# Patient Record
Sex: FEMALE | Race: WHITE | Employment: FULL TIME | URBAN - METROPOLITAN AREA
[De-identification: names, ages, dates, MRNs, and addresses within clinical notes are randomized per-mention and may not be internally consistent; named-entity substitution may affect disease eponyms.]

---

## 2018-07-27 ENCOUNTER — HOSPITAL ENCOUNTER (OUTPATIENT)
Dept: MAMMOGRAPHY | Age: 58
Discharge: HOME | End: 2018-07-27
Attending: INTERNAL MEDICINE
Payer: COMMERCIAL

## 2018-07-27 DIAGNOSIS — Z12.39 SCREENING BREAST EXAMINATION: ICD-10-CM

## 2018-07-27 PROCEDURE — 77067 SCR MAMMO BI INCL CAD: CPT

## 2020-02-27 ENCOUNTER — APPOINTMENT (OUTPATIENT)
Dept: GENERAL RADIOLOGY | Age: 60
End: 2020-02-27
Attending: EMERGENCY MEDICINE
Payer: COMMERCIAL

## 2020-02-27 ENCOUNTER — HOSPITAL ENCOUNTER (EMERGENCY)
Age: 60
Discharge: HOME | End: 2020-02-27
Attending: EMERGENCY MEDICINE
Payer: COMMERCIAL

## 2020-02-27 VITALS
HEART RATE: 70 BPM | DIASTOLIC BLOOD PRESSURE: 62 MMHG | BODY MASS INDEX: 35.93 KG/M2 | TEMPERATURE: 97.9 F | HEIGHT: 60 IN | SYSTOLIC BLOOD PRESSURE: 115 MMHG | RESPIRATION RATE: 17 BRPM | WEIGHT: 183 LBS | OXYGEN SATURATION: 98 %

## 2020-02-27 DIAGNOSIS — R07.9 CHEST PAIN, UNSPECIFIED TYPE: Primary | ICD-10-CM

## 2020-02-27 LAB
ALBUMIN SERPL-MCNC: 4.2 G/DL (ref 3.5–5)
ALBUMIN/GLOB SERPL: 1.4 {RATIO} (ref 1.1–2.2)
ALP SERPL-CCNC: 82 U/L (ref 45–117)
ALT SERPL-CCNC: 27 U/L (ref 12–78)
ANION GAP SERPL CALC-SCNC: 6 MMOL/L (ref 5–15)
AST SERPL-CCNC: 15 U/L (ref 15–37)
BASOPHILS # BLD: 0 K/UL (ref 0–0.1)
BASOPHILS # BLD: 0.1 K/UL (ref 0–0.1)
BASOPHILS NFR BLD: 0 % (ref 0–1)
BASOPHILS NFR BLD: 1 % (ref 0–1)
BILIRUB SERPL-MCNC: 0.4 MG/DL (ref 0.2–1)
BUN SERPL-MCNC: 20 MG/DL (ref 6–20)
BUN/CREAT SERPL: 24 (ref 12–20)
CALCIUM SERPL-MCNC: 9.5 MG/DL (ref 8.5–10.1)
CHLORIDE SERPL-SCNC: 104 MMOL/L (ref 97–108)
CO2 SERPL-SCNC: 29 MMOL/L (ref 21–32)
COMMENT, HOLDF: NORMAL
CREAT SERPL-MCNC: 0.84 MG/DL (ref 0.55–1.02)
D DIMER PPP FEU-MCNC: 0.28 MG/L FEU (ref 0–0.65)
DIFFERENTIAL METHOD BLD: ABNORMAL
DIFFERENTIAL METHOD BLD: ABNORMAL
EOSINOPHIL # BLD: 0.3 K/UL (ref 0–0.4)
EOSINOPHIL # BLD: 0.3 K/UL (ref 0–0.4)
EOSINOPHIL NFR BLD: 3 % (ref 0–7)
EOSINOPHIL NFR BLD: 3 % (ref 0–7)
ERYTHROCYTE [DISTWIDTH] IN BLOOD BY AUTOMATED COUNT: 14.1 % (ref 11.5–14.5)
ERYTHROCYTE [DISTWIDTH] IN BLOOD BY AUTOMATED COUNT: 14.1 % (ref 11.5–14.5)
GLOBULIN SER CALC-MCNC: 3 G/DL (ref 2–4)
GLUCOSE SERPL-MCNC: 103 MG/DL (ref 65–100)
HCT VFR BLD AUTO: 42.9 % (ref 35–47)
HCT VFR BLD AUTO: 43 % (ref 35–47)
HGB BLD-MCNC: 14.4 G/DL (ref 11.5–16)
HGB BLD-MCNC: 14.4 G/DL (ref 11.5–16)
IMM GRANULOCYTES # BLD AUTO: 0 K/UL (ref 0–0.04)
IMM GRANULOCYTES # BLD AUTO: 0 K/UL (ref 0–0.04)
IMM GRANULOCYTES NFR BLD AUTO: 0 % (ref 0–0.5)
IMM GRANULOCYTES NFR BLD AUTO: 0 % (ref 0–0.5)
LIPASE SERPL-CCNC: 99 U/L (ref 73–393)
LYMPHOCYTES # BLD: 3.8 K/UL (ref 0.8–3.5)
LYMPHOCYTES # BLD: 3.8 K/UL (ref 0.8–3.5)
LYMPHOCYTES NFR BLD: 37 % (ref 12–49)
LYMPHOCYTES NFR BLD: 38 % (ref 12–49)
MAGNESIUM SERPL-MCNC: 2.1 MG/DL (ref 1.6–2.4)
MCH RBC QN AUTO: 29.7 PG (ref 26–34)
MCH RBC QN AUTO: 29.7 PG (ref 26–34)
MCHC RBC AUTO-ENTMCNC: 33.5 G/DL (ref 30–36.5)
MCHC RBC AUTO-ENTMCNC: 33.6 G/DL (ref 30–36.5)
MCV RBC AUTO: 88.5 FL (ref 80–99)
MCV RBC AUTO: 88.7 FL (ref 80–99)
MONOCYTES # BLD: 0.7 K/UL (ref 0–1)
MONOCYTES # BLD: 0.7 K/UL (ref 0–1)
MONOCYTES NFR BLD: 7 % (ref 5–13)
MONOCYTES NFR BLD: 7 % (ref 5–13)
NEUTS SEG # BLD: 5.3 K/UL (ref 1.8–8)
NEUTS SEG # BLD: 5.3 K/UL (ref 1.8–8)
NEUTS SEG NFR BLD: 51 % (ref 32–75)
NEUTS SEG NFR BLD: 53 % (ref 32–75)
NRBC # BLD: 0 K/UL (ref 0–0.01)
NRBC # BLD: 0 K/UL (ref 0–0.01)
NRBC BLD-RTO: 0 PER 100 WBC
NRBC BLD-RTO: 0 PER 100 WBC
PLATELET # BLD AUTO: 321 K/UL (ref 150–400)
PLATELET # BLD AUTO: 326 K/UL (ref 150–400)
PMV BLD AUTO: 9.7 FL (ref 8.9–12.9)
PMV BLD AUTO: 9.7 FL (ref 8.9–12.9)
POTASSIUM SERPL-SCNC: 3.3 MMOL/L (ref 3.5–5.1)
PROT SERPL-MCNC: 7.2 G/DL (ref 6.4–8.2)
RBC # BLD AUTO: 4.85 M/UL (ref 3.8–5.2)
RBC # BLD AUTO: 4.85 M/UL (ref 3.8–5.2)
SAMPLES BEING HELD,HOLD: NORMAL
SODIUM SERPL-SCNC: 139 MMOL/L (ref 136–145)
TROPONIN I SERPL-MCNC: <0.05 NG/ML
WBC # BLD AUTO: 10.1 K/UL (ref 3.6–11)
WBC # BLD AUTO: 10.2 K/UL (ref 3.6–11)

## 2020-02-27 PROCEDURE — 84484 ASSAY OF TROPONIN QUANT: CPT

## 2020-02-27 PROCEDURE — 71046 X-RAY EXAM CHEST 2 VIEWS: CPT

## 2020-02-27 PROCEDURE — 74011250637 HC RX REV CODE- 250/637: Performed by: EMERGENCY MEDICINE

## 2020-02-27 PROCEDURE — 85025 COMPLETE CBC W/AUTO DIFF WBC: CPT

## 2020-02-27 PROCEDURE — 83735 ASSAY OF MAGNESIUM: CPT

## 2020-02-27 PROCEDURE — 99285 EMERGENCY DEPT VISIT HI MDM: CPT

## 2020-02-27 PROCEDURE — 83690 ASSAY OF LIPASE: CPT

## 2020-02-27 PROCEDURE — 93005 ELECTROCARDIOGRAM TRACING: CPT

## 2020-02-27 PROCEDURE — 80053 COMPREHEN METABOLIC PANEL: CPT

## 2020-02-27 PROCEDURE — 36415 COLL VENOUS BLD VENIPUNCTURE: CPT

## 2020-02-27 PROCEDURE — 85379 FIBRIN DEGRADATION QUANT: CPT

## 2020-02-27 RX ORDER — POTASSIUM CHLORIDE 750 MG/1
10 TABLET, FILM COATED, EXTENDED RELEASE ORAL
Status: COMPLETED | OUTPATIENT
Start: 2020-02-27 | End: 2020-02-27

## 2020-02-27 RX ADMIN — POTASSIUM CHLORIDE 10 MEQ: 750 TABLET, FILM COATED, EXTENDED RELEASE ORAL at 21:54

## 2020-02-27 NOTE — ED TRIAGE NOTES
Left sided chest \"tightness, heaviness and occasionally sharp pain\" starting 1 week ago that has been constant for last  2 days. Pt reports SOB and nausea. Today pain radiates down left arm.

## 2020-02-28 LAB
ATRIAL RATE: 92 BPM
CALCULATED P AXIS, ECG09: 11 DEGREES
CALCULATED R AXIS, ECG10: -19 DEGREES
CALCULATED T AXIS, ECG11: 13 DEGREES
DIAGNOSIS, 93000: NORMAL
P-R INTERVAL, ECG05: 158 MS
Q-T INTERVAL, ECG07: 356 MS
QRS DURATION, ECG06: 78 MS
QTC CALCULATION (BEZET), ECG08: 440 MS
VENTRICULAR RATE, ECG03: 92 BPM

## 2020-02-28 NOTE — ED NOTES
Dr. Lacie Santillan has reviewed discharge instructions with the patient. The patient verbalized understanding. Written discharge instructions given to pt. Pt ambulated out of the ED in stable condition.

## 2020-02-28 NOTE — ED NOTES
Verbal shift change report given to Valery Condon RN (oncoming nurse) by Josias Estrada RN (offgoing nurse). Report included the following information SBAR and ED Summary.

## 2020-02-28 NOTE — DISCHARGE INSTRUCTIONS

## 2020-02-28 NOTE — ED PROVIDER NOTES
HPI     Pt is a 61 y.o. F with PMH of asthma, HTN, HLD here with chest pain x 1 week and shortness of breath x 2 days and left arm pain that started today. She describes it as a tightness. ASA 81 mg taken today which helped pain slightly improved and is now 3/10. She denies any exacerbating factors. She denies diaphoresis but does have nausea without vomiting occasionally. Pain comes at rest mainly. She does lift at her job but has not noticed pain with work and no injury. No other complaints at this time. She has hx of hiatal hernia and says this feels similar starting in epigastric area and radiating up. Past Medical History:   Diagnosis Date    Asthma     Hyperlipemia     Hypertension        Past Surgical History:   Procedure Laterality Date    ABDOMEN SURGERY PROC UNLISTED           No family history on file.     Social History     Socioeconomic History    Marital status:      Spouse name: Not on file    Number of children: Not on file    Years of education: Not on file    Highest education level: Not on file   Occupational History    Not on file   Social Needs    Financial resource strain: Not on file    Food insecurity:     Worry: Not on file     Inability: Not on file    Transportation needs:     Medical: Not on file     Non-medical: Not on file   Tobacco Use    Smoking status: Never Smoker   Substance and Sexual Activity    Alcohol use: No    Drug use: No    Sexual activity: Not on file   Lifestyle    Physical activity:     Days per week: Not on file     Minutes per session: Not on file    Stress: Not on file   Relationships    Social connections:     Talks on phone: Not on file     Gets together: Not on file     Attends Orthodox service: Not on file     Active member of club or organization: Not on file     Attends meetings of clubs or organizations: Not on file     Relationship status: Not on file    Intimate partner violence:     Fear of current or ex partner: Not on file     Emotionally abused: Not on file     Physically abused: Not on file     Forced sexual activity: Not on file   Other Topics Concern    Not on file   Social History Narrative    Not on file         ALLERGIES: Patient has no known allergies. Review of Systems   Constitutional: Negative for chills, diaphoresis and fever. HENT: Negative for congestion and trouble swallowing. Eyes: Negative for photophobia and visual disturbance. Respiratory: Positive for shortness of breath. Negative for cough and chest tightness. Cardiovascular: Positive for chest pain. Negative for palpitations and leg swelling. Gastrointestinal: Positive for nausea. Negative for abdominal pain, diarrhea and vomiting. Genitourinary: Negative for difficulty urinating, dysuria, flank pain and frequency. Musculoskeletal: Positive for myalgias. Negative for back pain. Skin: Negative for rash and wound. Neurological: Negative for dizziness, weakness, light-headedness and headaches. Hematological: Negative for adenopathy. Does not bruise/bleed easily. Psychiatric/Behavioral: Negative for agitation and confusion. All other systems reviewed and are negative. Vitals:    02/27/20 1859   BP: 144/64   Pulse: 78   Resp: 14   Temp: 97.6 °F (36.4 °C)   SpO2: 98%   Weight: 83 kg (183 lb)   Height: 5' (1.524 m)            Physical Exam  Vitals signs and nursing note reviewed. Constitutional:       General: She is not in acute distress. Appearance: She is well-developed. She is not diaphoretic. HENT:      Head: Normocephalic. Eyes:      Conjunctiva/sclera: Conjunctivae normal.      Pupils: Pupils are equal, round, and reactive to light. Neck:      Musculoskeletal: Normal range of motion and neck supple. Vascular: No JVD. Cardiovascular:      Rate and Rhythm: Normal rate and regular rhythm. Heart sounds: Normal heart sounds.    Pulmonary:      Effort: Pulmonary effort is normal.      Breath sounds: Normal breath sounds. Abdominal:      General: Bowel sounds are normal. There is no distension. Palpations: Abdomen is soft. Tenderness: There is no abdominal tenderness. Musculoskeletal: Normal range of motion. General: No tenderness or deformity. Lymphadenopathy:      Cervical: No cervical adenopathy. Skin:     General: Skin is warm and dry. Capillary Refill: Capillary refill takes less than 2 seconds. Findings: No erythema or rash. Neurological:      Mental Status: She is alert and oriented to person, place, and time. Cranial Nerves: No cranial nerve deficit. Sensory: No sensory deficit. MDM       Procedures    ED EKG interpretation:  Rhythm: normal sinus rhythm; and regular . Rate (approx.): 92; Axis: left axis deviation; P wave: normal; QRS interval: normal ; ST/T wave: Q waves. No ST elevation or depression or T wave abnormality;  EKG documented by Quentin Moise MD, as interpreted by Darryl Valencia MD, ED MD.    Pt with pain for few days with no elevation of troponin. Pt with 3 risk factors, HTN, HLD, and family hx. Still with heart score less than 4 thus advised to follow up with cardiology outpatient for stress and further eval.  Also advised to follow up with GI 2/2 to hx of hiatal hernia and similar pain from that. Potassium replaced. Patient's results have been reviewed with them. Patient and/or family have verbally conveyed their understanding and agreement of the patient's signs, symptoms, diagnosis, treatment and prognosis and additionally agree to follow up as recommended or return to the Emergency Room should their condition change prior to follow-up. Discharge instructions have also been provided to the patient with some educational information regarding their diagnosis as well a list of reasons why they would want to return to the ER prior to their follow-up appointment should their condition change.     Quentin Moise MD

## 2020-02-28 NOTE — ED NOTES
Verbal shift change report given to Joann Cortez (oncoming nurse) by Geni Beal (offgoing nurse). Report included the following information SBAR, ED Summary and MAR.

## 2021-03-19 ENCOUNTER — TRANSCRIBE ORDER (OUTPATIENT)
Dept: SCHEDULING | Age: 61
End: 2021-03-19

## 2021-03-19 DIAGNOSIS — Z78.0 POSTMENOPAUSAL: Primary | ICD-10-CM

## 2021-03-19 DIAGNOSIS — Z12.31 VISIT FOR SCREENING MAMMOGRAM: Primary | ICD-10-CM

## 2021-04-23 ENCOUNTER — HOSPITAL ENCOUNTER (OUTPATIENT)
Dept: MAMMOGRAPHY | Age: 61
Discharge: HOME | End: 2021-04-23
Attending: INTERNAL MEDICINE
Payer: COMMERCIAL

## 2021-04-23 DIAGNOSIS — Z78.0 POSTMENOPAUSAL: ICD-10-CM

## 2021-04-23 DIAGNOSIS — Z12.31 VISIT FOR SCREENING MAMMOGRAM: ICD-10-CM

## 2021-04-23 PROCEDURE — 77067 SCR MAMMO BI INCL CAD: CPT

## 2021-04-23 PROCEDURE — 77080 DXA BONE DENSITY AXIAL: CPT

## 2021-04-30 ENCOUNTER — TRANSCRIBE ORDER (OUTPATIENT)
Dept: SCHEDULING | Age: 61
End: 2021-04-30

## 2021-04-30 DIAGNOSIS — R92.8 ABNORMAL MAMMOGRAM: Primary | ICD-10-CM

## 2021-05-20 ENCOUNTER — TRANSCRIBE ORDER (OUTPATIENT)
Dept: MAMMOGRAPHY | Age: 61
End: 2021-05-20

## 2021-05-20 ENCOUNTER — HOSPITAL ENCOUNTER (OUTPATIENT)
Dept: MAMMOGRAPHY | Age: 61
Discharge: HOME | End: 2021-05-20
Attending: INTERNAL MEDICINE
Payer: COMMERCIAL

## 2021-05-20 DIAGNOSIS — R92.8 ABNORMALITY OF RIGHT BREAST ON SCREENING MAMMOGRAM: Primary | ICD-10-CM

## 2021-05-20 DIAGNOSIS — R92.8 ABNORMALITY OF RIGHT BREAST ON SCREENING MAMMOGRAM: ICD-10-CM

## 2021-05-20 DIAGNOSIS — R92.8 ABNORMAL MAMMOGRAM: ICD-10-CM

## 2021-05-20 PROCEDURE — 77065 DX MAMMO INCL CAD UNI: CPT

## 2021-05-20 PROCEDURE — 76642 ULTRASOUND BREAST LIMITED: CPT

## 2021-05-24 ENCOUNTER — DOCUMENTATION ONLY (OUTPATIENT)
Dept: SURGERY | Age: 61
End: 2021-05-24

## 2021-05-24 ENCOUNTER — HOSPITAL ENCOUNTER (OUTPATIENT)
Dept: LAB | Age: 61
Discharge: HOME | End: 2021-05-24

## 2021-05-24 ENCOUNTER — OFFICE VISIT (OUTPATIENT)
Dept: SURGERY | Age: 61
End: 2021-05-24
Payer: COMMERCIAL

## 2021-05-24 VITALS
WEIGHT: 183 LBS | DIASTOLIC BLOOD PRESSURE: 60 MMHG | BODY MASS INDEX: 35.93 KG/M2 | HEART RATE: 77 BPM | SYSTOLIC BLOOD PRESSURE: 103 MMHG | HEIGHT: 60 IN

## 2021-05-24 DIAGNOSIS — R92.8 ABNORMAL ULTRASOUND OF BREAST: ICD-10-CM

## 2021-05-24 DIAGNOSIS — N63.10 BREAST MASS, RIGHT: Primary | ICD-10-CM

## 2021-05-24 PROCEDURE — 19083 BX BREAST 1ST LESION US IMAG: CPT | Performed by: SURGERY

## 2021-05-24 PROCEDURE — 99243 OFF/OP CNSLTJ NEW/EST LOW 30: CPT | Performed by: SURGERY

## 2021-05-24 RX ORDER — TRAMADOL HYDROCHLORIDE 50 MG/1
50 TABLET ORAL
COMMUNITY

## 2021-05-24 RX ORDER — CELECOXIB 200 MG/1
CAPSULE ORAL
COMMUNITY
Start: 2021-04-26

## 2021-05-24 RX ORDER — ROSUVASTATIN CALCIUM 20 MG/1
20 TABLET, COATED ORAL
COMMUNITY
End: 2021-06-25

## 2021-05-24 RX ORDER — LISINOPRIL AND HYDROCHLOROTHIAZIDE 20; 25 MG/1; MG/1
1 TABLET ORAL DAILY
COMMUNITY

## 2021-05-24 NOTE — PATIENT INSTRUCTIONS
Ultrasound-Guided Breast Biopsy: About This Test  What is it? A breast biopsy removes a sample of breast tissue that is looked at under a microscope to check for breast cancer. Ultrasound is used to show an image of the breast tissue during the biopsy. This is called an ultrasound-guided breast biopsy. Why is this test done? A breast biopsy is most often done to check a lump found during a breast exam or a suspicious area found on a mammogram or other imaging. If there is a good chance that your doctor can get a sample without doing an open (surgical) biopsy, you can have a needle biopsy instead. For a needle breast biopsy, your doctor uses a needle to take a small sample of fluid or cells from the breast for testing. When the biopsy area isn't easy to find, the breast biopsy needle is usually guided with ultrasound. An ultrasound uses sound waves to make a picture of the inside of the breast. The sound waves create a picture on a video monitor. How do you prepare for the test?  If you take aspirin or some other blood thinner, ask your doctor if you should stop taking it before your test. Make sure that you understand exactly what your doctor wants you to do. These medicines increase the risk of bleeding. How is the test done? Ultrasound is used to guide the placement of the needle during the biopsy. · A warm gel will be spread on your breast.  · The ultrasound wand is pressed against your skin and gently moved around to find the place where the mammogram showed a problem. A picture of the breast can be seen on a video monitor. · A needle or tiny probe is put through your skin into your breast tissue. · If the lump is a cyst, the needle will take out fluid. If the lump is solid, the needle will take a sample of tissue. · The needle is removed and pressure is put on the needle site to stop any bleeding. The area is covered with a bandage. How does the test feel?   · Ultrasound is painless and does not use radiation. · You will feel only a quick sting from the needle if you have a local anesthetic to numb the biopsy area. You may feel some pressure when the biopsy needle is put in. How long does the test take? The test usually takes about 15 minutes. This depends on how many biopsy samples are needed. What happens after the test?  · You'll be told how long it may take to get your results back. · You will probably be able to go home right away. · After a specialist looks at the biopsy sample for signs of cancer, your doctor's office will let you know the results. · If the test results aren't clear, you may have another biopsy or test.  How can you care for yourself at home? · You can go back to your usual activities right away. But avoid heavy lifting for 24 hours. · The site may be tender for 2 or 3 days. You may also have some bruising, swelling, or slight bleeding. ? You can use an ice pack. Put ice or a cold pack on the area for 10 to 20 minutes at a time. Put a thin cloth between the ice and your skin. ? Ask your doctor if you can take an over-the-counter pain medicine, such as acetaminophen (Tylenol), ibuprofen (Advil, Motrin), or naproxen (Aleve). Be safe with medicines. Read and follow all instructions on the label. Follow-up care is a key part of your treatment and safety. Be sure to make and go to all appointments, and call your doctor if you are having problems. It's also a good idea to keep a list of the medicines you take. Ask your doctor when you can expect to have your test results. Where can you learn more? Go to http://www.gray.com/  Enter N959 in the search box to learn more about \"Ultrasound-Guided Breast Biopsy: About This Test.\"  Current as of: December 17, 2020               Content Version: 12.8  © 0021-7279 [employer], [employer].    Care instructions adapted under license by [employer] (which disclaims liability or warranty for this information). If you have questions about a medical condition or this instruction, always ask your healthcare professional. [place] [address] any warranty or liability for your use of this information.

## 2021-05-24 NOTE — LETTER
5/24/2021 9:21 AM 
 
Patient:  Sandee Yo YOB: 1960 Date of Visit: 5/24/2021 Dear Dr. Neeru Scott: Thank you for referring Ms. Sandee Yo to me for evaluation/treatment. Below are the relevant portions of my assessment and plan of care. If you have questions, please do not hesitate to call me. I look forward to following Ms. Norman Corrales along with you.  
 
 
 
Sincerely, 
 
 
Ange Chavarria MD

## 2021-05-24 NOTE — PROGRESS NOTES
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 HPI NEW Patient presents for consultation at the request of Dr. Kierra Orozco for RIGHT breast biopsy. She has no abnormal breast symptoms to report. This area was detected on mammogram and breast US. No history or prior breast biopsies. Family history- Father had lung cancer Breast imaging- 
[facility] Results (most recent): 
Results from Hospital Encounter encounter on 05/20/21 [facility] MAMMO RT DX INCL CAD Narrative JORDAN MAMMO RT DX INCL CAD, US BREAST RT LIMITED=<3 QUAD INDICATION: . Other abnormal and inconclusive findings on diagnostic imaging of 
breast. 
COMPARISON: [id number] Leeroy Miranda TECHNIQUE: 
Whole breast ML and spot compression views of the right breast were performed on 
a digital system with computer-aided detection. Targeted ultrasound of the right 
breast was also performed. . 
COMPOSITION: Mammographically, the breast tissue is with scattered 
fibroglandular densities. Leeroy Miranda FINDINGS: 
There is a focal asymmetry in the lateral aspect of the right breast, middle 
3rd. . 
Targeted right breast ultrasound: There is an irregular, hypoechoic lesion in 
the 9-10 o'clock breast, 4 cm from the nipple which measures 0.7 x 0.4 x 0.6 cm. There is no axillary adenopathy visible. . 
 
Impression 1. Irregular, masslike lesion in the right breast. There is no visible axillary 
adenopathy. BI-RADS Category 4C - Suspicious abnormality. . 
RECOMMENDATION: 
Ultrasound-guided core needle biopsy. . 
The findings of this exam and the recommendation were discussed with the patient 
at the time of exam by myself. Review of Systems Constitutional: Negative. HENT: Positive for tinnitus. Eyes: Negative. Respiratory: Negative. Cardiovascular: Positive for leg swelling. Gastrointestinal: Negative. Genitourinary: Negative. Musculoskeletal: Negative. Skin: Negative. Neurological: Negative. Endo/Heme/Allergies: Negative. Psychiatric/Behavioral: Negative. Physical Exam 
 
ASSESSMENT and PLAN 
{ASSESSMENT/PLAN:[id number]}

## 2021-05-24 NOTE — PROGRESS NOTES
[facility]  OFFICE PROCEDURE PROGRESS NOTE        Chart reviewed for the following:   Yohan Malave MD, have reviewed the History, Physical and updated the Allergic reactions for Guadalupe Pascal performed immediately prior to start of procedure:   Yohan Malave MD, have performed the following reviews on Mike Gaviria prior to the start of the procedure:            * Patient was identified by name and date of birth   * Agreement on procedure being performed was verified  * Risks and Benefits explained to the patient  * Procedure site verified and marked as necessary  * Patient was positioned for comfort  * Consent was signed and verified     Time: 8:50 AM      Date of procedure: 5/24/2021    Procedure performed by:  Robyn Acevedo MD    Provider assisted by: Nikia Goodrich RN    Patient assisted by: self    How tolerated by patient: tolerated the procedure well with no complications    Post Procedural Pain Scale: 0 - No Hurt    Comments: Patient tolerated the procedure well without complications. Denies pain post biopsy.

## 2021-05-24 NOTE — PROGRESS NOTES
HISTORY OF PRESENT ILLNESS  Steve Camargo is a 61 y.o. female. HPI  NEW Patient presents for consultation at the request of Dr. Rachel Hill for RIGHT breast biopsy. She has no abnormal breast symptoms to report. This area was detected on mammogram and breast US. No history or prior breast biopsies.     Family history-  Father had lung cancer     Breast imaging-  [facility] Results (most recent):  Results from Hospital Encounter encounter on 05/20/21     [facility] MAMMO RT DX INCL CAD     Narrative  [facility] MAMMO RT DX INCL CAD, US BREAST RT LIMITED=<3 QUAD  INDICATION: . Other abnormal and inconclusive findings on diagnostic imaging of  breast.  COMPARISON: [id number]  . TECHNIQUE:  Whole breast ML and spot compression views of the right breast were performed on  a digital system with computer-aided detection. Targeted ultrasound of the right  breast was also performed. .  COMPOSITION: Mammographically, the breast tissue is with scattered  fibroglandular densities. Gianna Blackwell FINDINGS:  There is a focal asymmetry in the lateral aspect of the right breast, middle  3rd. .  Targeted right breast ultrasound: There is an irregular, hypoechoic lesion in  the 9-10 o'clock breast, 4 cm from the nipple which measures 0.7 x 0.4 x 0.6 cm. There is no axillary adenopathy visible. .     Impression  1. Irregular, masslike lesion in the right breast. There is no visible axillary  adenopathy. BI-RADS Category 4C - Suspicious abnormality. .  RECOMMENDATION:  Ultrasound-guided core needle biopsy.   .  The findings of this exam and the recommendation were discussed with the patient  at the time of exam by myself.       Past Medical History:   Diagnosis Date    Asthma     Hyperlipemia     Hypertension        Past Surgical History:   Procedure Laterality Date    PA ABDOMEN SURGERY PROC UNLISTED         Social History     Socioeconomic History    Marital status:      Spouse name: Not on file    Number of children: Not on file    Years of education: Not on file    Highest education level: Not on file   Occupational History    Not on file   Tobacco Use    Smoking status: Never Smoker    Smokeless tobacco: Never Used   Substance and Sexual Activity    Alcohol use: No    Drug use: No    Sexual activity: Not on file   Other Topics Concern    Not on file   Social History Narrative    Not on file     Social Determinants of Health     Financial Resource Strain:     Difficulty of Paying Living Expenses:    Food Insecurity:     Worried About Running Out of Food in the Last Year:     [address] in the Last Year:    Transportation Needs:     Lack of Transportation (Medical):  Lack of Transportation (Non-Medical):    Physical Activity:     Days of Exercise per Week:     Minutes of Exercise per Session:    Stress:     Feeling of Stress :    Social Connections:     Frequency of Communication with Friends and Family:     Frequency of Social Gatherings with Friends and Family:     Attends [employer] Services:     Active Member of Clubs or Organizations:     Attends Club or Organization Meetings:     Marital Status:    Intimate Partner Violence:     Fear of Current or Ex-Partner:     Emotionally Abused:     Physically Abused:     Sexually Abused:        Current Outpatient Medications on File Prior to Visit   Medication Sig Dispense Refill    LISINOPRIL PO Take  by mouth.  SIMVASTATIN PO Take  by mouth.  celecoxib (CELEBREX) 200 mg capsule TAKE 1 CAPSULE BY MOUTH ONCE DAILY WITH FOOD FOR 30 DAYS      cyanocobalamin (VITAMIN B12) 1,000 mcg/mL injection 1,000 mcg by IntraMUSCular route once.  Indications: every 2 weeks (Patient not taking: Reported on 5/24/2021)      predniSONE (STERAPRED DS) 10 mg dose pack Take 2 tab 3 times a day for 3 days, then take 1 tab 3 times a day for 3 days, then take 1 tab 2 times a day for 3 days, then take 1 tab once a day for 3 days  Disp: 36 (Patient not taking: Reported on 5/24/2021) 36 Tab 0    HYDROcodone-acetaminophen (NORCO) 5-325 mg per tablet Take 1 Tab by mouth every four (4) hours as needed for Pain. Max Daily Amount: 6 Tabs. (Patient not taking: Reported on 5/24/2021) 12 Tab 0     No current facility-administered medications on file prior to visit. No Known Allergies    OB History    No obstetric history on file. Obstetric Comments   Menarche:  5. LMP: 2014. # of Children:  4. Age at Delivery of First Child:  13.   Hysterectomy/oophorectomy:  NO/?.  Breast Bx:  no.  Hx of Breast Feeding:  ? Jonathon Quinonesk BCP:  ? . Hormone therapy:  no.                  ROS  Constitutional: Negative. HENT: Positive for tinnitus. Eyes: Negative. Respiratory: Negative. Cardiovascular: Positive for leg swelling. Gastrointestinal: Negative. Genitourinary: Negative. Musculoskeletal: Negative. Skin: Negative. Neurological: Negative. Endo/Heme/Allergies: Negative. Psychiatric/Behavioral: Negative. Physical Exam  Exam conducted with a chaperone present. Cardiovascular:      Rate and Rhythm: Normal rate and regular rhythm. Heart sounds: Normal heart sounds. Pulmonary:      Breath sounds: Normal breath sounds. Chest:      Breasts: Breasts are symmetrical.         Right: Normal. No swelling, bleeding, inverted nipple, mass, nipple discharge, skin change or tenderness. Left: Normal. No swelling, bleeding, inverted nipple, mass, nipple discharge, skin change or tenderness. Lymphadenopathy:      Cervical:      Right cervical: No superficial, deep or posterior cervical adenopathy. Left cervical: No superficial, deep or posterior cervical adenopathy. Upper Body:      Right upper body: No supraclavicular or axillary adenopathy. Left upper body: No supraclavicular or axillary adenopathy. BREAST ULTRASOUND  Indication: RIGHT breast abnormal mammogram  Technique:  The area was scanned using a high-frequency linear-array near-field transducer  Findings: 7mm irregular mass at 9:00  Impression: Suspicious mass  Disposition: Ultrasound-guided biopsy performed      US-GUIDED CORE BIOPSY  Following detailed explanation and description of the biopsy procedure, its risk, benefits and possible alternatives, the patient signed the informed consent. Indication: Mass, Ultrasound Visible, RIGHT breast 9:00   Prep: We cleansed the skin with alcohol. Anesthesia: We anesthetized the skin and underlying tissues with 1% lidocaine with epinephrine. Device: We advanced the Mingly Marquee device through the lesion and captured tissue with real-time ultrasound confirmation. Core Sampling: We repeated this sampling for the following number of cores, 3. Marker: We placed a marking clip to kandice the biopsy site. Marker Type: HydroMARK. Dressing: We then closed the incision with steristrips and placed a sterile dressing. Instructions: The patient was instructed regarding post-procedure care and activities. Pathology: Pending at this time. Patient tolerated procedure well and discharged in stable condition. Informed patient that they will be notified of pathology results in 3 to 5 days. ASSESSMENT and PLAN    ICD-10-CM ICD-9-CM    1. Breast mass, right  N63.10 611.72    2. Abnormal ultrasound of breast  R92.8 793.89       New patient presents for bx of RIGHT breast mass, and is doing well overall. No palpable mass on exam. RIGHT breast US visualizes 7mm irregular mass at 9:00. Discussed bx of this mass and pt agreed to proceed. She tolerated the procedure well, and 3 cores were taken, [facility] marker placed. Pt may return to work today, but should avoid heavy lifting. Brief discussion of treatment options in case of malignancy. Will send bx specimens for PATH and f/u with results. This plan was reviewed with the patient and patient agrees. All questions were answered. Total time spent was 40 minutes.      Written by Pj Posey Pineda Abel, as dictated by Dr. Francesco Frankel, MD.

## 2021-05-28 ENCOUNTER — TELEPHONE (OUTPATIENT)
Dept: SURGERY | Age: 61
End: 2021-05-28

## 2021-06-08 ENCOUNTER — TELEPHONE (OUTPATIENT)
Dept: SURGERY | Age: 61
End: 2021-06-08

## 2021-06-08 ENCOUNTER — HOSPITAL ENCOUNTER (OUTPATIENT)
Dept: MRI IMAGING | Age: 61
Discharge: HOME | End: 2021-06-08
Attending: SURGERY
Payer: COMMERCIAL

## 2021-06-08 VITALS — WEIGHT: 183 LBS | BODY MASS INDEX: 35.74 KG/M2

## 2021-06-08 DIAGNOSIS — C50.911 BREAST CANCER, STAGE 1, RIGHT (HCC): ICD-10-CM

## 2021-06-08 DIAGNOSIS — R92.8 ABNORMAL FINDING ON BREAST IMAGING: Primary | ICD-10-CM

## 2021-06-08 PROCEDURE — 77049 MRI BREAST C-+ W/CAD BI: CPT

## 2021-06-08 PROCEDURE — 74011250636 HC RX REV CODE- 250/636: Performed by: RADIOLOGY

## 2021-06-08 PROCEDURE — A9585 GADOBUTROL INJECTION: HCPCS | Performed by: RADIOLOGY

## 2021-06-08 PROCEDURE — 77030021566

## 2021-06-08 RX ADMIN — GADOBUTROL 8 ML: 604.72 INJECTION INTRAVENOUS at 12:56

## 2021-06-08 NOTE — TELEPHONE ENCOUNTER
Called and spoke with patient. Reviewed breast MRI. Known cancer on the RIGHT. Two areas for biopsy on the LEFT. Patient amenable to biopsy. LEFT MRI biopsy x 2. Will order. Patient scheduled for breast talk on 6/16/21 - will move if biopsy results unlikely to be available by then.

## 2021-06-09 DIAGNOSIS — C50.911 BREAST CANCER, STAGE 1, RIGHT (HCC): Primary | ICD-10-CM

## 2021-06-09 RX ORDER — ALPRAZOLAM 0.25 MG/1
0.25 TABLET ORAL AS NEEDED
Qty: 5 TABLET | Refills: 0 | Status: SHIPPED | OUTPATIENT
Start: 2021-06-09 | End: 2021-06-25

## 2021-06-11 ENCOUNTER — NURSE NAVIGATOR (OUTPATIENT)
Dept: CASE MANAGEMENT | Age: 61
End: 2021-06-11

## 2021-06-11 NOTE — PROGRESS NOTES
[address]  Breast Navigator Intake Assessment    Name:  Briana Sandy      MRN:  [id number]  Age:  61 y.o. Sex:  female  YOB: 1960  Referring Provider:   Dr. 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-    Understanding of Diagnosis:   Did not discuss    Personal/Family History of Cancer:  []No [x]Yes; type: Father/lung     Relationship Status:  []Single [x] []Significant Other/Life Partner [] [] []    Support System:    [x]Identified Support Person(s):    Living Circumstances:  []Alone  []w/ Significant Other []Children []Parents []Caregivers  []Assisted Living Facility/Group Home  []Nursing Facility []Homeless []Environmental/Care Concerns:     Employment Status:  [x]Full-time []Part-time  []Retired []Short-Term Disability []Long-Term Disability []Unemployed   Has worked at [employer] for 28 years. Does have plenty of sick leave/short term and long term disability, but does not like being out of work. Learning:    Barriers: []Mental Status  []Hearing Impairment  []Unable to Read/Write  []Challenges Understanding Medical Jargon [x]No Barriers Identified    Preferences: []Reading []Listening []Videos/Pictures  []Other:      Coping with Illness:   [x]Coping Well  []Challenges Coping with Serious Illness []Situational Depression []Situational Anxiety []Anticipatory Grief  []Recent Loss       Interdisciplinary Team:  Med-Onc:    Surg-Onc:             Dr. Mojica Finely:    Plastics:    :     Nurse Navigator:  Josh Koo, RN, BSN, CBCN      Narrative:     Called patient to introduce myself. She was seen by Dr. Radha Soliz for a breast biopsy in late May which turned out to be IDC. She was scheduled and went for her MRI which had two areas of enhancement on the LEFT, for which MRI biopsies were recommended.   She is scheduled for these on 6/16. She had an appt for a breast talk with Dr. Ken Morris, but I suggested that this be moved to the following week so that the biopsy results would be back. She is in agreement with this, so her appt was moved to 6/23/2021 at 2:45 pm at the [facility] location. I answered several questions having to do with time off for surgery (lumpectomy). She does heavy lifting at her job at [employer]. She is willing to go to either hospital, so is hoping surgery can be performed within a reasonable period of time after her breast talk. I suggested that she wait until she talks to Dr. Ken Morris to know what surgery he is suggesting. She felt like I answered all the questions that she had at this time, however she now has my contact information, and I told her to feel free to reach out to me with any other questions, particularly prior to her appt with Dr. Ken Morris in two weeks. Provided the patient with my contact information and discussed my role in her care. Will continue to follow patient throughout  the care continuum.           Referrals/Handouts:      Heath Anthony RN  June 11, 2021          Jesse Thomas RN, BSN, [facility]  Oncology Breast Navigator     [address]  W: [phone]  F: [phone]  [email or web address]  Good Help to Those in [facility]

## 2021-06-16 ENCOUNTER — HOSPITAL ENCOUNTER (OUTPATIENT)
Dept: MRI IMAGING | Age: 61
Discharge: HOME | End: 2021-06-16
Attending: NURSE PRACTITIONER
Payer: COMMERCIAL

## 2021-06-16 ENCOUNTER — HOSPITAL ENCOUNTER (OUTPATIENT)
Dept: MAMMOGRAPHY | Age: 61
Discharge: HOME | End: 2021-06-16
Attending: RADIOLOGY
Payer: COMMERCIAL

## 2021-06-16 VITALS — BODY MASS INDEX: 35.74 KG/M2 | WEIGHT: 183 LBS

## 2021-06-16 DIAGNOSIS — R92.8 ABNORMAL FINDING ON BREAST IMAGING: ICD-10-CM

## 2021-06-16 PROCEDURE — 74011250636 HC RX REV CODE- 250/636: Performed by: NURSE PRACTITIONER

## 2021-06-16 PROCEDURE — 19086 BX BREAST ADD LESION MR IMAG: CPT

## 2021-06-16 PROCEDURE — 74011000258 HC RX REV CODE- 258: Performed by: NURSE PRACTITIONER

## 2021-06-16 PROCEDURE — 77030013726 MRI BX BREAST VAC LT 1ST LESION W/CLIP AND SPECIMEN

## 2021-06-16 PROCEDURE — A9585 GADOBUTROL INJECTION: HCPCS | Performed by: NURSE PRACTITIONER

## 2021-06-16 PROCEDURE — 77065 DX MAMMO INCL CAD UNI: CPT

## 2021-06-16 PROCEDURE — 74011000250 HC RX REV CODE- 250: Performed by: NURSE PRACTITIONER

## 2021-06-16 RX ORDER — SODIUM CHLORIDE 0.9 % (FLUSH) 0.9 %
10 SYRINGE (ML) INJECTION
Status: COMPLETED | OUTPATIENT
Start: 2021-06-16 | End: 2021-06-16

## 2021-06-16 RX ORDER — SODIUM BICARBONATE 84 MG/ML
INJECTION, SOLUTION INTRAVENOUS
Status: DISPENSED
Start: 2021-06-16 | End: 2021-06-16

## 2021-06-16 RX ORDER — LIDOCAINE HYDROCHLORIDE 10 MG/ML
INJECTION, SOLUTION EPIDURAL; INFILTRATION; INTRACAUDAL; PERINEURAL
Status: DISPENSED
Start: 2021-06-16 | End: 2021-06-16

## 2021-06-16 RX ADMIN — GADOBUTROL 7.5 ML: 604.72 INJECTION INTRAVENOUS at 08:42

## 2021-06-16 RX ADMIN — SODIUM CHLORIDE 100 ML: 900 INJECTION, SOLUTION INTRAVENOUS at 08:43

## 2021-06-16 RX ADMIN — SODIUM BICARBONATE 12 MEQ: 84 INJECTION, SOLUTION INTRAVENOUS at 08:10

## 2021-06-16 RX ADMIN — Medication 10 ML: at 08:42

## 2021-06-16 RX ADMIN — LIDOCAINE HYDROCHLORIDE AND EPINEPHRINE 320 MG: 10; 10 INJECTION, SOLUTION INFILTRATION; PERINEURAL at 08:10

## 2021-06-16 RX ADMIN — LIDOCAINE HYDROCHLORIDE 24 ML: 10 INJECTION, SOLUTION EPIDURAL; INFILTRATION; INTRACAUDAL; PERINEURAL at 08:10

## 2021-06-17 RX ORDER — LIDOCAINE HYDROCHLORIDE AND EPINEPHRINE 10; 10 MG/ML; UG/ML
32 INJECTION, SOLUTION INFILTRATION; PERINEURAL ONCE
Status: COMPLETED | OUTPATIENT
Start: 2021-06-17 | End: 2021-06-16

## 2021-06-17 RX ORDER — LIDOCAINE HYDROCHLORIDE 10 MG/ML
24 INJECTION, SOLUTION EPIDURAL; INFILTRATION; INTRACAUDAL; PERINEURAL ONCE
Status: COMPLETED | OUTPATIENT
Start: 2021-06-17 | End: 2021-06-16

## 2021-06-17 RX ORDER — SODIUM BICARBONATE 84 MG/ML
12 INJECTION, SOLUTION INTRAVENOUS
Status: COMPLETED | OUTPATIENT
Start: 2021-06-17 | End: 2021-06-16

## 2021-06-21 ENCOUNTER — TELEPHONE (OUTPATIENT)
Dept: SURGERY | Age: 61
End: 2021-06-21

## 2021-06-23 ENCOUNTER — OFFICE VISIT (OUTPATIENT)
Dept: SURGERY | Age: 61
End: 2021-06-23
Payer: COMMERCIAL

## 2021-06-23 DIAGNOSIS — C50.912 BILATERAL MALIGNANT NEOPLASM OF BREAST IN FEMALE, ESTROGEN RECEPTOR POSITIVE, UNSPECIFIED SITE OF BREAST (HCC): ICD-10-CM

## 2021-06-23 DIAGNOSIS — Z17.0 BILATERAL MALIGNANT NEOPLASM OF BREAST IN FEMALE, ESTROGEN RECEPTOR POSITIVE, UNSPECIFIED SITE OF BREAST (HCC): ICD-10-CM

## 2021-06-23 DIAGNOSIS — C50.911 BILATERAL MALIGNANT NEOPLASM OF BREAST IN FEMALE, ESTROGEN RECEPTOR POSITIVE, UNSPECIFIED SITE OF BREAST (HCC): ICD-10-CM

## 2021-06-23 DIAGNOSIS — C50.912 MALIGNANT NEOPLASM OF LEFT FEMALE BREAST, UNSPECIFIED ESTROGEN RECEPTOR STATUS, UNSPECIFIED SITE OF BREAST (HCC): ICD-10-CM

## 2021-06-23 DIAGNOSIS — C50.911 MALIGNANT NEOPLASM OF RIGHT FEMALE BREAST, UNSPECIFIED ESTROGEN RECEPTOR STATUS, UNSPECIFIED SITE OF BREAST (HCC): Primary | ICD-10-CM

## 2021-06-23 PROCEDURE — 76642 ULTRASOUND BREAST LIMITED: CPT | Performed by: SURGERY

## 2021-06-23 PROCEDURE — 99215 OFFICE O/P EST HI 40 MIN: CPT | Performed by: SURGERY

## 2021-06-23 NOTE — PROGRESS NOTES
HISTORY OF PRESENT ILLNESS  Vicenta Akhtar is a 61 y.o. female. HPI  ESTABLISHED patient here to discuss next steps in breast cancer treatment. She now has bilateral breast cancer. Her LEFT breast is tender s/p MRI biopsies x 2.      05/24/21: RIGHT breast bx, 9:00. PATH: IDC, 0.7cm, histologic grade 1-2, ER+(100%)/[place]+(95%)/HER2-, Ki-67 <5%. Clinical stage 1.    06/16/21: LEFT breast MRI-guided biopsies. PATH:  - Site A, posterior: Invasive and in situ mammary carcinoma, favor lobular, 6mm, overall grade 1, ER+(99%)/[place]+(60%)/HER2-, Ki-67 19%. - Site B, anterior: Invasive and in situ mammary carcinoma, favor lobular, 11mm, overall grade 1, ER+(99%)/[place]-, HER2 equivocal, FISH pending, Ki-67 18%. MammaPrint pending.  - Clinical stage 1. MammaPrint and FISH pending for Site B.     Breast imaging-  MRI Results (most recent):  Results from Hospital Encounter encounter on 06/16/21     MRI BX BREAST VAC LT ADDL W/CLIP AND SPECIMEN     Narrative  INDICATION: Newly diagnosed right breast cancer. 2 suspicious areas of  enhancement in the left breast on breast MRI.     COMPARISON: June 8, 2021 breast MRI     TECHNIQUE:  The risks and benefits of the procedure were discussed with the patient. Written  consent was obtained. Preliminary T1-weighted sagittal MR imaging of the left  breast was performed before and after the intravenous administration of 7.5 mL  Gadavist.     Site A/posterior: The mass in the left breast at the 2:00 posterior position was  targeted. The breast was prepped with ChloraPrep. 1% lidocaine was injected  locally in the skin. Lidocaine with epinephrine was injected into the deep  tissues of the breast. A 9-gauge Suros introducer needle was then advanced into  the breast using an introducer grid. MR imaging of the breast was then repeated. The images demonstrated accurate positioning of the introducer. A 9-gauge Suros  vacuum assisted biopsy needle was then inserted through the introducer.  6 core  samples were obtained. Post procedure MR imaging demonstrated accurate sampling  of the targeted lesion. A biopsy clip was deployed at the biopsy site. The  introducer was then removed.     Site B/anterior: The focal nonmass-like enhancement in the left breast at the  1-2 o'clock position, middle coronal third was targeted. The breast was prepped  with ChloraPrep. 1% lidocaine was injected locally in the skin. Lidocaine with  epinephrine was injected into the deep tissues of the breast. A 9-gauge Suros  introducer needle was then advanced into the breast using an introducer grid. MR  imaging of the breast was then repeated. The images demonstrated accurate  positioning of the introducer. A 9-gauge Suros vacuum assisted biopsy needle was  then inserted through the introducer. 6 core samples were obtained. Post  procedure MR imaging demonstrated accurate sampling of the targeted lesion. A  biopsy clip was deployed at the biopsy site. The introducer was then removed.     The patient tolerated the procedure well. There were no immediate complications. Pressure was applied locally on the breast until hemostasis was achieved. The  breast was dressed appropriately.     Postprocedure left unilateral digital mammography demonstrates accurate  positioning of both clips and no postbiopsy complication.     Impression  Successful MR guided biopsy of 2 left breast lesions. Biopsy clips  are appropriately positioned. Pathology results are pending.       Past Medical History:   Diagnosis Date    Asthma     Hyperlipemia     Hypertension        Past Surgical History:   Procedure Laterality Date    MT ABDOMEN SURGERY PROC UNLISTED         Social History     Socioeconomic History    Marital status:      Spouse name: Not on file    Number of children: Not on file    Years of education: Not on file    Highest education level: Not on file   Occupational History    Not on file   Tobacco Use    Smoking status: Never Smoker    Smokeless tobacco: Never Used   Substance and Sexual Activity    Alcohol use: No    Drug use: No    Sexual activity: Not on file   Other Topics Concern    Not on file   Social History Narrative    Not on file     Social Determinants of Health     Financial Resource Strain:     Difficulty of Paying Living Expenses:    Food Insecurity:     Worried About Running Out of Food in the Last Year:     [address] in the Last Year:    Transportation Needs:     Lack of Transportation (Medical):  Lack of Transportation (Non-Medical):    Physical Activity:     Days of Exercise per Week:     Minutes of Exercise per Session:    Stress:     Feeling of Stress :    Social Connections:     Frequency of Communication with Friends and Family:     Frequency of Social Gatherings with Friends and Family:     Attends Sabianism Services:     Active Member of Clubs or Organizations:     Attends Club or Organization Meetings:     Marital Status:    Intimate Partner Violence:     Fear of Current or Ex-Partner:     Emotionally Abused:     Physically Abused:     Sexually Abused:        Current Outpatient Medications on File Prior to Visit   Medication Sig Dispense Refill    ALPRAZolam (XANAX) 0.25 mg tablet Take 1 Tablet by mouth as needed for Anxiety (1-2 tabs PO 1 hour prior to the procedure). Max Daily Amount: 2 mg. 5 Tablet 0    celecoxib (CELEBREX) 200 mg capsule TAKE 1 CAPSULE BY MOUTH ONCE DAILY WITH FOOD FOR 30 DAYS      LISINOPRIL-HYDROCHLOROTHIAZIDE PO Take  by mouth.  rosuvastatin (CRESTOR) 20 mg tablet Take 20 mg by mouth nightly.  traMADoL (ULTRAM) 50 mg tablet Take 50 mg by mouth every six (6) hours as needed for Pain. No current facility-administered medications on file prior to visit. No Known Allergies    OB History    No obstetric history on file. Obstetric Comments   Menarche:  5. LMP: 2014. # of Children:  4. Age at Delivery of First Child:  13. Hysterectomy/oophorectomy:  NO/?.  Breast Bx:  no.  Hx of Breast Feeding:  ? Abhay Finley BCP:  ? . Hormone therapy:  no.                  ROS    Physical Exam  Exam conducted with a chaperone present. Cardiovascular:      Rate and Rhythm: Normal rate and regular rhythm. Heart sounds: Normal heart sounds. Pulmonary:      Breath sounds: Normal breath sounds. Chest:      Breasts: Breasts are symmetrical.         Right: Normal. No swelling, bleeding, inverted nipple, mass, nipple discharge, skin change or tenderness. Left: Normal. No swelling, bleeding, inverted nipple, mass, nipple discharge, skin change or tenderness. Lymphadenopathy:      Cervical:      Right cervical: No superficial, deep or posterior cervical adenopathy. Left cervical: No superficial, deep or posterior cervical adenopathy. Upper Body:      Right upper body: No supraclavicular or axillary adenopathy. Left upper body: No supraclavicular or axillary adenopathy. BREAST ULTRASOUND, Pre-op Planning  Indication: Pre-op planning, BILATERAL breast cancer  Technique: The area was scanned using a high-frequency linear-array near-field transducer  Findings: One bx clip at 1:00, but cannot see the second clip  Impression: Breast cancer  Disposition: Surgery with bracketed wire loc      ASSESSMENT and PLAN    ICD-10-CM ICD-9-CM    1. Bilateral malignant neoplasm of breast in female, estrogen receptor positive, unspecified site of breast ([facility]  C50.911 174.9     Z17.0 V86.0     C50.912        Pt presents for consultation for treatment of BILATERAL breast cancer, clinical stage 1 bilaterally. LEFT breast US definitively visualizes one bx clip at 1:00, but cannot see the second clip; will plan for bracketed wire loc if pt opts for lumpectomy. Previous RIGHT breast US visualized 7mm irregular mass at 9:00. We had a long discussion of options for treatment.  The patient was accompanied by her  during this encounter, and over half the time was spent on counseling and coordination of care. We discussed in depth the pathology results, and the need for treatment. The goals of treatment are to treat the breast, and to reduce risk of local or distant recurrence. Discussed treatment options with risks, complications, benefits, and limitations, including lumpectomy with XRT, and mastectomy with optional reconstruction, both having the same cure rate. LEFT breast masses are close enough together to be removed with a single lumpectomy with bracketed wire loc. Explained the importance of negative margins, and the need for re-excision in the case of positive margins. Also discussed benefit and technique of SLNBx of 3-4 LNs. We also covered risk reduction strategies including XRT, chemotherapy, and hormonal therapy with estrogen blocker. Awaiting HER2/FISH results for one site on the LEFT. If HER2+, chemo will be recommended. If HER2-, will order MammaPrint to help determine whether pt will benefit from chemotherapy. If low risk with negative LNs, chemo may not be recommended. If high risk, will further evaluate need for chemo based on final tumor size and LN involvement. XRT will begin approximately 4 weeks after surgery or chemo. XRT treatments will be 5 days/week, and will last for 4-6 weeks, depending on LN involvement. Discussed estrogen blocker for further risk reduction for ER+ disease. Side effects may include hot flashes and joint pain. Discussed risk of recurrence, and risk reduction with these treatments. We also discussed the pts questions and concerns. This will be an outpatient procedure, with sutures under the skin, and waterproof glue over the incision. Pt may swim in her pool with the waterproof glue in place. She is advised to apply sunscreen to incisions before any tanning. Also reviewed med list; pt may continue her current medications, except as advised by PAT.  Pt should avoid heavy lifting for 7-10 days following surgery, and should stay home from work for 2 weeks. Pt has elected to proceed with BL lumpectomy and SLNBx. She notes preference to have all treatments at [address] [place] [address]. Pt understands and consents to surgery. Will schedule BL lumpectomy and SLNBx for the near future at [address] [place] [address], and scheduling will f/u with the date. This plan was reviewed with the patient and patient agrees. All questions were answered. Total time spent was 40 minutes.     Written by Usha Stockton, as dictated by Dr. Gibson Morataya MD.

## 2021-06-23 NOTE — PROGRESS NOTES
HISTORY OF PRESENT ILLNESS  Stephany Ibarra is a 61 y.o. female. HPI ESTABLISHED patient here to discuss next steps in breast cancer treatment. She now has bilateral breast cancer. Her LEFT breast is tender s/p MRI biopsies x 2.     05/24/21: RIGHT breast bx, 9:00. PATH: IDC, 0.7cm, histologic grade 1-2, ER+(100%)/[place]+(95%)/HER2-, Ki-67 <5%. 06/16/21: LEFT breast MRI-guided biopsies. PATH:  - Site A, posterior: Invasive and in situ mammary carcinoma, favor lobular, 6mm, overall grade 1, ER+(99%)/[place]+(60%)/HER2-, Ki-67 19%. - Site B, anterior: Invasive and in situ mammary carcinoma, favor lobular, 11mm, overall grade 1, ER+(99%)/[place]-, HER2 equivocal, FISH pending, Ki-67 18%. MammaPrint pending. MammaPrint and FISH pending for Site B. Breast imaging-  MRI Results (most recent):  Results from Hospital Encounter encounter on 06/16/21    MRI BX BREAST VAC LT ADDL W/CLIP AND SPECIMEN    Narrative  INDICATION: Newly diagnosed right breast cancer. 2 suspicious areas of  enhancement in the left breast on breast MRI. COMPARISON: June 8, 2021 breast MRI    TECHNIQUE:  The risks and benefits of the procedure were discussed with the patient. Written  consent was obtained. Preliminary T1-weighted sagittal MR imaging of the left  breast was performed before and after the intravenous administration of 7.5 mL  Gadavist.    Site A/posterior: The mass in the left breast at the 2:00 posterior position was  targeted. The breast was prepped with ChloraPrep. 1% lidocaine was injected  locally in the skin. Lidocaine with epinephrine was injected into the deep  tissues of the breast. A 9-gauge Suros introducer needle was then advanced into  the breast using an introducer grid. MR imaging of the breast was then repeated. The images demonstrated accurate positioning of the introducer. A 9-gauge Suros  vacuum assisted biopsy needle was then inserted through the introducer. 6 core  samples were obtained.  Post procedure MR imaging demonstrated accurate sampling  of the targeted lesion. A biopsy clip was deployed at the biopsy site. The  introducer was then removed. Site B/anterior: The focal nonmass-like enhancement in the left breast at the  1-2 o'clock position, middle coronal third was targeted. The breast was prepped  with ChloraPrep. 1% lidocaine was injected locally in the skin. Lidocaine with  epinephrine was injected into the deep tissues of the breast. A 9-gauge Suros  introducer needle was then advanced into the breast using an introducer grid. MR  imaging of the breast was then repeated. The images demonstrated accurate  positioning of the introducer. A 9-gauge Suros vacuum assisted biopsy needle was  then inserted through the introducer. 6 core samples were obtained. Post  procedure MR imaging demonstrated accurate sampling of the targeted lesion. A  biopsy clip was deployed at the biopsy site. The introducer was then removed. The patient tolerated the procedure well. There were no immediate complications. Pressure was applied locally on the breast until hemostasis was achieved. The  breast was dressed appropriately. Postprocedure left unilateral digital mammography demonstrates accurate  positioning of both clips and no postbiopsy complication. Impression  Successful MR guided biopsy of 2 left breast lesions. Biopsy clips  are appropriately positioned. Pathology results are pending.       ROS    Physical Exam    ASSESSMENT and PLAN  {ASSESSMENT/PLAN:[id number]}

## 2021-06-24 DIAGNOSIS — C50.911 MALIGNANT NEOPLASM OF RIGHT FEMALE BREAST, UNSPECIFIED ESTROGEN RECEPTOR STATUS, UNSPECIFIED SITE OF BREAST (HCC): ICD-10-CM

## 2021-06-24 DIAGNOSIS — C50.912 MALIGNANT NEOPLASM OF LEFT FEMALE BREAST, UNSPECIFIED ESTROGEN RECEPTOR STATUS, UNSPECIFIED SITE OF BREAST (HCC): Primary | ICD-10-CM

## 2021-06-25 ENCOUNTER — HOSPITAL ENCOUNTER (OUTPATIENT)
Dept: PREADMISSION TESTING | Age: 61
Discharge: HOME | End: 2021-06-25
Payer: COMMERCIAL

## 2021-06-25 VITALS
OXYGEN SATURATION: 100 % | HEIGHT: 60 IN | TEMPERATURE: 98.2 F | BODY MASS INDEX: 35.73 KG/M2 | SYSTOLIC BLOOD PRESSURE: 139 MMHG | WEIGHT: 182 LBS | HEART RATE: 71 BPM | RESPIRATION RATE: 18 BRPM | DIASTOLIC BLOOD PRESSURE: 70 MMHG

## 2021-06-25 LAB
ANION GAP SERPL CALC-SCNC: 7 MMOL/L (ref 5–15)
BUN SERPL-MCNC: 16 MG/DL (ref 6–20)
BUN/CREAT SERPL: 30 (ref 12–20)
CALCIUM SERPL-MCNC: 9.4 MG/DL (ref 8.5–10.1)
CHLORIDE SERPL-SCNC: 108 MMOL/L (ref 97–108)
CO2 SERPL-SCNC: 25 MMOL/L (ref 21–32)
CREAT SERPL-MCNC: 0.53 MG/DL (ref 0.55–1.02)
GLUCOSE SERPL-MCNC: 86 MG/DL (ref 65–100)
POTASSIUM SERPL-SCNC: 4.6 MMOL/L (ref 3.5–5.1)
SARS-COV-2, XPLCVT: NOT DETECTED
SODIUM SERPL-SCNC: 140 MMOL/L (ref 136–145)
SOURCE, COVRS: NORMAL

## 2021-06-25 PROCEDURE — 36415 COLL VENOUS BLD VENIPUNCTURE: CPT

## 2021-06-25 PROCEDURE — U0003 INFECTIOUS AGENT DETECTION BY NUCLEIC ACID (DNA OR RNA); SEVERE ACUTE RESPIRATORY SYNDROME CORONAVIRUS 2 (SARS-COV-2) (CORONAVIRUS DISEASE [COVID-19]), AMPLIFIED PROBE TECHNIQUE, MAKING USE OF HIGH THROUGHPUT TECHNOLOGIES AS DESCRIBED BY CMS-2020-01-R: HCPCS

## 2021-06-25 PROCEDURE — 80048 BASIC METABOLIC PNL TOTAL CA: CPT

## 2021-06-25 PROCEDURE — 93005 ELECTROCARDIOGRAM TRACING: CPT

## 2021-06-25 RX ORDER — ROSUVASTATIN CALCIUM 20 MG/1
20 TABLET, COATED ORAL DAILY
COMMUNITY

## 2021-06-25 RX ORDER — CHOLECALCIFEROL (VITAMIN D3) 125 MCG
1 CAPSULE ORAL DAILY
COMMUNITY

## 2021-06-25 RX ORDER — OMEPRAZOLE 20 MG/1
20 CAPSULE, DELAYED RELEASE ORAL DAILY
COMMUNITY

## 2021-06-25 NOTE — PERIOP NOTES
[address] [address] [facility]   MAIN OR                                  [phone]   MAIN PRE OP                          [phone]                                                                                AMBULATORY PRE OP          [phone]  PRE-ADMISSION TESTING    [phone]   Surgery Date: Tuesday, June 29, 2021*       Is surgery arrival time given by surgeon? NO  If NO, [facility] staff will call you between 3 and 7pm the day before your surgery with your arrival time. (If your surgery is on a Monday, we will call you the Friday before.)    Call [phone] after 7pm Monday-Friday if you did not receive this call. INSTRUCTIONS BEFORE YOUR SURGERY   When You  Arrive Arrive at the [address] [facility] [place] on the day of your surgery  Have your insurance card, photo ID, and any copayment (if needed)   Food   and   Drink NO food or drink after midnight the night before surgery    This means NO water, gum, mints, coffee, juice, etc.  No alcohol (beer, wine, liquor) 24 hours before and after surgery   Medications to   TAKE   Morning of Surgery MEDICATIONS TO TAKE THE MORNING OF SURGERY WITH A SIP OF WATER:    Omeprazole   Tramadol if needed   Medications  To  STOP      7 days before surgery  Non-Steroidal anti-inflammatory Drugs (NSAID's): for example, Ibuprofen (Advil, Motrin), Naproxen (Aleve)   Aspirin, if taking for pain    Herbal supplements, vitamins, and fish oil   Other: Celebrex  (Pain medications not listed above, including Tylenol may be taken)   Blood  Thinners  If you take  Aspirin, Plavix, Coumadin, or any blood-thinning or anti-blood clot medicine, talk to the doctor who prescribed the medications for pre-operative instructions.    Bathing Clothing  Jewelry  Valuables      If you shower the morning of surgery, please do not apply anything to your skin (lotions, powders, deodorant, or makeup, especially catrachito)   Follow Chlorhexidine [employer] body wash instructions provided to you during PAT appointment. Begin 3 days prior to surgery.  Do not shave or trim anywhere 24 hours before surgery   Wear your hair loose or down; no pony-tails, buns, or metal hair clips   Wear loose, comfortable, clean clothes   Wear glasses instead of contacts  [employer] money, valuables, and jewelry, including body piercings, at home   If you were given an [employer], bring it on day of surgery. Going Home - or Spending the Night  SAME-DAY SURGERY: You must have a responsible adult drive you home and stay with you 24 hours after surgery   ADMITS: If your doctor is keeping you in the hospital after surgery, leave personal belongings/luggage in your car until you have a hospital room number. Hospital discharge time is 12 noon  Drivers must be here before 12 noon unless you are told differently   Special Instructions Free  parking available from 7am until 5pm.     Follow all instructions so your surgery wont be cancelled. Please, be on time. If a situation occurs and you are delayed the day of surgery, call  [phone]. If your physical condition changes (like a fever, cold, flu, etc.) call your surgeon. Home medication(s) reviewed and verified via LIST  And VERBAL   during PAT appointment. The patient was contacted IN-PERSON  The patient verbalizes understanding of all instructions and  DOES NOT   need reinforcement.

## 2021-06-27 LAB
ATRIAL RATE: 65 BPM
CALCULATED P AXIS, ECG09: 11 DEGREES
CALCULATED R AXIS, ECG10: -15 DEGREES
CALCULATED T AXIS, ECG11: -5 DEGREES
DIAGNOSIS, 93000: NORMAL
P-R INTERVAL, ECG05: 162 MS
Q-T INTERVAL, ECG07: 414 MS
QRS DURATION, ECG06: 76 MS
QTC CALCULATION (BEZET), ECG08: 430 MS
VENTRICULAR RATE, ECG03: 65 BPM

## 2021-06-28 ENCOUNTER — ANESTHESIA EVENT (OUTPATIENT)
Dept: SURGERY | Age: 61
End: 2021-06-28
Payer: COMMERCIAL

## 2021-06-29 ENCOUNTER — APPOINTMENT (OUTPATIENT)
Dept: MAMMOGRAPHY | Age: 61
End: 2021-06-29
Attending: SURGERY
Payer: COMMERCIAL

## 2021-06-29 ENCOUNTER — HOSPITAL ENCOUNTER (OUTPATIENT)
Age: 61
Setting detail: OUTPATIENT SURGERY
Discharge: HOME | End: 2021-06-29
Attending: SURGERY | Admitting: SURGERY
Payer: COMMERCIAL

## 2021-06-29 ENCOUNTER — ANESTHESIA (OUTPATIENT)
Dept: SURGERY | Age: 61
End: 2021-06-29
Payer: COMMERCIAL

## 2021-06-29 ENCOUNTER — APPOINTMENT (OUTPATIENT)
Dept: NUCLEAR MEDICINE | Age: 61
End: 2021-06-29
Attending: SURGERY
Payer: COMMERCIAL

## 2021-06-29 ENCOUNTER — APPOINTMENT (OUTPATIENT)
Dept: GENERAL RADIOLOGY | Age: 61
End: 2021-06-29
Attending: SURGERY
Payer: COMMERCIAL

## 2021-06-29 VITALS
RESPIRATION RATE: 12 BRPM | SYSTOLIC BLOOD PRESSURE: 119 MMHG | HEART RATE: 102 BPM | DIASTOLIC BLOOD PRESSURE: 85 MMHG | OXYGEN SATURATION: 96 % | TEMPERATURE: 98.1 F

## 2021-06-29 DIAGNOSIS — C50.911 MALIGNANT NEOPLASM OF RIGHT FEMALE BREAST, UNSPECIFIED ESTROGEN RECEPTOR STATUS, UNSPECIFIED SITE OF BREAST (HCC): ICD-10-CM

## 2021-06-29 DIAGNOSIS — C50.911 BILATERAL MALIGNANT NEOPLASM OF BREAST IN FEMALE, ESTROGEN RECEPTOR POSITIVE, UNSPECIFIED SITE OF BREAST (HCC): Primary | ICD-10-CM

## 2021-06-29 DIAGNOSIS — C50.912 MALIGNANT NEOPLASM OF LEFT FEMALE BREAST, UNSPECIFIED ESTROGEN RECEPTOR STATUS, UNSPECIFIED SITE OF BREAST (HCC): ICD-10-CM

## 2021-06-29 DIAGNOSIS — C50.912 BILATERAL MALIGNANT NEOPLASM OF BREAST IN FEMALE, ESTROGEN RECEPTOR POSITIVE, UNSPECIFIED SITE OF BREAST (HCC): Primary | ICD-10-CM

## 2021-06-29 DIAGNOSIS — Z17.0 BILATERAL MALIGNANT NEOPLASM OF BREAST IN FEMALE, ESTROGEN RECEPTOR POSITIVE, UNSPECIFIED SITE OF BREAST (HCC): Primary | ICD-10-CM

## 2021-06-29 PROCEDURE — 74011250636 HC RX REV CODE- 250/636: Performed by: NURSE ANESTHETIST, CERTIFIED REGISTERED

## 2021-06-29 PROCEDURE — A9520 TC99 TILMANOCEPT DIAG 0.5MCI: HCPCS

## 2021-06-29 PROCEDURE — 74011000250 HC RX REV CODE- 250

## 2021-06-29 PROCEDURE — 77030040361 HC SLV COMPR DVT MDII -B

## 2021-06-29 PROCEDURE — 74011250636 HC RX REV CODE- 250/636: Performed by: SURGERY

## 2021-06-29 PROCEDURE — 74011000250 HC RX REV CODE- 250: Performed by: NURSE ANESTHETIST, CERTIFIED REGISTERED

## 2021-06-29 PROCEDURE — 77030003460 HC NDL BIOP BRST COOK -B

## 2021-06-29 PROCEDURE — 76210000046 HC AMBSU PH II REC FIRST 0.5 HR: Performed by: SURGERY

## 2021-06-29 PROCEDURE — 77030010507 HC ADH SKN DERMBND J&J -B: Performed by: SURGERY

## 2021-06-29 PROCEDURE — 74011000250 HC RX REV CODE- 250: Performed by: RADIOLOGY

## 2021-06-29 PROCEDURE — 77030040922 HC BLNKT HYPOTHRM STRY -A

## 2021-06-29 PROCEDURE — 77030034626 HC LIGASURE SM JAW SEAL OPN SURG COVD -E: Performed by: SURGERY

## 2021-06-29 PROCEDURE — 19301 PARTIAL MASTECTOMY: CPT | Performed by: SURGERY

## 2021-06-29 PROCEDURE — 74011250636 HC RX REV CODE- 250/636: Performed by: ANESTHESIOLOGY

## 2021-06-29 PROCEDURE — 76060000064 HC AMB SURG ANES 2 TO 2.5 HR: Performed by: SURGERY

## 2021-06-29 PROCEDURE — 77030020143 HC AIRWY LARYN INTUB CGAS -A: Performed by: ANESTHESIOLOGY

## 2021-06-29 PROCEDURE — 74011000250 HC RX REV CODE- 250: Performed by: SURGERY

## 2021-06-29 PROCEDURE — 88307 TISSUE EXAM BY PATHOLOGIST: CPT

## 2021-06-29 PROCEDURE — 76030000004 HC AMB SURG OR TIME 2 TO 2.5: Performed by: SURGERY

## 2021-06-29 PROCEDURE — 14001 TIS TRNFR TRUNK 10.1-30SQCM: CPT | Performed by: SURGERY

## 2021-06-29 PROCEDURE — 77030041680 HC PNCL ELECSURG SMK EVAC CNMD -B: Performed by: SURGERY

## 2021-06-29 PROCEDURE — 38525 BIOPSY/REMOVAL LYMPH NODES: CPT | Performed by: SURGERY

## 2021-06-29 PROCEDURE — 2709999900 HC NON-CHARGEABLE SUPPLY: Performed by: SURGERY

## 2021-06-29 PROCEDURE — 77030018836 HC SOL IRR NACL ICUM -A: Performed by: SURGERY

## 2021-06-29 PROCEDURE — 77030011267 HC ELECTRD BLD COVD -A: Performed by: SURGERY

## 2021-06-29 PROCEDURE — 77030031139 HC SUT VCRL2 J&J -A: Performed by: SURGERY

## 2021-06-29 PROCEDURE — 19282 PERQ DEVICE BREAST EA IMAG: CPT

## 2021-06-29 PROCEDURE — 77030002933 HC SUT MCRYL J&J -A: Performed by: SURGERY

## 2021-06-29 PROCEDURE — 77030013079 HC BLNKT BAIR HGGR 3M -A: Performed by: ANESTHESIOLOGY

## 2021-06-29 PROCEDURE — 76210000036 HC AMBSU PH I REC 1.5 TO 2 HR: Performed by: SURGERY

## 2021-06-29 PROCEDURE — 19281 PERQ DEVICE BREAST 1ST IMAG: CPT

## 2021-06-29 RX ORDER — BUPIVACAINE HYDROCHLORIDE AND EPINEPHRINE 5; 5 MG/ML; UG/ML
30 INJECTION, SOLUTION EPIDURAL; INTRACAUDAL; PERINEURAL ONCE
Status: COMPLETED | OUTPATIENT
Start: 2021-06-29 | End: 2021-06-29

## 2021-06-29 RX ORDER — ALBUTEROL SULFATE 0.83 MG/ML
2.5 SOLUTION RESPIRATORY (INHALATION) AS NEEDED
Status: DISCONTINUED | OUTPATIENT
Start: 2021-06-29 | End: 2021-06-29 | Stop reason: HOSPADM

## 2021-06-29 RX ORDER — LIDOCAINE HYDROCHLORIDE 10 MG/ML
0.1 INJECTION, SOLUTION EPIDURAL; INFILTRATION; INTRACAUDAL; PERINEURAL AS NEEDED
Status: DISCONTINUED | OUTPATIENT
Start: 2021-06-29 | End: 2021-06-29 | Stop reason: HOSPADM

## 2021-06-29 RX ORDER — SODIUM CHLORIDE, SODIUM LACTATE, POTASSIUM CHLORIDE, CALCIUM CHLORIDE 600; 310; 30; 20 MG/100ML; MG/100ML; MG/100ML; MG/100ML
INJECTION, SOLUTION INTRAVENOUS
Status: DISCONTINUED | OUTPATIENT
Start: 2021-06-29 | End: 2021-06-29 | Stop reason: HOSPADM

## 2021-06-29 RX ORDER — ONDANSETRON 2 MG/ML
4 INJECTION INTRAMUSCULAR; INTRAVENOUS AS NEEDED
Status: DISCONTINUED | OUTPATIENT
Start: 2021-06-29 | End: 2021-06-29 | Stop reason: HOSPADM

## 2021-06-29 RX ORDER — MIDAZOLAM HYDROCHLORIDE 1 MG/ML
INJECTION, SOLUTION INTRAMUSCULAR; INTRAVENOUS AS NEEDED
Status: DISCONTINUED | OUTPATIENT
Start: 2021-06-29 | End: 2021-06-29 | Stop reason: HOSPADM

## 2021-06-29 RX ORDER — SODIUM CHLORIDE, SODIUM LACTATE, POTASSIUM CHLORIDE, CALCIUM CHLORIDE 600; 310; 30; 20 MG/100ML; MG/100ML; MG/100ML; MG/100ML
125 INJECTION, SOLUTION INTRAVENOUS CONTINUOUS
Status: DISCONTINUED | OUTPATIENT
Start: 2021-06-29 | End: 2021-06-29 | Stop reason: HOSPADM

## 2021-06-29 RX ORDER — EPHEDRINE SULFATE/0.9% NACL/PF 50 MG/5 ML
SYRINGE (ML) INTRAVENOUS AS NEEDED
Status: DISCONTINUED | OUTPATIENT
Start: 2021-06-29 | End: 2021-06-29 | Stop reason: HOSPADM

## 2021-06-29 RX ORDER — FENTANYL CITRATE 50 UG/ML
INJECTION, SOLUTION INTRAMUSCULAR; INTRAVENOUS AS NEEDED
Status: DISCONTINUED | OUTPATIENT
Start: 2021-06-29 | End: 2021-06-29 | Stop reason: HOSPADM

## 2021-06-29 RX ORDER — DIPHENHYDRAMINE HYDROCHLORIDE 50 MG/ML
12.5 INJECTION, SOLUTION INTRAMUSCULAR; INTRAVENOUS AS NEEDED
Status: DISCONTINUED | OUTPATIENT
Start: 2021-06-29 | End: 2021-06-29 | Stop reason: HOSPADM

## 2021-06-29 RX ORDER — PROPOFOL 10 MG/ML
INJECTION, EMULSION INTRAVENOUS AS NEEDED
Status: DISCONTINUED | OUTPATIENT
Start: 2021-06-29 | End: 2021-06-29 | Stop reason: HOSPADM

## 2021-06-29 RX ORDER — LIDOCAINE HYDROCHLORIDE 10 MG/ML
4 INJECTION, SOLUTION EPIDURAL; INFILTRATION; INTRACAUDAL; PERINEURAL
Status: DISCONTINUED | OUTPATIENT
Start: 2021-06-29 | End: 2021-06-29 | Stop reason: HOSPADM

## 2021-06-29 RX ORDER — LIDOCAINE HYDROCHLORIDE 10 MG/ML
4 INJECTION, SOLUTION EPIDURAL; INFILTRATION; INTRACAUDAL; PERINEURAL
Status: COMPLETED | OUTPATIENT
Start: 2021-06-29 | End: 2021-06-29

## 2021-06-29 RX ORDER — HYDROMORPHONE HYDROCHLORIDE 1 MG/ML
0.5 INJECTION, SOLUTION INTRAMUSCULAR; INTRAVENOUS; SUBCUTANEOUS
Status: DISCONTINUED | OUTPATIENT
Start: 2021-06-29 | End: 2021-06-29 | Stop reason: HOSPADM

## 2021-06-29 RX ORDER — HYDROCODONE BITARTRATE AND ACETAMINOPHEN 5; 325 MG/1; MG/1
1 TABLET ORAL
Qty: 25 TABLET | Refills: 0 | Status: SHIPPED | OUTPATIENT
Start: 2021-06-29 | End: 2021-07-06

## 2021-06-29 RX ORDER — KETOROLAC TROMETHAMINE 30 MG/ML
INJECTION, SOLUTION INTRAMUSCULAR; INTRAVENOUS AS NEEDED
Status: DISCONTINUED | OUTPATIENT
Start: 2021-06-29 | End: 2021-06-29 | Stop reason: HOSPADM

## 2021-06-29 RX ORDER — SODIUM CHLORIDE, SODIUM LACTATE, POTASSIUM CHLORIDE, CALCIUM CHLORIDE 600; 310; 30; 20 MG/100ML; MG/100ML; MG/100ML; MG/100ML
150 INJECTION, SOLUTION INTRAVENOUS CONTINUOUS
Status: DISCONTINUED | OUTPATIENT
Start: 2021-06-29 | End: 2021-06-29 | Stop reason: HOSPADM

## 2021-06-29 RX ORDER — HYDROMORPHONE HYDROCHLORIDE 2 MG/ML
INJECTION, SOLUTION INTRAMUSCULAR; INTRAVENOUS; SUBCUTANEOUS AS NEEDED
Status: DISCONTINUED | OUTPATIENT
Start: 2021-06-29 | End: 2021-06-29 | Stop reason: HOSPADM

## 2021-06-29 RX ORDER — DEXAMETHASONE SODIUM PHOSPHATE 4 MG/ML
INJECTION, SOLUTION INTRA-ARTICULAR; INTRALESIONAL; INTRAMUSCULAR; INTRAVENOUS; SOFT TISSUE AS NEEDED
Status: DISCONTINUED | OUTPATIENT
Start: 2021-06-29 | End: 2021-06-29 | Stop reason: HOSPADM

## 2021-06-29 RX ORDER — LIDOCAINE HYDROCHLORIDE 10 MG/ML
INJECTION, SOLUTION EPIDURAL; INFILTRATION; INTRACAUDAL; PERINEURAL
Status: COMPLETED
Start: 2021-06-29 | End: 2021-06-29

## 2021-06-29 RX ORDER — ONDANSETRON 2 MG/ML
INJECTION INTRAMUSCULAR; INTRAVENOUS AS NEEDED
Status: DISCONTINUED | OUTPATIENT
Start: 2021-06-29 | End: 2021-06-29 | Stop reason: HOSPADM

## 2021-06-29 RX ORDER — LIDOCAINE HYDROCHLORIDE AND EPINEPHRINE 10; 10 MG/ML; UG/ML
30 INJECTION, SOLUTION INFILTRATION; PERINEURAL ONCE
Status: DISCONTINUED | OUTPATIENT
Start: 2021-06-29 | End: 2021-06-29 | Stop reason: HOSPADM

## 2021-06-29 RX ORDER — LIDOCAINE HYDROCHLORIDE 20 MG/ML
INJECTION, SOLUTION EPIDURAL; INFILTRATION; INTRACAUDAL; PERINEURAL AS NEEDED
Status: DISCONTINUED | OUTPATIENT
Start: 2021-06-29 | End: 2021-06-29 | Stop reason: HOSPADM

## 2021-06-29 RX ADMIN — SODIUM CHLORIDE 200 MCG: 9 INJECTION INTRAMUSCULAR; INTRAVENOUS; SUBCUTANEOUS at 13:00

## 2021-06-29 RX ADMIN — HYDROMORPHONE HYDROCHLORIDE 0.5 MG: 1 INJECTION, SOLUTION INTRAMUSCULAR; INTRAVENOUS; SUBCUTANEOUS at 15:30

## 2021-06-29 RX ADMIN — FENTANYL CITRATE 25 MCG: 50 INJECTION, SOLUTION INTRAMUSCULAR; INTRAVENOUS at 14:12

## 2021-06-29 RX ADMIN — SODIUM CHLORIDE 100 MCG: 9 INJECTION INTRAMUSCULAR; INTRAVENOUS; SUBCUTANEOUS at 14:33

## 2021-06-29 RX ADMIN — ONDANSETRON HYDROCHLORIDE 4 MG: 2 SOLUTION INTRAMUSCULAR; INTRAVENOUS at 14:33

## 2021-06-29 RX ADMIN — SODIUM CHLORIDE 200 MCG: 9 INJECTION INTRAMUSCULAR; INTRAVENOUS; SUBCUTANEOUS at 13:26

## 2021-06-29 RX ADMIN — SODIUM CHLORIDE, POTASSIUM CHLORIDE, SODIUM LACTATE AND CALCIUM CHLORIDE 150 ML/HR: 600; 310; 30; 20 INJECTION, SOLUTION INTRAVENOUS at 10:29

## 2021-06-29 RX ADMIN — KETOROLAC TROMETHAMINE 30 MG: 30 INJECTION, SOLUTION INTRAMUSCULAR; INTRAVENOUS at 14:33

## 2021-06-29 RX ADMIN — FENTANYL CITRATE 25 MCG: 50 INJECTION, SOLUTION INTRAMUSCULAR; INTRAVENOUS at 13:59

## 2021-06-29 RX ADMIN — PROPOFOL 200 MG: 10 INJECTION, EMULSION INTRAVENOUS at 12:46

## 2021-06-29 RX ADMIN — DEXAMETHASONE SODIUM PHOSPHATE 4 MG: 4 INJECTION, SOLUTION INTRAMUSCULAR; INTRAVENOUS at 13:00

## 2021-06-29 RX ADMIN — SODIUM CHLORIDE 200 MCG: 9 INJECTION INTRAMUSCULAR; INTRAVENOUS; SUBCUTANEOUS at 13:08

## 2021-06-29 RX ADMIN — SODIUM CHLORIDE, POTASSIUM CHLORIDE, SODIUM LACTATE AND CALCIUM CHLORIDE: 600; 310; 30; 20 INJECTION, SOLUTION INTRAVENOUS at 13:52

## 2021-06-29 RX ADMIN — CEFAZOLIN SODIUM 2 G: 1 POWDER, FOR SOLUTION INTRAMUSCULAR; INTRAVENOUS at 13:00

## 2021-06-29 RX ADMIN — SODIUM CHLORIDE, POTASSIUM CHLORIDE, SODIUM LACTATE AND CALCIUM CHLORIDE: 600; 310; 30; 20 INJECTION, SOLUTION INTRAVENOUS at 12:06

## 2021-06-29 RX ADMIN — FENTANYL CITRATE 25 MCG: 50 INJECTION, SOLUTION INTRAMUSCULAR; INTRAVENOUS at 13:00

## 2021-06-29 RX ADMIN — SODIUM CHLORIDE 100 MCG: 9 INJECTION INTRAMUSCULAR; INTRAVENOUS; SUBCUTANEOUS at 14:30

## 2021-06-29 RX ADMIN — FENTANYL CITRATE 25 MCG: 50 INJECTION, SOLUTION INTRAMUSCULAR; INTRAVENOUS at 12:46

## 2021-06-29 RX ADMIN — HYDROMORPHONE HYDROCHLORIDE 0.4 MG: 2 INJECTION INTRAMUSCULAR; INTRAVENOUS; SUBCUTANEOUS at 14:21

## 2021-06-29 RX ADMIN — FENTANYL CITRATE 50 MCG: 50 INJECTION, SOLUTION INTRAMUSCULAR; INTRAVENOUS at 13:48

## 2021-06-29 RX ADMIN — FENTANYL CITRATE 25 MCG: 50 INJECTION, SOLUTION INTRAMUSCULAR; INTRAVENOUS at 13:16

## 2021-06-29 RX ADMIN — SODIUM CHLORIDE 200 MCG: 9 INJECTION INTRAMUSCULAR; INTRAVENOUS; SUBCUTANEOUS at 13:20

## 2021-06-29 RX ADMIN — LIDOCAINE HYDROCHLORIDE 4 ML: 10 INJECTION, SOLUTION EPIDURAL; INFILTRATION; INTRACAUDAL; PERINEURAL at 08:00

## 2021-06-29 RX ADMIN — LIDOCAINE HYDROCHLORIDE 1 ML: 10 INJECTION INFILTRATION at 08:00

## 2021-06-29 RX ADMIN — SODIUM CHLORIDE 100 MCG: 9 INJECTION INTRAMUSCULAR; INTRAVENOUS; SUBCUTANEOUS at 14:46

## 2021-06-29 RX ADMIN — Medication 10 MG: at 13:26

## 2021-06-29 RX ADMIN — FENTANYL CITRATE 50 MCG: 50 INJECTION, SOLUTION INTRAMUSCULAR; INTRAVENOUS at 13:14

## 2021-06-29 RX ADMIN — MIDAZOLAM 2 MG: 1 INJECTION, SOLUTION INTRAMUSCULAR; INTRAVENOUS at 12:39

## 2021-06-29 RX ADMIN — LIDOCAINE HYDROCHLORIDE 100 MG: 20 INJECTION, SOLUTION INTRAVENOUS at 12:46

## 2021-06-29 RX ADMIN — FENTANYL CITRATE 25 MCG: 50 INJECTION, SOLUTION INTRAMUSCULAR; INTRAVENOUS at 14:09

## 2021-06-29 NOTE — PERIOP NOTES
PACU IN REPORT FROM ANESTHESIA    Verbal report received from   Kimberly Wu   [] MD/DO-Anesthesiologist    [x] CRNA   [] with student    CHOICE ANESTHESIA:  [] GENERAL  [] TIVA  [x] MAC  [x] LOCAL  [] REGIONAL  [] SPINAL   [] EPIDURAL   **Note the anesthesia record for medications given intraoperatively. **           [] E.R.A.S. PROTOCOL    SURGICAL PROCEDURE: Procedure(s) (LRB):  LEFT BREAST LUMPECTOMY WITH BRACKETED NEEDLE LOCALIZATION, LEFT BREAST SENTINEL NODE BIOPSY, RIGHT BREAST LUMPECTOMY WITH ULTRASOUND, RIGHT BREAST SENTINEL NODE BIOPSY (Bilateral)  . (Bilateral)     SURGEON: Zee Ibarra MD.    Brief Initial Visual Assessment:    Patient Age: [] Infant(1-12mo)      []Pediatric(1-13yrs)    [] Adolescent(13-18yrs)    [x] Adult(18-65yrs)      []Geriatric Adult(>65yrs). Patient    [] Alert           [x]Calm & Cooperative      [] Anxious  Appearance: [x] Drowsy      [] Sedated      [] Unresponsive     Oriented x  3            Airway:     [x] Patent                          [] Obstructs easily/Obstructed on arrival   [] \"Difficult Airway\" report by Anesthesia                        [] Airway improved with head/airway repositioning                       Airway Adjuncts Present: [] Oral Airway    [] Nasal Trumpet    [] ETT    [] LMA            Respiratory  [x] Even   [] Labored   [] Shallow   [] Tachypnea   [] Bradypnea  Pattern:    [x] Non-Labored  [] VENT and/or respiratory assistance     being provided. Skin:     [x] Pink [] Dusky    [] Pale        [x] Warm    [] Hot [] Cool       [] Cold   [x]Dry [] Moist [] Diaphoretic     Membranes:  [x] Pink [] Pale       [x] Moist [] Dry     [] Crusty     Pain:   [x] No Acute Discomfort. 3  /10 Scale [x] Verbal Numeric   [] Moderate Discomfort.      [] V.A.S. [] Acute Discomfort.                [] A.N.V.    [] Chronic-Issue Related Discomfort.   [] F.L.A.C.C. Note E-MAR for medications administered.   []Faces, Camacho/Rabago    Note assessments documented in flowsheets; any assessment variants to be found in comments or narrative perioperative nurse notes.        Post-anesthesia care now assumed by [facility] BSN, RN-BC

## 2021-06-29 NOTE — DISCHARGE INSTRUCTIONS
Discharge Instructions from Dr. Juan R Pena    · I will call you with the pathology results, typically within 1 week from today. · You may shower, but no hot tubs, swimming pools, or baths until your incision is healed. · No heavy lifting with the affected extremity (nothing greater than 5 pounds), and limit its use for the next 4-5 days. · You may use an ice pack for comfort for the next couple of days, but do not place ice directly on the skin. Rather, use a towel or clothing to serve as a barrier between skin and ice to prevent injury. · If I placed a drain, follow the drain instructions provided, especially as you keep a record of the drain output. · Follow medication instructions carefully. · Watch for signs of infection as listed below. · Redness  · Swelling  · Drainage from the incision or from your nipple that appears infected  · Fever over 101 degrees for consecutive readings, or over 99.5 if you are currently undergoing chemotherapy. · Call our office (number is below) for a follow-up appointment. · If you have any problems, our phone number is [phone]. DISCHARGE SUMMARY from your Nurse      PATIENT INSTRUCTIONS    After general anesthesia or intravenous sedation, for 24 hours or while taking prescription Narcotics:  · Limit your activities  · Do not drive and operate hazardous machinery  · Do not make important personal or business decisions  · Do  not drink alcoholic beverages  · If you have not urinated within 8 hours after discharge, please contact your surgeon on call.     Report the following to your surgeon:  · Excessive pain, swelling, redness or odor of or around the surgical area  · Temperature over 100.5  · Nausea and vomiting lasting longer than 4 hours or if unable to take medications  · Any signs of decreased circulation or nerve impairment to extremity: change in color, persistent  numbness, tingling, coldness or increase pain  · Any questions      GOOD HELP TO FIGHT AN INFECTION  Here are a few tip to help reduce the chance of getting an infection after surgery:   Wash Your Hands   Good handwashing is the most important thing you and your caregiver can do.  Wash before and after caring for any wounds. Dry your hand with a clean towel.  Wash with soap and water for at least 20 seconds. A TIP: sing the \"Happy Birthday\" song through one time while washing to help with the timing.  Use a hand  in between washings.  Shower   When your surgeon says it is OK to take a shower, use a new bar of antibacterial soap (if that is what you use, and keep that bar of soap ONLY for your use), or antibacterial body wash.  Use a clean wash cloth or sponge when you bathe.  Dry off with a clean towel  after every bath - be careful around any wounds, skin staples, sutures or surgical glue over/on wounds.  Do not enter swimming pools, hot tubs, lakes, rivers and/or ocean until wounds are healed and your doctor/surgeon says it is OK.  Use Clean Sheets   Sleep on freshly laundered sheets after your surgery.  Keep the surgery site covered with a clean, dry bandage (if instructed to do so). If the bandage becomes soiled, reapply a new, dry, clean bandage.  Do not allow pets to sleep with you while your wound is healing.  Lifestyle Modification and Controlling Your Blood Sugar   Smoking slows wound healing. Stop smoking and limit exposure to second-hand smoke.  High blood sugar slows wound healing. Eat a well-balanced diet to provide proper nutrition while healing   Monitor your blood sugar (if you are a diabetic) and take your medications as you are suppose to so you can control you blood sugar after surgery. COUGH AND DEEP BREATHE    Breathing deeply and coughing are very important exercises to do after surgery. Deep breathing and coughing open the little air tubes and air sacks in your lungs.        You take deep breaths every day. You may not even notice - it is just something you do when you sigh or yawn. It is a natural exercise you do to keep these air passages open. After surgery, take deep breaths and cough, on purpose. DIRECTIONS:  · Take 10 to 15 slow deep breaths every hour while awake. · Breathe in deeply, and hold it for 2 seconds. · Exhale slowly through puckered lips, like blowing up a balloon. · After every 4th or 5th deep breath, hug your pillow to your chest or belly and give a hard, deep cough. Yes, it will probably hurt. But doing this exercise is a very important part of healing after surgery. Take your pain medicine to help you do this exercise without too much pain. Coughing and deep breathing help prevent bronchitis and pneumonia after surgery. If you had chest or belly surgery, use a pillow as a \"hug isabelle\" and hold it tightly to your chest or belly when you cough. ANKLE PUMPS    Ankle pumps increase the circulation of oxygenated blood to your lower extremities and decrease your risk for circulation problems such as blood clots. They also stretch the muscles, tendons and ligaments in your foot and ankle, and prevent joint contracture in the ankle and foot, especially after surgeries on the legs. It is important to do ankle pump exercises regularly after surgery because immobility increases your risk for developing a blood clot. Your doctor may also have you take an Aspirin for the next few days as well. If your doctor did not ask you to take an Aspirin, consult with him before starting Aspirin therapy on your own. The exercise is quite simple. · Slowly point your foot forward, feeling the muscles on the top of your lower leg stretch, and hold this position for 5 seconds. · Next, pull your foot back toward you as far as possible, stretching the calf muscles, and hold that position for 5 seconds.                  · Repeat with the other foot.  · Perform 10 repetitions every hour while awake for both ankles if possible (down and then up with the foot once is one repetition). You should feel gentle stretching of the muscles in your lower leg when doing this exercise. If you feel pain, or your range of motion is limited, don't push too hard. Only go the limit your joint and muscles will let you go. If you have increasing pain, progressively worsening leg warmth or swelling, STOP the exercise and call your doctor. MEDICATION AND   SIDE EFFECT GUIDE    The [facility] MEDICATION AND SIDE EFFECT GUIDE was provided to the PATIENT AND CARE PROVIDER. Information provided includes instruction about drug purpose and common side effects for the following medications:   · Norco        These are general instructions for a healthy lifestyle:    *   Please give a list of your current medications to your Primary Care Provider. *   Please update this list whenever your medications are discontinued, doses are changed, or new medications (including over-the-counter products) are added. *   Please carry medication information at all times in case of emergency situations. About Smoking  No smoking / No tobacco products  Avoid exposure to second hand smoke     Surgeon General's Warning:  Quitting smoking now greatly reduces serious risk to your health. Obesity, smoking, and sedentary lifestyle greatly increases your risk for illness and disease. A healthy diet, regular physical exercise & weight monitoring are important for maintaining a healthy lifestyle. Congestive Heart Failure  You may be retaining fluid if you have a history of heart failure or if you experience any of the following symptoms:  Weight gain of 3 pounds or more overnight or 5 pounds in a week, increased swelling in your hands or feet or shortness of breath while lying flat in bed.   Please call your doctor as soon as you notice any of these symptoms; do not wait until your next office visit. Recognize signs and symptoms of STROKE:  F -  Face looks uneven  A -  Arms unable to move or move evenly  S -  Speech slurred or non-existent  T -  Time-call 911 as soon as signs and symptoms begin-DO NOT go          back to bed or wait to see if you get better-TIME IS BRAIN. Warning Signs of HEART ATTACK   Call 911 if you have these symptoms:     Chest discomfort. Most heart attacks involve discomfort in the center of the chest that lasts more than a few minutes, or that goes away and comes back. It can feel like uncomfortable pressure, squeezing, fullness, or pain.  Discomfort in other areas of the upper body. Symptoms can include pain or discomfort in one or both arms, the back, neck, jaw, or stomach.  Shortness of breath with or without chest discomfort.  Other signs may include breaking out in a cold sweat, nausea, or lightheadedness. Don't wait more than five minutes to call 911 - MINUTES MATTER! Fast action can save your life. Calling 911 is almost always the fastest way to get lifesaving treatment. Emergency Medical Services staff can begin treatment when they arrive -- up to an hour sooner than if someone gets to the hospital by car. Learning About Coronavirus [phone])  Coronavirus [phone]): Overview  What is coronavirus (COVID-19)? The coronavirus disease (COVID-19) is caused by a virus. It is an illness that was first found in [place], [place], in December 2019. It has since spread worldwide. The virus can cause fever, cough, and trouble breathing. In severe cases, it can cause pneumonia and make it hard to breathe without help. It can cause death. Coronaviruses are a large group of viruses. They cause the common cold. They also cause more serious illnesses like Middle East respiratory syndrome (MERS) and severe acute respiratory syndrome (SARS). COVID-19 is caused by a novel coronavirus.  That means it's a new type that has not been seen in people before. This virus spreads person-to-person through droplets from coughing and sneezing. It can also spread when you are close to someone who is infected. And it can spread when you touch something that has the virus on it, such as a doorknob or a tabletop. What can you do to protect yourself from coronavirus (COVID-19)? The best way to protect yourself from getting sick is to:  · Avoid areas where there is an outbreak. · Avoid contact with people who may be infected. · Wash your hands often with soap or alcohol-based hand sanitizers. · Avoid crowds and try to stay at least 6 feet away from other people. · Wash your hands often, especially after you cough or sneeze. Use soap and water, and scrub for at least 20 seconds. If soap and water aren't available, use an alcohol-based hand . · Avoid touching your mouth, nose, and eyes. What can you do to avoid spreading the virus to others? To help avoid spreading the virus to others:  · Cover your mouth with a tissue when you cough or sneeze. Then throw the tissue in the trash. · Use a disinfectant to clean things that you touch often. · Stay home if you are sick or have been exposed to the virus. Don't go to school, work, or public areas. And don't use public transportation. · If you are sick:  ? Leave your home only if you need to get medical care. But call the doctor's office first so they know you're coming. And wear a face mask, if you have one.  ? If you have a face mask, wear it whenever you're around other people. It can help stop the spread of the virus when you cough or sneeze. ? Clean and disinfect your home every day. Use household  and disinfectant wipes or sprays. Take special care to clean things that you grab with your hands. These include doorknobs, remote controls, phones, and handles on your refrigerator and microwave. And don't forget countertops, tabletops, bathrooms, and computer keyboards.   When to call for help  Call 911 anytime you think you may need emergency care. For example, call if:  · You have severe trouble breathing. (You can't talk at all.)  · You have constant chest pain or pressure. · You are severely dizzy or lightheaded. · You are confused or can't think clearly. · Your face and lips have a blue color. · You pass out (lose consciousness) or are very hard to wake up. Call your doctor now if you develop symptoms such as:  · Shortness of breath. · Fever. · Cough. If you need to get care, call ahead to the doctor's office for instructions before you go. Make sure you wear a face mask, if you have one, to prevent exposing other people to the virus. Where can you get the latest information? The following health organizations are tracking and studying this virus. Their websites contain the most up-to-date information. Jyotsna Emmanuel also learn what to do if you think you may have been exposed to the virus. · U.S. Centers for Disease Control and Prevention (CDC): The CDC provides updated news about the disease and travel advice. The website also tells you how to prevent the spread of infection. www.cdc.gov  · World Health Organization [facility]): WHO offers information about the virus outbreaks. WHO also has travel advice. www.who.int  Current as of: April 1, 2020               Content Version: 12.4  © 1905-0246 [employer]. Care instructions adapted under license by your healthcare professional. If you have questions about a medical condition or this instruction, always ask your healthcare professional. [place] [address] any warranty or liability for your use of this information. The discharge information has been reviewed with the spouse. Any questions and concerns from the spouse have been addressed. The spouse verbalized understanding.         CONTENTS FOUND IN YOUR DISCHARGE ENVELOPE:  [x]     Surgeon and Hospital Discharge Instructions  [x]     [facility] Care Provider Card  []     Medication & Side Effect Guide            (your newly prescribed medications have been marked/highlighted showing the most common side effects from   the classes of drugs on your prescriptions)  []     Medication Prescription(s) x Norco ( [x] These have been sent electronically to your pharmacy by your surgeon,   - OR -       your surgeon has already provided these to you during a previous/pre-op office visit)  []     [address]  []     Physical Therapy Prescription  []     Follow-up Appointment Cards  []     Surgery-related Pictures/Media  []     Pain block and/or block with On-Q Catheter from Anesthesia Service (information included in your instructions above)  []     Medical device information sheets/pamphlets from their    []     School/work excuse note. []     /parent work excuse note. The following personal items collected during your admission are returned to you:   Dental Appliance: Dental Appliances: Lowers, Partials  Vision:    Hearing Aid:    Jewelry:    Clothing: Clothing: Footwear, Pants, Shirt, Undergarments  Other Valuables:  Other Valuables: Eyeglasses  Valuables sent to safe:

## 2021-06-29 NOTE — ANESTHESIA PREPROCEDURE EVALUATION
Relevant Problems   PERSONAL HX & FAMILY HX OF CANCER   (+) Bilateral malignant neoplasm of breast in female, estrogen receptor positive (HCC)       Anesthetic History   No history of anesthetic complications            Review of Systems / Medical History  Patient summary reviewed, nursing notes reviewed and pertinent labs reviewed    Pulmonary            Asthma        Neuro/Psych   Within defined limits           Cardiovascular    Hypertension              Exercise tolerance: >4 METS     GI/Hepatic/Renal     GERD           Endo/Other        Obesity and cancer     Other Findings   Comments: Breast ca  covid-19 infection 2/21           Physical Exam    Airway  Mallampati: II    Neck ROM: normal range of motion   Mouth opening: Normal     Cardiovascular  Regular rate and rhythm,  S1 and S2 normal,  no murmur, click, rub, or gallop  Rhythm: regular  Rate: normal         Dental  No notable dental hx       Pulmonary  Breath sounds clear to auscultation               Abdominal  GI exam deferred       Other Findings            Anesthetic Plan    ASA: 3  Anesthesia type: general          Induction: Intravenous  Anesthetic plan and risks discussed with: Patient

## 2021-06-29 NOTE — OP NOTES
[facility]  OPERATIVE REPORT    Name:  José Luis Marcano  MR#:  [id number]  :  1960  ACCOUNT #:  [de-identified]  DATE OF SERVICE:  2021    PREOPERATIVE DIAGNOSIS:  Bilateral carcinoma of the breast.    POSTOPERATIVE DIAGNOSIS:  Bilateral carcinoma of the breast.    PROCEDURE PERFORMED:  1. Right lumpectomy with intraoperative ultrasound. 2.  Right sentinel lymph node biopsy. 3.  Left lumpectomy with bracketed needle localization. 4.  Left sentinel lymph node biopsy. SURGEON:  Roxie Hendrix MD    ASSISTANT:  uRss Gutierrez    ANESTHESIA:  General.    COMPLICATIONS:  None. SPECIMENS REMOVED:  Bilateral breast masses with margins and bilateral axillary sentinel lymph nodes. IMPLANTS:  None. ESTIMATED BLOOD LOSS:  Minimal.    INDICATIONS:  The patient is a 51-year-old female with bilateral breast carcinomas. PROCEDURE:  After lymphoscintigraphy in Radiology and needle localization in Radiology, the patient was brought to the operating room. After satisfactory induction of general endotracheal anesthesia, she was prepped and draped in sterile fashion. Attention was turned to the right side where an axillary incision was made and deepened through subcutaneous tissue with the Bovie cautery. Utilizing Neoprobe, two sentinel nodes were found in the deep axilla. They were removed and sent for permanent section. All dissection planes were hemostatic. The wound was anesthetized with 0.5% Marcaine, closed with interrupted 3-0 Vicryl and running subcuticular 4-0 Monocryl on the skin. Attention was then turned to the breast where an intraoperative ultrasound was performed and the breast was marked. A curvilinear incision was made in the upper outer quadrant and deepened through subcutaneous tissue with the Bovie cautery. A standard lumpectomy was then performed. Specimens were then removed and oriented.   A specimen ultrasound was performed which revealed the presence of a clip and the mass within the specimen. Additional distal deep margin was obtained and also oriented and sent for permanent section. All dissection planes were hemostatic. The wound was anesthetized with 0.5% Marcaine. Tissue advancement flaps were created over a distance of 4 x 3 cm superiorly and 4 x 2 cm inferiorly for a total tissue advancement of approximately 20 cm2. Tissue flaps were then closed with interrupted 3-0 Vicryl suture. Separate incisions were made inferiorly in the flap which were advanced into the wound. Tissue was reapproximated with interrupted 3-0 Vicryl suture and the skin was closed with running subcuticular 4-0 Monocryl. Attention was then turned to the left side where an axillary incision was made and deepened through subcutaneous tissue with the Bovie cautery. The axilla was entered, and utilizing the Neoprobe, two sentinel nodes were identified in the deep axilla. They were removed and sent for permanent section. All dissection planes were hemostatic. The wound was anesthetized with 0.5% Marcaine, closed with interrupted 3-0 Vicryl and a running subcuticular 4-0 Monocryl on the skin. Attention was then turned to the breast where a curvilinear incision was made in the upper outer quadrant between both localization wires. The wires were brought into the wound. A standard lumpectomy was then performed down to chest wall. Additional inferior margin was obtained. All specimens were oriented and sent to Pathology. Specimen radiograph was obtained prior to this of the lumpectomy revealing both wires and both clips within the specimen. All dissection planes were hemostatic. Tissue advancement flaps were then created over a distance of 7 x 5 cm inferior medially and 8 x 4 cm superior laterally for a total tissue advancement of approximately 67 cm2. Parenchymal flaps were closed with interrupted 3-0 Vicryl suture.   Additional incisions were made in the flaps to advance tissue further. The deep flaps were reapproximated with interrupted 3-0 Vicryl, subcutaneous tissue reapproximated with interrupted 3-0 Vicryl, and skin closed with running subcuticular 4-0 Monocryl. The patient tolerated the procedure well. There were no immediate complications. She was taken to the recovery room in stable condition.       Charmaine Cardona MD      [id number]  D:  06/29/2021 14:46  T:  06/29/2021 15:57  JOB #:  [id number]  CC:  Fani Lopez MD

## 2021-06-29 NOTE — PERIOP NOTES
POST ANESTHESIA CARE    DISCHARGE / TRANSFER NOTE    Charli Garibay was   discharged    via    wheel chair     to     private vehicle    . Patient was escorted by   nurse     . Patient verbalized   appreciation and was very pleased with care received  throughout their stay. Patient was discharged in     pleasant mood . Pain at discharge/transfer was      2 /10. Discharge, medication and follow-up instructions were verbalized as understood prior to discharge  (if applicable for same-day procedures being discharged.)    All personal belongings have been returned to patient, and patient/family verbally confirm receiving belongings as all present.     Signed: Barbara BENAVIDESN RN-BC

## 2021-06-29 NOTE — H&P
HISTORY OF PRESENT ILLNESS  Hannah Soler is a 61 y.o. female. HPI  ESTABLISHED patient here to discuss next steps in breast cancer treatment. She now has bilateral breast cancer.     Her LEFT breast is tender s/p MRI biopsies x 2.      05/24/21: RIGHT breast bx, 9:00. PATH: IDC, 0.7cm, histologic grade 1-2, ER+(100%)/ID+(95%)/HER2-, Ki-67 <5%. Clinical stage 1.     06/16/21: LEFT breast MRI-guided biopsies. PATH:  · Site A, posterior: Invasive and in situ mammary carcinoma, favor lobular, 6mm, overall grade 1, ER+(99%)/ID+(60%)/HER2-, Ki-67 19%. · Site B, anterior: Invasive and in situ mammary carcinoma, favor lobular, 11mm, overall grade 1, ER+(99%)/ID-, HER2 equivocal, FISH pending, Ki-67 18%. MammaPrint pending. · Clinical stage 1.     MammaPrint and FISH pending for Site B.     Breast imaging-  MRI Results (most recent):  Results from Hospital Encounter encounter on 06/16/21     MRI BX BREAST VAC LT ADDL W/CLIP AND SPECIMEN     Narrative  INDICATION: Newly diagnosed right breast cancer. 2 suspicious areas of  enhancement in the left breast on breast MRI.     COMPARISON: June 8, 2021 breast MRI     TECHNIQUE:  The risks and benefits of the procedure were discussed with the patient. Written  consent was obtained. Preliminary T1-weighted sagittal MR imaging of the left  breast was performed before and after the intravenous administration of 7.5 mL  Gadavist.     Site A/posterior: The mass in the left breast at the 2:00 posterior position was  targeted. The breast was prepped with ChloraPrep. 1% lidocaine was injected  locally in the skin. Lidocaine with epinephrine was injected into the deep  tissues of the breast. A 9-gauge Suros introducer needle was then advanced into  the breast using an introducer grid. MR imaging of the breast was then repeated. The images demonstrated accurate positioning of the introducer. A 9-gauge Suros  vacuum assisted biopsy needle was then inserted through the introducer.  6 core  samples were obtained. Post procedure MR imaging demonstrated accurate sampling  of the targeted lesion. A biopsy clip was deployed at the biopsy site. The  introducer was then removed.     Site B/anterior: The focal nonmass-like enhancement in the left breast at the  1-2 o'clock position, middle coronal third was targeted. The breast was prepped  with ChloraPrep. 1% lidocaine was injected locally in the skin. Lidocaine with  epinephrine was injected into the deep tissues of the breast. A 9-gauge Suros  introducer needle was then advanced into the breast using an introducer grid. MR  imaging of the breast was then repeated. The images demonstrated accurate  positioning of the introducer. A 9-gauge Suros vacuum assisted biopsy needle was  then inserted through the introducer. 6 core samples were obtained. Post  procedure MR imaging demonstrated accurate sampling of the targeted lesion. A  biopsy clip was deployed at the biopsy site. The introducer was then removed.     The patient tolerated the procedure well. There were no immediate complications. Pressure was applied locally on the breast until hemostasis was achieved. The  breast was dressed appropriately.     Postprocedure left unilateral digital mammography demonstrates accurate  positioning of both clips and no postbiopsy complication.     Impression  Successful MR guided biopsy of 2 left breast lesions. Biopsy clips  are appropriately positioned.  Pathology results are pending.             Past Medical History:   Diagnosis Date    Asthma      Hyperlipemia      Hypertension                 Past Surgical History:   Procedure Laterality Date    DE ABDOMEN SURGERY PROC UNLISTED             Social History            Socioeconomic History    Marital status:        Spouse name: Not on file    Number of children: Not on file    Years of education: Not on file    Highest education level: Not on file   Occupational History    Not on file   Tobacco Use    Smoking status: Never Smoker    Smokeless tobacco: Never Used   Substance and Sexual Activity    Alcohol use: No    Drug use: No    Sexual activity: Not on file   Other Topics Concern    Not on file   Social History Narrative    Not on file      Social Determinants of Health          Financial Resource Strain:     Difficulty of Paying Living Expenses:    Food Insecurity:     Worried About Running Out of Food in the Last Year:     [address] in the Last Year:    Transportation Needs:     Lack of Transportation (Medical):  Lack of Transportation (Non-Medical):    Physical Activity:     Days of Exercise per Week:     Minutes of Exercise per Session:    Stress:     Feeling of Stress :    Social Connections:     Frequency of Communication with Friends and Family:     Frequency of Social Gatherings with Friends and Family:     Attends Buddhist Services:     Active Member of Clubs or Organizations:     Attends Club or Organization Meetings:     Marital Status:    Intimate Partner Violence:     Fear of Current or Ex-Partner:     Emotionally Abused:     Physically Abused:     Sexually Abused:                 Current Outpatient Medications on File Prior to Visit   Medication Sig Dispense Refill    ALPRAZolam (XANAX) 0.25 mg tablet Take 1 Tablet by mouth as needed for Anxiety (1-2 tabs PO 1 hour prior to the procedure). Max Daily Amount: 2 mg. 5 Tablet 0    celecoxib (CELEBREX) 200 mg capsule TAKE 1 CAPSULE BY MOUTH ONCE DAILY WITH FOOD FOR 30 DAYS        LISINOPRIL-HYDROCHLOROTHIAZIDE PO Take  by mouth.        rosuvastatin (CRESTOR) 20 mg tablet Take 20 mg by mouth nightly.        traMADoL (ULTRAM) 50 mg tablet Take 50 mg by mouth every six (6) hours as needed for Pain.          No current facility-administered medications on file prior to visit.         No Known Allergies     OB History    No obstetric history on file.       Obstetric Comments   Menarche:  9. LMP: 2014.   # of Children:  4. Age at Delivery of First Child:  13.   Hysterectomy/oophorectomy:  NO/?.  Breast Bx:  no.  Hx of Breast Feeding:  ? Josseline Heman BCP:  ? . Hormone therapy:  no.                       ROS     Physical Exam  Exam conducted with a chaperone present. Cardiovascular:      Rate and Rhythm: Normal rate and regular rhythm. Heart sounds: Normal heart sounds. Pulmonary:      Breath sounds: Normal breath sounds. Chest:      Breasts: Breasts are symmetrical.         Right: Normal. No swelling, bleeding, inverted nipple, mass, nipple discharge, skin change or tenderness. Left: Normal. No swelling, bleeding, inverted nipple, mass, nipple discharge, skin change or tenderness. Lymphadenopathy:      Cervical:      Right cervical: No superficial, deep or posterior cervical adenopathy. Left cervical: No superficial, deep or posterior cervical adenopathy. Upper Body:      Right upper body: No supraclavicular or axillary adenopathy. Left upper body: No supraclavicular or axillary adenopathy.       BREAST ULTRASOUND, Pre-op Planning  Indication: Pre-op planning, BILATERAL breast cancer  Technique: The area was scanned using a high-frequency linear-array near-field transducer  Findings: One bx clip at 1:00, but cannot see the second clip  Impression: Breast cancer  Disposition: Surgery with bracketed wire loc        ASSESSMENT and PLAN      ICD-10-CM ICD-9-CM     1. Bilateral malignant neoplasm of breast in female, estrogen receptor positive, unspecified site of breast ([facility] [[email or web address] 75[facility]  C50.911 174. 9       Z17.0 V86.0       C50.912          Pt presents for consultation for treatment of BILATERAL breast cancer, clinical stage 1 bilaterally. LEFT breast US definitively visualizes one bx clip at 1:00, but cannot see the second clip; will plan for bracketed wire loc if pt opts for lumpectomy. Previous RIGHT breast US visualized 7mm irregular mass at 9:00.     We had a long discussion of options for treatment. The patient was accompanied by her  during this encounter, and over half the time was spent on counseling and coordination of care. We discussed in depth the pathology results, and the need for treatment. The goals of treatment are to treat the breast, and to reduce risk of local or distant recurrence.     Discussed treatment options with risks, complications, benefits, and limitations, including lumpectomy with XRT, and mastectomy with optional reconstruction, both having the same cure rate. LEFT breast masses are close enough together to be removed with a single lumpectomy with bracketed wire loc. Explained the importance of negative margins, and the need for re-excision in the case of positive margins. Also discussed benefit and technique of SLNBx of 3-4 LNs.    We also covered risk reduction strategies including XRT, chemotherapy, and hormonal therapy with estrogen blocker. Awaiting HER2/FISH results for one site on the LEFT. If HER2+, chemo will be recommended. If HER2-, will order MammaPrint to help determine whether pt will benefit from chemotherapy. If low risk with negative LNs, chemo may not be recommended. If high risk, will further evaluate need for chemo based on final tumor size and LN involvement. XRT will begin approximately 4 weeks after surgery or chemo. XRT treatments will be 5 days/week, and will last for 4-6 weeks, depending on LN involvement. Discussed estrogen blocker for further risk reduction for ER+ disease. Side effects may include hot flashes and joint pain. Discussed risk of recurrence, and risk reduction with these treatments.     We also discussed the pts questions and concerns. This will be an outpatient procedure, with sutures under the skin, and waterproof glue over the incision. Pt may swim in her pool with the waterproof glue in place. She is advised to apply sunscreen to incisions before any tanning.  Also reviewed med list; pt may continue her current medications, except as advised by PAT. Pt should avoid heavy lifting for 7-10 days following surgery, and should stay home from work for 2 weeks.     Pt has elected to proceed with BL lumpectomy and SLNBx. She notes preference to have all treatments at [facility]. Pt understands and consents to surgery. Will schedule BL lumpectomy and SLNBx for the near future at [facility], and scheduling will f/u with the date. This plan was reviewed with the patient and patient agrees. All questions were answered.

## 2021-06-29 NOTE — ANESTHESIA POSTPROCEDURE EVALUATION
Procedure(s):  LEFT BREAST LUMPECTOMY WITH BRACKETED NEEDLE LOCALIZATION, LEFT BREAST SENTINEL NODE BIOPSY, RIGHT BREAST LUMPECTOMY WITH ULTRASOUND, RIGHT BREAST SENTINEL NODE BIOPSY. general    Anesthesia Post Evaluation      Multimodal analgesia: multimodal analgesia not used between 6 hours prior to anesthesia start to PACU discharge  Patient location during evaluation: PACU  Patient participation: complete - patient participated  Level of consciousness: awake and alert  Pain score: 0 (Patient has headache)  Pain management: adequate  Airway patency: patent  Anesthetic complications: no  Cardiovascular status: hemodynamically stable and acceptable (HR now 100)  Respiratory status: acceptable  Hydration status: acceptable  Comments: Patient seen and evaluated; no concerns. Post anesthesia nausea and vomiting:  none      INITIAL Post-op Vital signs:   Vitals Value Taken Time   /85 06/29/21 1715   Temp 36.7 °C (98.1 °F) 06/29/21 1506   Pulse 101 06/29/21 1716   Resp 13 06/29/21 1716   SpO2 96 % 06/29/21 1714   Vitals shown include unvalidated device data.

## 2021-06-29 NOTE — PERIOP NOTES
Note intraop IVF ~1200, -125 persists despite 500 bolus LR. MDA informed and will see. [facility]  Dr Siddharth Mendez here to see. Pt states she probably could void. Will dress and give BRP and reevaluate. If remains tachy then give remainder of IVF. 1630  Post void remains tachy rate unchanged 115's. Sitting in w/c to infuse remainder. 1700  Rate now in low 100's - Dr Sen Saeed here to reevaluate.

## 2021-06-29 NOTE — BRIEF OP NOTE
Brief Postoperative Note    Patient: Shady Kevin  YOB: 1960  MRN: [id number]    Date of Procedure: 6/29/2021     Pre-Op Diagnosis: BILATERAL BREAST CANCER    Post-Op Diagnosis: Same as preoperative diagnosis. Procedure(s):  LEFT BREAST LUMPECTOMY WITH BRACKETED NEEDLE LOCALIZATION, LEFT BREAST SENTINEL NODE BIOPSY, RIGHT BREAST LUMPECTOMY WITH ULTRASOUND, RIGHT BREAST SENTINEL NODE BIOPSY  .     Surgeon(s):  Rehana Huertas MD    Surgical Assistant: Surg Asst-1: Troy Schrader RN    Anesthesia: General     Estimated Blood Loss (mL): Minimal    Complications: None    Specimens:   ID Type Source Tests Collected by Time Destination   1 : right axillary sentinel node #1 Preservative Axilla  Rehana Huertas MD 6/29/2021 1320 Pathology   2 : right axliiary sentinel node #2 Preservative Axilla  Lita Huang MD 6/29/2021 1356 Pathology   3 : right axillary sentinel node #3 Preservative Axilla  Lita Huang MD 6/29/2021 1357 Pathology   4 : right breast lumpectomy Preservative Breast  Rehana Huertas MD 6/29/2021 1358 Pathology   5 : right breast deep margin  Preservative Breast  Rehana Huertas MD 6/29/2021 1359 Pathology   6 : left axillary sentinel node #1 Preservative Axilla  Rehana Huertas MD 6/29/2021 1400 Pathology   7 : left axillary sentinel node #2 Preservative Axilla  Rehana Huertas MD 6/29/2021 1401 Pathology   8 : left breast lumpectomy Preservative Breast  Rehana Huertas MD 6/29/2021 1421 Pathology   9 : left breast inferior margin stitch inner anterior Preservative Breast  Rehana Huertas MD 6/29/2021 1428 Pathology        Implants: * No implants in log *    Drains: * No LDAs found *    Findings: sent nodes bilateral x 2, spec sono on right pos mass, tissue advancement 20 sq cm, spec radiograph on left pos clips x 2, tissue advancement 67 sq cm    Electronically Signed by Jake Fernández MD on 6/29/2021 at 2:41 PM

## 2021-07-06 ENCOUNTER — DOCUMENTATION ONLY (OUTPATIENT)
Dept: SURGERY | Age: 61
End: 2021-07-06

## 2021-07-06 NOTE — PROGRESS NOTES
Received Disability forms form [employer]. Patient paid 25.00 fee. Receipt scan in cc. Receipt and forms will be scanned to [facility].

## 2021-07-07 ENCOUNTER — TELEPHONE (OUTPATIENT)
Dept: SURGERY | Age: 61
End: 2021-07-07

## 2021-07-08 ENCOUNTER — TELEPHONE (OUTPATIENT)
Dept: SURGERY | Age: 61
End: 2021-07-08

## 2021-07-08 DIAGNOSIS — C50.911 MALIGNANT NEOPLASM OF RIGHT FEMALE BREAST, UNSPECIFIED ESTROGEN RECEPTOR STATUS, UNSPECIFIED SITE OF BREAST (HCC): Primary | ICD-10-CM

## 2021-07-08 DIAGNOSIS — C50.912 MALIGNANT NEOPLASM OF LEFT FEMALE BREAST, UNSPECIFIED ESTROGEN RECEPTOR STATUS, UNSPECIFIED SITE OF BREAST (HCC): Primary | ICD-10-CM

## 2021-07-08 NOTE — TELEPHONE ENCOUNTER
Completed fmla ppw and faxed to [facility]. Notified patient I had done this. Copy made to be scanned into chart. Patient has post op appt on 7.12.21 and I will give her copies of it as well. Patient asking about appts for rad onc and med onc. i'll route to Maddy Aviles.

## 2021-07-12 ENCOUNTER — OFFICE VISIT (OUTPATIENT)
Dept: SURGERY | Age: 61
End: 2021-07-12
Payer: COMMERCIAL

## 2021-07-12 VITALS
SYSTOLIC BLOOD PRESSURE: 130 MMHG | HEART RATE: 83 BPM | HEIGHT: 60 IN | BODY MASS INDEX: 35.73 KG/M2 | DIASTOLIC BLOOD PRESSURE: 86 MMHG | WEIGHT: 182 LBS

## 2021-07-12 DIAGNOSIS — C50.911 BILATERAL MALIGNANT NEOPLASM OF BREAST IN FEMALE, ESTROGEN RECEPTOR POSITIVE, UNSPECIFIED SITE OF BREAST (HCC): ICD-10-CM

## 2021-07-12 DIAGNOSIS — Z17.0 BILATERAL MALIGNANT NEOPLASM OF BREAST IN FEMALE, ESTROGEN RECEPTOR POSITIVE, UNSPECIFIED SITE OF BREAST (HCC): ICD-10-CM

## 2021-07-12 DIAGNOSIS — C50.912 BILATERAL MALIGNANT NEOPLASM OF BREAST IN FEMALE, ESTROGEN RECEPTOR POSITIVE, UNSPECIFIED SITE OF BREAST (HCC): ICD-10-CM

## 2021-07-12 PROCEDURE — 99024 POSTOP FOLLOW-UP VISIT: CPT | Performed by: SURGERY

## 2021-07-12 NOTE — PROGRESS NOTES
HISTORY OF PRESENT ILLNESS  Manish Child is a 61 y.o. female. HPI  ESTABLISHED patient here for post op visit s/p bilateral breast lumpectomies and bilateral SLNB. She is having some pain in the LEFT breast and feels \"pulling\" in RIGHT breast.     05/24/21: RIGHT breast bx, 9:00. PATH: IDC, 0.7cm, histologic grade 1-2, ER+(100%)/NM+(95%)/HER2-, Ki-67 <5%. Clinical stage 1.     06/16/21: LEFT breast MRI-guided biopsies. PATH:  · Site A, posterior: Invasive and in situ mammary carcinoma, favor lobular, 6mm, overall grade 1, ER+(99%)/NM+(60%)/HER2-, Ki-67 19%. · Site B, anterior: Invasive and in situ mammary carcinoma, favor lobular, 11mm, overall grade 1, ER+(99%)/NM-, HER2- by FISH, Ki-67 18%. MammaPrint: Low risk, luminal type A, +0.309. · Clinical stage 1.     06/29/21: BL lumpectomies and BL SLNBx. PATH:  · RIGHT: IDC, 25 x 10 x 10, overall grade 1, negative margins, 0/7 LNs invovled. DCIS, 2 x 1mm, negative margins. Fibroadenoma. Pathologic stage: pT2, pN0. MammaPrint pending. · LEFT: Multifocal ILC (x2), 18 x 15 x 10mm and 15 x 14 x 10, overall grade 1, negative margins, 0/8 LNs involved. LCIS.  Pathologic stage: pT1c, pN0.     MammaPrint pending for RIGHT breast.      Past Medical History:   Diagnosis Date    Asthma     Cancer ([facility] [address] 06/2021    bilateral breast cancer    COVID-19 02/2021    GERD (gastroesophageal reflux disease)     Hyperlipemia     Hypertension        Past Surgical History:   Procedure Laterality Date    HX BREAST LUMPECTOMY Bilateral 6/29/2021    LEFT BREAST LUMPECTOMY WITH BRACKETED NEEDLE LOCALIZATION, LEFT BREAST SENTINEL NODE BIOPSY, RIGHT BREAST LUMPECTOMY WITH ULTRASOUND, RIGHT BREAST SENTINEL NODE BIOPSY performed by Rosita Walton MD at [address] [facility] Left 2016    parital left knee arthroplasty    NM ABDOMEN SURGERY PROC UNLISTED  2006    lauren nissen       Social History     Socioeconomic History    Marital status:      Spouse name: Not on file    Number of children: Not on file    Years of education: Not on file    Highest education level: Not on file   Occupational History    Not on file   Tobacco Use    Smoking status: Never Smoker    Smokeless tobacco: Never Used   Vaping Use    Vaping Use: Never used   Substance and Sexual Activity    Alcohol use: Not Currently     Comment:  2 drinks per year    Drug use: No    Sexual activity: Not on file   Other Topics Concern    Not on file   Social History Narrative    Not on file     Social Determinants of Health     Financial Resource Strain:     Difficulty of Paying Living Expenses:    Food Insecurity:     Worried About Running Out of Food in the Last Year:     [address] in the Last Year:    Transportation Needs:     Lack of Transportation (Medical):  Lack of Transportation (Non-Medical):    Physical Activity:     Days of Exercise per Week:     Minutes of Exercise per Session:    Stress:     Feeling of Stress :    Social Connections:     Frequency of Communication with Friends and Family:     Frequency of Social Gatherings with Friends and Family:     Attends Islam Services:     Active Member of Clubs or Organizations:     Attends Club or Organization Meetings:     Marital Status:    Intimate Partner Violence:     Fear of Current or Ex-Partner:     Emotionally Abused:     Physically Abused:     Sexually Abused:        Current Outpatient Medications on File Prior to Visit   Medication Sig Dispense Refill    rosuvastatin (CRESTOR) 20 mg tablet Take 20 mg by mouth daily.  cholecalciferol, vitamin D3, (Vitamin D3) 50 mcg (2,000 unit) tab Take 1 Tablet by mouth daily.  omeprazole (PRILOSEC) 20 mg capsule Take 20 mg by mouth daily.  celecoxib (CELEBREX) 200 mg capsule TAKE 1 CAPSULE BY MOUTH ONCE DAILY WITH FOOD FOR 30 DAYS      lisinopril-hydroCHLOROthiazide (PRINZIDE, ZESTORETIC) 20-25 mg per tablet Take 1 Tablet by mouth daily.       traMADoL (ULTRAM) 50 mg tablet Take 50 mg by mouth every six (6) hours as needed for Pain. No current facility-administered medications on file prior to visit. No Known Allergies    OB History    No obstetric history on file. Obstetric Comments   Menarche:  5. LMP: 2014. # of Children:  4. Age at Delivery of First Child:  13.   Hysterectomy/oophorectomy:  NO/?.  Breast Bx:  no.  Hx of Breast Feeding:  ? Mario Decent BCP:  ? . Hormone therapy:  no.                   ROS    Physical Exam  Exam conducted with a chaperone present. Cardiovascular:      Rate and Rhythm: Normal rate and regular rhythm. Heart sounds: Normal heart sounds. Pulmonary:      Breath sounds: Normal breath sounds. Chest:      Breasts: Breasts are symmetrical.         Right: Normal. No swelling, bleeding, inverted nipple, mass, nipple discharge, skin change or tenderness. Left: Normal. No swelling, bleeding, inverted nipple, mass, nipple discharge, skin change or tenderness. Lymphadenopathy:      Cervical:      Right cervical: No superficial, deep or posterior cervical adenopathy. Left cervical: No superficial, deep or posterior cervical adenopathy. Upper Body:      Right upper body: No supraclavicular or axillary adenopathy. Left upper body: No supraclavicular or axillary adenopathy. ASSESSMENT and PLAN    ICD-10-CM ICD-9-CM    1. Bilateral malignant neoplasm of breast in female, estrogen receptor positive, unspecified site of breast [facility]  C50.911 174.9     Z17.0 V86.0     C50.912        Patient presents for f/u s/p BL lumpectomies and BL SLNBx on 06/29/21, and is doing well overall. Well healed incisions bilaterally, no evidence of local recurrence. Reviewed low risk MammaPrint results on the LEFT. MammaPrint still pending on the RIGHT, will call with results when available    Pt may begin to resume her normal activities and swimming.  She may return to work in 3 weeks and may do heavy lifting there as usual. Also addressed questions about post-op immunity; pt may have changes in immunity with chemo, but is at normal immunity now after surgery. Pt to see medical oncology and radiation oncology for further consultation for adjuvant treatments. F/U in 6 months with Michelle De Oliveira NP. This plan was reviewed with the patient and patient agrees. All questions were answered.     Written by Neeta Swift, as dictated by Dr. Colten Scott MD.

## 2021-07-12 NOTE — PROGRESS NOTES
HISTORY OF PRESENT ILLNESS  Betty Jarquin is a 61 y.o. female. HPI ESTABLISHED patient here for post op visit s/p bilateral breast lumpectomies and bilateral SLNB. She is having some pain in the LEFT breast and feels \"pulling\" in RIGHT breast.    05/24/21: RIGHT breast bx, 9:00. PATH: IDC, 0.7cm, histologic grade 1-2, ER+(100%)/SD+(95%)/HER2-, Ki-67 <5%. Clinical stage 1.    06/16/21: LEFT breast MRI-guided biopsies. PATH:  - Site A, posterior: Invasive and in situ mammary carcinoma, favor lobular, 6mm, overall grade 1, ER+(99%)/SD+(60%)/HER2-, Ki-67 19%. - Site B, anterior: Invasive and in situ mammary carcinoma, favor lobular, 11mm, overall grade 1, ER+(99%)/SD-, HER2- by FISH, Ki-67 18%. MammaPrint: Low risk, luminal type A, +0.309.  - Clinical stage 1.    06/29/21: BL lumpectomies and BL SLNBx. PATH:  - RIGHT: IDC, 25 x 10 x 10, overall grade 1, negative margins, 0/7 LNs invovled. DCIS, 2 x 1mm, negative margins. Fibroadenoma. Pathologic stage: pT2, pN0. MammaPrint pending.  - LEFT: Multifocal ILC (x2), 18 x 15 x 10mm and 15 x 14 x 10, overall grade 1, negative margins, 0/8 LNs involved. LCIS. Pathologic stage: pT1c, pN0.     MammaPrint pending for RIGHT breast.    [facility]    Physical Exam    ASSESSMENT and PLAN  {ASSESSMENT/PLAN:[id number]}

## 2021-07-12 NOTE — PATIENT INSTRUCTIONS
Lumpectomy: What to Expect at Home  Your Recovery     Breast-conserving surgery (lumpectomy) removes the cancer and just enough tissue to get all the cancer. For 1 or 2 days after the surgery, you will probably feel tired and have some pain. The skin around the cut (incision) may feel firm, swollen, and tender, and be bruised. Tenderness should go away in about 2 or 3 days, and the bruising within 2 weeks. Firmness and swelling may last for 3 to 6 months. You may feel a soft lump in your breast that gradually turns hard. This is the incision healing. It is not cancer. Women should wear a well-fitted and supportive bra, even during the night, for 1 week. You will probably be able to go back to work or your normal routine in 1 to 3 weeks after the surgery. This may depend on whether you have more treatment. Your doctor may have removed some lymph nodes in your armpit to see if the cancer has spread. If so, you may feel either numbness or tingling (\"pins and needles\") in your armpit or on the inside of your upper arm. This should improve over the next several weeks. Some people have numbness for a longer time. When you find out that you have cancer, you may feel many emotions and may need some help coping. Seek out family, friends, and counselors for support. You also can do things at home to make yourself feel better while you go through treatment. Call the [employer] (7-725.410.5154) or visit its website at [address] [employer] for more information. This care sheet gives you a general idea about how long it will take for you to recover. But each person recovers at a different pace. Follow the steps below to get better as quickly as possible. How can you care for yourself at home? Activity    · Rest when you feel tired. Getting enough sleep will help you recover. You may want to sleep on the side that has not been operated on.  A woman may want to use a pillow to support the affected breast while lying on her side.     · Avoid strenuous activities, such as biking, jogging, weightlifting, or aerobic exercise, for 1 month or until your doctor says it is okay. This may include housework, such as washing windows, especially if you have to use the arm next to the affected breast.     · Most people can return to their normal activities within 2 weeks.     · Try to walk each day. Start out by walking a little more than you did the day before. Bit by bit, increase the amount you walk. Walking boosts blood flow and helps prevent pneumonia and constipation.     · For 1 to 2 weeks, avoid lifting anything over 10 to 15 pounds or that would make you strain. This may include heavy grocery bags and milk containers, a heavy briefcase or backpack, cat litter or dog food bags, a vacuum , or a child.     · You may drive when you are no longer taking pain medicine and can use your arm without pain. Talk to your doctor about when to start driving, especially if you are having radiation treatments.     · You will probably be able to go back to work or your normal routine in 1 to 3 weeks. It may be longer, depending on the type of work you do and whether you are having radiation or chemotherapy.     · You may shower 24 to 48 hours after surgery, if your doctor okays it. Pat the incision dry. Do not take a bath for the first 2 weeks, or until your doctor tells you it is okay. Diet    · You can eat your normal diet. If your stomach is upset, try bland, low-fat foods like plain rice, broiled chicken, toast, and yogurt.     · You may notice that your bowel movements are not regular right after your surgery. This is common. Try to avoid constipation and straining with bowel movements. You may want to take a fiber supplement every day. If you have not had a bowel movement after a couple of days, ask your doctor about taking a mild laxative. Medicines    · Your doctor will tell you if and when you can restart your medicines. He or she will also give you instructions about taking any new medicines.     · If you take aspirin or some other blood thinner, ask your doctor if and when to start taking it again. Make sure that you understand exactly what your doctor wants you to do.     · Take pain medicines exactly as directed. ? Your doctor may have given you a medicine to numb the area inside and around your cut (incision). The numbness will last from 6 to 12 hours. If you went home right after the surgery, you may want to take pain medicine before this wears off.  ? If the doctor gave you a prescription medicine for pain, take it as prescribed. ? If you are not taking a prescription pain medicine, ask your doctor if you can take an over-the-counter medicine.     · If your doctor prescribed antibiotics, take them as directed. Do not stop taking them just because you feel better. You need to take the full course of antibiotics.     · If you think your pain medicine is making you sick to your stomach:  ? Take your medicine after meals (unless your doctor has told you not to). ? Ask your doctor for a different pain medicine. Incision care    · If you have strips of tape on the cut the doctor made (incision), leave the tape on for a week or until it falls off.     · When you can shower, wash the area daily with warm, soapy water and pat it dry. Follow-up care is a key part of your treatment and safety. Be sure to make and go to all appointments, and call your doctor if you are having problems. It's also a good idea to know your test results and keep a list of the medicines you take. When should you call for help? Call 911 anytime you think you may need emergency care. For example, call if:    · You passed out (lost consciousness).     · You have chest pain, are short of breath, or cough up blood.    Call your doctor now or seek immediate medical care if:    · You are sick to your stomach or cannot drink fluids.     · You cannot pass stools or gas.     · You have pain that does not get better after you take your pain medicine.     · You have loose stitches, or your incision comes open.     · Bright red blood has soaked through the bandage over your incision.     · You have signs of a blood clot in your leg (called a deep vein thrombosis), such as:  ? Pain in your calf, back of the knee, thigh, or groin. ? Redness or swelling in your leg.     · You have signs of infection, such as:  ? Increased pain, swelling, warmth, or redness. ? Red streaks leading from the incision. ? Pus draining from the incision. ? A fever. Watch closely for changes in your health, and be sure to contact your doctor if:    · You have any problems.     · You have new or worse swelling or pain in your arm. Where can you learn more? Go to http://www.gray.com/  Enter D222 in the search box to learn more about \"Lumpectomy: What to Expect at Home. \"  Current as of: December 17, 2020               Content Version: 12.8  © 2006-2021 [employer]. Care instructions adapted under license by [employer] (which disclaims liability or warranty for this information). If you have questions about a medical condition or this instruction, always ask your healthcare professional. [address] any warranty or liability for your use of this information.

## 2021-07-19 ENCOUNTER — TELEPHONE (OUTPATIENT)
Dept: SURGERY | Age: 61
End: 2021-07-19

## 2021-07-20 ENCOUNTER — NURSE NAVIGATOR (OUTPATIENT)
Dept: CASE MANAGEMENT | Age: 61
End: 2021-07-20

## 2021-07-20 ENCOUNTER — DOCUMENTATION ONLY (OUTPATIENT)
Dept: ONCOLOGY | Age: 61
End: 2021-07-20

## 2021-07-20 ENCOUNTER — RESEARCH ENCOUNTER (OUTPATIENT)
Dept: ONCOLOGY | Age: 61
End: 2021-07-20

## 2021-07-20 ENCOUNTER — OFFICE VISIT (OUTPATIENT)
Dept: ONCOLOGY | Age: 61
End: 2021-07-20
Payer: COMMERCIAL

## 2021-07-20 VITALS
WEIGHT: 189.6 LBS | DIASTOLIC BLOOD PRESSURE: 72 MMHG | SYSTOLIC BLOOD PRESSURE: 121 MMHG | TEMPERATURE: 97.7 F | BODY MASS INDEX: 37.22 KG/M2 | OXYGEN SATURATION: 97 % | HEIGHT: 60 IN | RESPIRATION RATE: 22 BRPM | HEART RATE: 74 BPM

## 2021-07-20 DIAGNOSIS — C50.811 MALIGNANT NEOPLASM OF OVERLAPPING SITES OF BOTH BREASTS IN FEMALE, ESTROGEN RECEPTOR POSITIVE (HCC): Primary | ICD-10-CM

## 2021-07-20 DIAGNOSIS — C50.812 MALIGNANT NEOPLASM OF OVERLAPPING SITES OF BOTH BREASTS IN FEMALE, ESTROGEN RECEPTOR POSITIVE (HCC): Primary | ICD-10-CM

## 2021-07-20 DIAGNOSIS — Z17.0 MALIGNANT NEOPLASM OF OVERLAPPING SITES OF BOTH BREASTS IN FEMALE, ESTROGEN RECEPTOR POSITIVE (HCC): Primary | ICD-10-CM

## 2021-07-20 PROCEDURE — 99245 OFF/OP CONSLTJ NEW/EST HI 55: CPT | Performed by: INTERNAL MEDICINE

## 2021-07-20 RX ORDER — DEXAMETHASONE 4 MG/1
TABLET ORAL
Qty: 32 TABLET | Refills: 3 | Status: SHIPPED | OUTPATIENT
Start: 2021-07-20 | End: 2022-04-18

## 2021-07-20 RX ORDER — LIDOCAINE AND PRILOCAINE 25; 25 MG/G; MG/G
CREAM TOPICAL AS NEEDED
Qty: 30 G | Refills: 0 | Status: SHIPPED | OUTPATIENT
Start: 2021-07-20 | End: 2022-04-18

## 2021-07-20 RX ORDER — ONDANSETRON HYDROCHLORIDE 8 MG/1
8 TABLET, FILM COATED ORAL
Qty: 60 TABLET | Refills: 3 | Status: SHIPPED | OUTPATIENT
Start: 2021-07-20 | End: 2022-08-24

## 2021-07-20 RX ORDER — PROCHLORPERAZINE MALEATE 10 MG
10 TABLET ORAL
Qty: 50 TABLET | Refills: 5 | Status: SHIPPED | OUTPATIENT
Start: 2021-07-20

## 2021-07-20 NOTE — PROGRESS NOTES
Identified pt with two pt identifiers(name and ). Reviewed record in preparation for visit and have obtained necessary documentation. Chief Complaint   Patient presents with    Breast Cancer     NP- ref by Dr. Hui Room:    21 1629   BP: 121/72   Pulse: 74   Resp: 22   Temp: 97.7 °F (36.5 °C)   TempSrc: Oral   SpO2: 97%   Weight: 189 lb 9.6 oz (86 kg)   Height: 5' (1.524 m)   PainSc:   2   PainLoc: Breast       Pain Scale: 2/10    Coordination of Care Questionnaire:  :     1. Have you been to the ER, urgent care clinic since your last visit? Hospitalized since your last visit? No    2. Have you seen or consulted any other health care providers outside of the [address] [place] [address] since your last visit? Include any pap smears or colon screening.  No

## 2021-07-20 NOTE — PROGRESS NOTES
[facility] [place] at [address], [address] [facility] [address] [facility]  W: [phone]  F: [phone]      Reason for Visit:   Jerri Hernández is a 61 y.o. female who is seen in consultation at the request of Dr. Diana Blanco for evaluation of therapy for breast cancer    Rad onc:  Dr. Melly Vergara    Treatment History:   · 5/24/21 right breast mass core bx:  IDC, 7 mm, gr 1-2, no LVI, ER + at 100%, IN + at 95%, HER 2 negative at [facility] 1+, ki67 < 5%  · MRI bx, 6/16/21 breast left site A, posterior bx: ILC, gr 1, 6 mm, ER + at 99%, IN + at 60%, HER 2 negative at [facility] 1+, ki67 19%; left breast site B, anterior bx:  ILC, gr 1, 1.1 cm, ER + at 99%, IN negative, HER 2 negative ([facility] 2+; FISH ratio 1.1 ; sig/cell 2); ki67 18%  · 6/29/21 right breast lumpectomy:  IDC, 2.5 cm, gr 1, DCIS, gr 2, focal 2 mm, 0/7 LN, pT2 pN0 cM0; left breast lumpectomy:  Multifocal ILC, gr 1, 1.8 cm and 1.5 cm, 0/8 LN, pT1c pN0 cM0  · Mammaprint, right breast, high-risk, luminal type B  · Mammaprint, left breast, site B, low-risk, luminal type A    History of Present Illness:    An abnormal right mammogram led to the pathology above    FH:  Father with lung cancer; no breast; paternal aunt with ovarian cancer, no prostate or pancreas cancer    Past Medical History:   Diagnosis Date    Asthma     Cancer ([address] 06/2021    bilateral breast cancer    COVID-19 02/2021    GERD (gastroesophageal reflux disease)     Hyperlipemia     Hypertension       Past Surgical History:   Procedure Laterality Date    HX BREAST LUMPECTOMY Bilateral 6/29/2021    LEFT BREAST LUMPECTOMY WITH BRACKETED NEEDLE LOCALIZATION, LEFT BREAST SENTINEL NODE BIOPSY, RIGHT BREAST LUMPECTOMY WITH ULTRASOUND, RIGHT BREAST SENTINEL NODE BIOPSY performed by Joe Jarquin MD at [address] HX ORTHOPAEDIC Left 2016    parital left knee arthroplasty    IN ABDOMEN SURGERY PROC UNLISTED  2006    lap nissen      Social History     Tobacco Use    Smoking status: Never Smoker    Smokeless tobacco: Never Used   Substance Use Topics    Alcohol use: Not Currently     Comment:  2 drinks per year      Family History   Problem Relation Age of Onset    Lung Cancer Father      Current Outpatient Medications   Medication Sig    rosuvastatin (CRESTOR) 20 mg tablet Take 20 mg by mouth daily.  cholecalciferol, vitamin D3, (Vitamin D3) 50 mcg (2,000 unit) tab Take 1 Tablet by mouth daily.  omeprazole (PRILOSEC) 20 mg capsule Take 20 mg by mouth daily.  celecoxib (CELEBREX) 200 mg capsule TAKE 1 CAPSULE BY MOUTH ONCE DAILY WITH FOOD FOR 30 DAYS    lisinopril-hydroCHLOROthiazide (PRINZIDE, ZESTORETIC) 20-25 mg per tablet Take 1 Tablet by mouth daily.  traMADoL (ULTRAM) 50 mg tablet Take 50 mg by mouth every six (6) hours as needed for Pain. No current facility-administered medications for this visit. No Known Allergies     Review of Systems: A complete review of systems was obtained, negative except as described above. Physical Exam:   There were no vitals taken for this visit.   ECOG PS: 0    Constitutional: Appears well-developed and well-nourished in no apparent distress      Mental status: Alert and awake, Oriented to person/place/time, Able to follow commands    Eyes: EOM normal, Sclera normal, No visible ocular discharge    HENT: Normocephalic, atraumatic    Mouth/Throat: Moist mucous membranes    External Ears normal    Neck: No visualized mass    Pulmonary/Chest: Respiratory effort normal, No visualized signs of difficulty breathing or respiratory distress    Musculoskeletal: Normal gait with no signs of ataxia, Normal range of motion of neck    Neurological: No facial asymmetry (Cranial nerve 7 motor function), No gaze palsy    Skin: No significant exanthematous lesions or discoloration noted on facial skin    Psychiatric: Normal affect, No hallucinations               Results:     Lab Results   Component Value Date/Time    WBC 10.2 02/27/2020 07:08 PM    WBC 10.1 02/27/2020 07:08 PM    HGB 14.4 02/27/2020 07:08 PM    HGB 14.4 02/27/2020 07:08 PM    HCT 42.9 02/27/2020 07:08 PM    HCT 43.0 02/27/2020 07:08 PM    PLATELET 202 44/15/7720 07:08 PM    PLATELET 664 60/36/5157 07:08 PM    MCV 88.5 02/27/2020 07:08 PM    MCV 88.7 02/27/2020 07:08 PM    ABS. NEUTROPHILS 5.3 02/27/2020 07:08 PM    ABS. NEUTROPHILS 5.3 02/27/2020 07:08 PM     Lab Results   Component Value Date/Time    Sodium 140 06/25/2021 12:08 PM    Potassium 4.6 06/25/2021 12:08 PM    Chloride 108 06/25/2021 12:08 PM    CO2 25 06/25/2021 12:08 PM    Glucose 86 06/25/2021 12:08 PM    BUN 16 06/25/2021 12:08 PM    Creatinine 0.53 (L) 06/25/2021 12:08 PM    GFR est AA >60 06/25/2021 12:08 PM    GFR est non-AA >60 06/25/2021 12:08 PM    Calcium 9.4 06/25/2021 12:08 PM     Lab Results   Component Value Date/Time    Bilirubin, total 0.4 02/27/2020 07:08 PM    ALT (SGPT) 27 02/27/2020 07:08 PM    Alk. phosphatase 82 02/27/2020 07:08 PM    Protein, total 7.2 02/27/2020 07:08 PM    Albumin 4.2 02/27/2020 07:08 PM    Globulin 3.0 02/27/2020 07:08 PM     6/8/21 MRI breast  FINDINGS:      Background enhancement: Minimal.     Right Breast: At about the 8:00 position and middle depth of the right breast  there is a 5 x 6 x 8 mm enhancing nodule adjacent to the posterior aspect of  Hydro[employer] clip signal void. There are no other enhancing lesions to suggest any  additional sites of invasive breast carcinoma.     Left Breast: There is a 7 x 9 x 11 mm mass with washout kinetics in the 12:00  position of the central left breast near the junction of the posterior middle  thirds. Slightly superior to this at 12:00 at approximately the junction of the  anterior middle thirds there is a 6 x 9 x 9 mm enhancing focus with some minimal  washout kinetics.  There are no other spacious enhancing lesions identified.     Lymph: Unremarkable.     Chest wall and visualized abdomen: Unremarkable.     IMPRESSION  Known right breast carcinoma with no additional right breast lesions  identified. There are two suspicious enhancing masses left breast.     Right Breast:  BI-RADS Assessment Category 6: Known biopsy proven malignancy-  Appropriate action should be taken.     Left Breast:   BI-RADS Assessment Category 4: Suspicious abnormality - Biopsy  should be performed in the absence of clinical contraindication.     RECOMMENDATION: MRI guided left breast biopsy of two lesions. Records reviewed and summarized above. Pathology report(s) reviewed above. Radiology report(s) reviewed above. Assessment/plan:   1. Right breast LOQ IDC, 2.5 cm, 0/7 LN, gr 2, ER +, [place] +, HER 2 negative, stage IIA, prognostic stage IA  High risk mammaprint    We explained to the patient that the goal of systemic adjuvant therapy is to improve the chances for cure and decrease the risk of relapse. We explained why a patient can have microscopic cancer spread now even though physical examination, laboratory studies and imaging studies are negative for cancer. We explained that the same treatments used now as adjuvant or preventive treatments rarely if ever are curative in women who develop metastases. We discussed the potential value of Mammaprint testing. The [place] 70-gene prognostic profile (Mammaprint®), one of the first gene expression array-based prognosticators, classifies tumors as low-risk or high-risk for breast cancer recurrence. The clinical validity of the 70-gene prognostic profile has been demonstrated in multiple studies. Results from an international randomized trial, the Microarray in Node-Negative Disease May Avoid Chemotherapy (MINDACT) trial suggest that this genetic profile may identify subsets of patients who have a low likelihood of distant recurrence despite high-risk clinical features.  In this trial, 2607 women, approximately [de-identified] percent of whom had lymph node-negative disease, underwent risk assessment by clinical criteria (using Adjuvant! Online) and by the 70-genetic profile. Patients with discordant clinical and genomic predictions were randomly assigned to receive or not receive adjuvant chemotherapy. Among patients in the intention-to-treat population who had a high clinical risk of recurrence but a low risk by [place] genetic profile, the five-year distant metastases-free survival rate was 95.9 percent with chemotherapy and 94.4 percent without chemotherapy (HR for distant metastasis or death 0.78, 95% CI 0.50-1.21). However, it should be noted that the MINDACT study was not powered to exclude a benefit of chemotherapy. In analysis of discordant groups in the intention-to-treat population, adjuvant chemotherapy was associated with improvement in the rate of distant metastasis-free survival of 2.8 and 2 percent, respectively, for the high clinical/low genomic and low clinical/high genomic risk groups, although these differences were also not statistically significant. Based on MINDACT, ASCO has suggested Madeline Poplin is one of several assays that might be used to determine prognosis for those with high clinical risk, hormone receptor-positive, HER2-negative breast cancer and no or limited (one to three) involved lymph nodes to inform decisions regarding withholding chemotherapy. High risk mammaprint, high risk mindact clinical disease, a discussion of chemotherapy is warranted    I discussed the potential risks of dose-dense chemotherapy with the patient. (DD AC-T, adriamycin 60 mg/m2; cyclophosphamide 600 mg/m2 q 2 weeks x 4; paclitaxel 80 mg/m2 qweekly x 12). Major toxicities include nausea and vomiting, stomatitis, fatigue, and a small risk of heart damage. Anemia frequently results and occasionally requires transfusions. Neutropenic fever is uncommon, but can be a life-threatening problem. Also, there is a small but increased risk of myelodysplasia and acute leukemia.  We provided the patient with detailed information concerning toxicity including frequent toxicities that only last a few days, such as nausea, vomiting, mouth sores, arthralgia, myalgia, and potentially allergic reactions to paclitaxel, as well as toxicities which can be longer lasting including total alopecia, fatigue, anemia and neuropathy. We provided the patient with detailed information concerning the toxicities of their regimen in addition to our verbal discussion. We discussed the toxicities of TC chemotherapy (docetaxel 75mg/m2/cyclophosphamide 600 mg/m2 q 3 weeks x 4)  in detail. This chemotherapy frequently causes a low white blood cell count and a small percent of patients require hospital admission for treatment of neutropenic fever. We explained that on occasion we consider the use of growth factors to minimize this risk. We explained to the patient that some side effects, if they occur, only last a few days including nausea, vomiting, stomatitis, arthralgia, myalgia, and allergic reactions to Taxotere. We told the patient that severe nausea and vomiting were very uncommon and that some side effects, if they occur, will last longer: this includes hair loss, which will be seen in all patients treated with these agents and fatigue, which will be seen in most. The patient was also told that if she is having menstrual function that she could develop chemotherapy-induced amenorrhea and infertility. We also told the patient that there was a very small risk of acute leukemia. We also informed the patient that heart damage is rare with these agents    Using predict, there is < 1% difference in OS between 2nd and 3rd gen chemo. I would then recommend TC for this situation. Cold caps discussed, she will get fitted    Discussed risks of COVID19 and precautions to take. Strongly recommended vaccination, she states she will get.     Discussed oral and peripheral cryotherapy, discussed neuropathy clinical trial and research to meet with her today. After this discussion, she is agreeable to [facility] as above, she has signed informed consent, plan to start on 8/17/21. Dr. Christopher Beltre to place port    The patient was given presciptions for a wig, emla cream, decadron to take 8 mg bid the day before and day after chemo, zofran and compazine. Neulasta the following day. Following chemotherapy, she is an excellent candidate for XRT and endocrine therapy    2. Left breast central ILC, gr 1, multifocal, 1.8 cm and 1.5 cm, ER +, [place] +, HER 2 negative and ER +, [place] negative, HER 2 negative, 0/8 LN involved, stage IA both anatomic and prognostic  Low risk mammaprint    See treatment discussion above for higher risk disease    3. Emotional well being:  She has excellent support and is coping well with her disease    4. Genetic testing:   discussed potential ramifications of a positive test including the potential need for bilateral mastectomies and bilateral oophorectomies and the risk then for her family members to also have a mutation. VUS also discussed. Will perform this at a next blood draw    > 80 min were spent with this patient with > 50% of that time spent in face to face counseling. I appreciate the opportunity to participate in Ms. Guadalupe Zarate's care. Signed By: Aidan Brock MD      No orders of the defined types were placed in this encounter.

## 2021-07-20 NOTE — PROGRESS NOTES
Met with patient regarding interest in clinical trial . Explained the trial to patient and provided information for her review. Will reach out to patient prior to chemo start date for decision. No further questions.

## 2021-07-20 NOTE — PROGRESS NOTES
7/20/21 6:25 PM: Provided patient with a chemotherapy education packet including a handout on breast cancer printed from cancer. net, a handout on TC chemotherapy regimen, a handout on common side effects of chemotherapy, and a handout on how to safely handle bodily secretions and waste after chemotherapy. RN reviewed packet with the patient and opportunity was provided for questions and concerns. Patient was fitted for [place] cooling cap during this time and is in agreement that she will need a small cap and small cover.

## 2021-07-21 DIAGNOSIS — C50.911 BILATERAL MALIGNANT NEOPLASM OF BREAST IN FEMALE, ESTROGEN RECEPTOR POSITIVE, UNSPECIFIED SITE OF BREAST (HCC): Primary | ICD-10-CM

## 2021-07-21 DIAGNOSIS — Z17.0 BILATERAL MALIGNANT NEOPLASM OF BREAST IN FEMALE, ESTROGEN RECEPTOR POSITIVE, UNSPECIFIED SITE OF BREAST (HCC): Primary | ICD-10-CM

## 2021-07-21 DIAGNOSIS — C50.912 BILATERAL MALIGNANT NEOPLASM OF BREAST IN FEMALE, ESTROGEN RECEPTOR POSITIVE, UNSPECIFIED SITE OF BREAST (HCC): Primary | ICD-10-CM

## 2021-07-21 NOTE — PROGRESS NOTES
[facility] [place] at [address], [address] [facility]  W: [phone]  F: [phone]      Reason for Visit:   Geo Ventura is a 61 y.o. female who is seen in consultation at the request of Dr. Venkatesh Coombs for evaluation of therapy for breast cancer    Rad onc:   [address]    Treatment History:   · 5/24/21 right breast mass core bx:  IDC, 7 mm, gr 1-2, no LVI, ER + at 100%, PA + at 95%, HER 2 negative at [facility] 1+, ki67 < 5%  · MRI bx, 6/16/21 breast left site A, posterior bx: ILC, gr 1, 6 mm, ER + at 99%, PA + at 60%, HER 2 negative at [facility] 1+, ki67 19%; left breast site B, anterior bx:  ILC, gr 1, 1.1 cm, ER + at 99%, PA negative, HER 2 negative ([facility] 2+; FISH ratio 1.1 ; sig/cell 2); ki67 18%  · 6/29/21 right breast lumpectomy:  IDC, 2.5 cm, gr 1, DCIS, gr 2, focal 2 mm, 0/7 LN, pT2 pN0 cM0; left breast lumpectomy:  Multifocal ILC, gr 1, 1.8 cm and 1.5 cm, 0/8 LN, pT1c pN0 cM0  · Mammaprint, right breast, high-risk, luminal type B  · Mammaprint, left breast, site B, low-risk, luminal type A    History of Present Illness:    An abnormal right mammogram led to the pathology above    FH:  Father with lung cancer; no breast; paternal aunt with ovarian cancer, no prostate or pancreas cancer    Past Medical History:   Diagnosis Date    Asthma     Cancer ([address] 06/2021    bilateral breast cancer    COVID-19 02/2021    GERD (gastroesophageal reflux disease)     Hyperlipemia     Hypertension       Past Surgical History:   Procedure Laterality Date    HX BREAST LUMPECTOMY Bilateral 6/29/2021    LEFT BREAST LUMPECTOMY WITH BRACKETED NEEDLE LOCALIZATION, LEFT BREAST SENTINEL NODE BIOPSY, RIGHT BREAST LUMPECTOMY WITH ULTRASOUND, RIGHT BREAST SENTINEL NODE BIOPSY performed by Rudy Serrato MD at [facility] HX ORTHOPAEDIC Left 2016    parital left knee arthroplasty    PA ABDOMEN SURGERY PROC UNLISTED  2006    lap nissen      Social History     Tobacco Use    Smoking status: Never Smoker    Smokeless tobacco: Never Used   Substance Use Topics    Alcohol use: Not Currently     Comment:  2 drinks per year      Family History   Problem Relation Age of Onset    Lung Cancer Father      Current Outpatient Medications   Medication Sig    prochlorperazine (COMPAZINE) 10 mg tablet Take 1 Tablet by mouth every six (6) hours as needed for Nausea.  lidocaine-prilocaine (EMLA) topical cream Apply  to affected area as needed for Pain.  ondansetron hcl (ZOFRAN) 8 mg tablet Take 1 Tablet by mouth every eight (8) hours as needed for Nausea.  dexAMETHasone (DECADRON) 4 mg tablet 8 mg PO bid the day before and day after chemo    rosuvastatin (CRESTOR) 20 mg tablet Take 20 mg by mouth daily.  cholecalciferol, vitamin D3, (Vitamin D3) 50 mcg (2,000 unit) tab Take 1 Tablet by mouth daily.  omeprazole (PRILOSEC) 20 mg capsule Take 20 mg by mouth daily.  celecoxib (CELEBREX) 200 mg capsule TAKE 1 CAPSULE BY MOUTH ONCE DAILY WITH FOOD FOR 30 DAYS    lisinopril-hydroCHLOROthiazide (PRINZIDE, ZESTORETIC) 20-25 mg per tablet Take 1 Tablet by mouth daily.  traMADoL (ULTRAM) 50 mg tablet Take 50 mg by mouth every six (6) hours as needed for Pain. No current facility-administered medications for this visit. No Known Allergies     Review of Systems: A complete review of systems was obtained, negative except as described above. Physical Exam:   There were no vitals taken for this visit.   ECOG PS: 0    Constitutional: Appears well-developed and well-nourished in no apparent distress      Mental status: Alert and awake, Oriented to person/place/time, Able to follow commands    Eyes: EOM normal, Sclera normal, No visible ocular discharge    HENT: Normocephalic, atraumatic    Mouth/Throat: Moist mucous membranes    External Ears normal    Neck: No visualized mass    Pulmonary/Chest: Respiratory effort normal, No visualized signs of difficulty breathing or respiratory distress    Musculoskeletal: Normal gait with no signs of ataxia, Normal range of motion of neck    Neurological: No facial asymmetry (Cranial nerve 7 motor function), No gaze palsy    Skin: No significant exanthematous lesions or discoloration noted on facial skin    Psychiatric: Normal affect, No hallucinations               Results:     Lab Results   Component Value Date/Time    WBC 10.2 02/27/2020 07:08 PM    WBC 10.1 02/27/2020 07:08 PM    HGB 14.4 02/27/2020 07:08 PM    HGB 14.4 02/27/2020 07:08 PM    HCT 42.9 02/27/2020 07:08 PM    HCT 43.0 02/27/2020 07:08 PM    PLATELET 836 04/92/7370 07:08 PM    PLATELET 333 91/31/7997 07:08 PM    MCV 88.5 02/27/2020 07:08 PM    MCV 88.7 02/27/2020 07:08 PM    ABS. NEUTROPHILS 5.3 02/27/2020 07:08 PM    ABS. NEUTROPHILS 5.3 02/27/2020 07:08 PM     Lab Results   Component Value Date/Time    Sodium 140 06/25/2021 12:08 PM    Potassium 4.6 06/25/2021 12:08 PM    Chloride 108 06/25/2021 12:08 PM    CO2 25 06/25/2021 12:08 PM    Glucose 86 06/25/2021 12:08 PM    BUN 16 06/25/2021 12:08 PM    Creatinine 0.53 (L) 06/25/2021 12:08 PM    GFR est AA >60 06/25/2021 12:08 PM    GFR est non-AA >60 06/25/2021 12:08 PM    Calcium 9.4 06/25/2021 12:08 PM     Lab Results   Component Value Date/Time    Bilirubin, total 0.4 02/27/2020 07:08 PM    ALT (SGPT) 27 02/27/2020 07:08 PM    Alk. phosphatase 82 02/27/2020 07:08 PM    Protein, total 7.2 02/27/2020 07:08 PM    Albumin 4.2 02/27/2020 07:08 PM    Globulin 3.0 02/27/2020 07:08 PM     6/8/21 MRI breast  FINDINGS:      Background enhancement: Minimal.     Right Breast: At about the 8:00 position and middle depth of the right breast  there is a 5 x 6 x 8 mm enhancing nodule adjacent to the posterior aspect of  HydroMark clip signal void.  There are no other enhancing lesions to suggest any  additional sites of invasive breast carcinoma.     Left Breast: There is a 7 x 9 x 11 mm mass with washout kinetics in the 12:00  position of the central left breast near the junction of the posterior middle  thirds. Slightly superior to this at 12:00 at approximately the junction of the  anterior middle thirds there is a 6 x 9 x 9 mm enhancing focus with some minimal  washout kinetics. There are no other spacious enhancing lesions identified.     Lymph: Unremarkable.     Chest wall and visualized abdomen: Unremarkable.     IMPRESSION  Known right breast carcinoma with no additional right breast lesions  identified. There are two suspicious enhancing masses left breast.     Right Breast:  BI-RADS Assessment Category 6: Known biopsy proven malignancy-  Appropriate action should be taken.     Left Breast:   BI-RADS Assessment Category 4: Suspicious abnormality - Biopsy  should be performed in the absence of clinical contraindication.     RECOMMENDATION: MRI guided left breast biopsy of two lesions. Records reviewed and summarized above. Pathology report(s) reviewed above. Radiology report(s) reviewed above. Assessment/plan:   1. Right breast LOQ IDC, 2.5 cm, 0/7 LN, gr 2, ER +, [place] +, HER 2 negative, stage IIA, prognostic stage IA  High risk mammaprint    We explained to the patient that the goal of systemic adjuvant therapy is to improve the chances for cure and decrease the risk of relapse. We explained why a patient can have microscopic cancer spread now even though physical examination, laboratory studies and imaging studies are negative for cancer. We explained that the same treatments used now as adjuvant or preventive treatments rarely if ever are curative in women who develop metastases. We discussed the potential value of Mammaprint testing. The [place] 70-gene prognostic profile (Mammaprint®), one of the first gene expression array-based prognosticators, classifies tumors as low-risk or high-risk for breast cancer recurrence. The clinical validity of the 70-gene prognostic profile has been demonstrated in multiple studies.  Results from an international randomized trial, the Microarray in Node-Negative Disease May Avoid Chemotherapy (MINDACT) trial suggest that this genetic profile may identify subsets of patients who have a low likelihood of distant recurrence despite high-risk clinical features. In this trial, 1003 women, approximately [de-identified] percent of whom had lymph node-negative disease, underwent risk assessment by clinical criteria (using Adjuvant! Online) and by the 70-genetic profile. Patients with discordant clinical and genomic predictions were randomly assigned to receive or not receive adjuvant chemotherapy. Among patients in the intention-to-treat population who had a high clinical risk of recurrence but a low risk by Bryant genetic profile, the five-year distant metastases-free survival rate was 95.9 percent with chemotherapy and 94.4 percent without chemotherapy (HR for distant metastasis or death 0.78, 95% CI 0.50-1.21). However, it should be noted that the MINDACT study was not powered to exclude a benefit of chemotherapy. In analysis of discordant groups in the intention-to-treat population, adjuvant chemotherapy was associated with improvement in the rate of distant metastasis-free survival of 2.8 and 2 percent, respectively, for the high clinical/low genomic and low clinical/high genomic risk groups, although these differences were also not statistically significant. Based on MINDACT, ASCO has suggested Ardean Cowman is one of several assays that might be used to determine prognosis for those with high clinical risk, hormone receptor-positive, HER2-negative breast cancer and no or limited (one to three) involved lymph nodes to inform decisions regarding withholding chemotherapy. High risk mammaprint, high risk mindact clinical disease, a discussion of chemotherapy is warranted    I discussed the potential risks of dose-dense chemotherapy with the patient.   (DD AC-T, adriamycin 60 mg/m2; cyclophosphamide 600 mg/m2 q 2 weeks x 4; paclitaxel 80 mg/m2 qweekly x 12). Major toxicities include nausea and vomiting, stomatitis, fatigue, and a small risk of heart damage. Anemia frequently results and occasionally requires transfusions. Neutropenic fever is uncommon, but can be a life-threatening problem. Also, there is a small but increased risk of myelodysplasia and acute leukemia. We provided the patient with detailed information concerning toxicity including frequent toxicities that only last a few days, such as nausea, vomiting, mouth sores, arthralgia, myalgia, and potentially allergic reactions to paclitaxel, as well as toxicities which can be longer lasting including total alopecia, fatigue, anemia and neuropathy. We provided the patient with detailed information concerning the toxicities of their regimen in addition to our verbal discussion. We discussed the toxicities of TC chemotherapy (docetaxel 75mg/m2/cyclophosphamide 600 mg/m2 q 3 weeks x 4)  in detail. This chemotherapy frequently causes a low white blood cell count and a small percent of patients require hospital admission for treatment of neutropenic fever. We explained that on occasion we consider the use of growth factors to minimize this risk. We explained to the patient that some side effects, if they occur, only last a few days including nausea, vomiting, stomatitis, arthralgia, myalgia, and allergic reactions to Taxotere. We told the patient that severe nausea and vomiting were very uncommon and that some side effects, if they occur, will last longer: this includes hair loss, which will be seen in all patients treated with these agents and fatigue, which will be seen in most. The patient was also told that if she is having menstrual function that she could develop chemotherapy-induced amenorrhea and infertility. We also told the patient that there was a very small risk of acute leukemia.  We also informed the patient that heart damage is rare with these agents    Using predict, there is < 1% difference in OS between 2nd and 3rd gen chemo. I would then recommend TC for this situation. Cold caps discussed, she will get fitted    Discussed risks of COVID19 and precautions to take. Strongly recommended vaccination, she states she will get. Discussed oral and peripheral cryotherapy, discussed neuropathy clinical trial and research to meet with her today. After this discussion, she is agreeable to [facility] as above, she has signed informed consent, plan to start on 8/17/21. Dr. Tayler Wilkerson to place port    The patient was given presciptions for a wig, emla cream, decadron to take 8 mg bid the day before and day after chemo, zofran and compazine. Neulasta the following day. Following chemotherapy, she is an excellent candidate for XRT and endocrine therapy    2. Left breast central ILC, gr 1, multifocal, 1.8 cm and 1.5 cm, ER +, DC +, HER 2 negative and ER +, DC negative, HER 2 negative, 0/8 LN involved, stage IA both anatomic and prognostic  Low risk mammaprint    See treatment discussion above for higher risk disease    3. Emotional well being:  She has excellent support and is coping well with her disease    4. Genetic testing:   discussed potential ramifications of a positive test including the potential need for bilateral mastectomies and bilateral oophorectomies and the risk then for her family members to also have a mutation. VUS also discussed. Will perform this at a next blood draw    > 80 min were spent with this patient with > 50% of that time spent in face to face counseling. I appreciate the opportunity to participate in Ms. Guadalupe Zarate's care. Signed By: Jerry Coleman NP      No orders of the defined types were placed in this encounter.

## 2021-07-21 NOTE — NURSE NAVIGATOR
[address]  Breast Navigator Encounter    Name: Lit Cisneros  Age: 61 y.o.  : 1960  Diagnosis: Right breast IDC; ER+/WI+/HER2-;  Stage IIA; High risk mammaprint     Encounter type:  []Patient Initiated  []Navigator Follow-up []Pre-op  []Post-op  []Check-in Prior to First Treatment []Treatment Modality Change  []Survivorship Transition [x]Other:     Narrative:   Met pt while at clinic during her med/onc consult. Pt accompanied by friend, Griselda Prhilario, and verbalizes a good support system. She mentions understanding having a high risk Mammaprint and that this most likely means chemotherapy. I ensure her that Dr. Goran Flores will discuss this with her in more detail. No immediate barriers to care identified at this time. Contact info given and pt encouraged to reach out with any questions or concerns. Will follow-up.      Referrals/Handouts:  Breast Navigator handout with contact info  BSR breast cancer support group flyer  [employer] support letter    Interdisciplinary Team:  Med-Onc: Dr. Karyna Godfrey MD  Surg-Onc: Dr. Vidhi Sargent MD  Rad-Onc: Dr. Tae Carrera MD  Plastics:  : Elke Bruno LCSW  Nurse Navigator:  KENNEDY HurstN, RN, LUKE Myles, RN, VIA [facility]  Oncology Breast [address] [place] [address]  W: [phone]  F: [phone]  [email or web address]   Good Help to Those in [facility]

## 2021-07-21 NOTE — PROGRESS NOTES
Patient states she has not completed a Covid-19 vaccine series nor has she tested + for Covid-19 in the last 14-90d. Scheduled for screening 4d prior to surgery. Patient verbalized understanding of instructions/directions for Curbside Service.

## 2021-07-22 ENCOUNTER — HOSPITAL ENCOUNTER (OUTPATIENT)
Dept: RADIATION THERAPY | Age: 61
Discharge: HOME | End: 2021-07-22

## 2021-07-22 VITALS — WEIGHT: 186.5 LBS | HEIGHT: 60 IN | BODY MASS INDEX: 36.61 KG/M2

## 2021-07-22 RX ORDER — SODIUM CHLORIDE 9 MG/ML
25 INJECTION, SOLUTION INTRAVENOUS CONTINUOUS
Status: CANCELLED | OUTPATIENT
Start: 2021-09-28

## 2021-07-22 RX ORDER — ONDANSETRON 2 MG/ML
8 INJECTION INTRAMUSCULAR; INTRAVENOUS AS NEEDED
Status: CANCELLED | OUTPATIENT
Start: 2021-08-17

## 2021-07-22 RX ORDER — ACETAMINOPHEN 325 MG/1
650 TABLET ORAL AS NEEDED
Status: CANCELLED
Start: 2021-08-17

## 2021-07-22 RX ORDER — PALONOSETRON 0.05 MG/ML
0.25 INJECTION, SOLUTION INTRAVENOUS ONCE
Status: CANCELLED | OUTPATIENT
Start: 2021-09-28 | End: 2021-09-28

## 2021-07-22 RX ORDER — HYDROCORTISONE SODIUM SUCCINATE 100 MG/2ML
100 INJECTION, POWDER, FOR SOLUTION INTRAMUSCULAR; INTRAVENOUS AS NEEDED
Status: CANCELLED | OUTPATIENT
Start: 2021-09-07

## 2021-07-22 RX ORDER — EPINEPHRINE 1 MG/ML
0.3 INJECTION, SOLUTION, CONCENTRATE INTRAVENOUS AS NEEDED
Status: CANCELLED | OUTPATIENT
Start: 2021-10-19

## 2021-07-22 RX ORDER — DEXAMETHASONE SODIUM PHOSPHATE 100 MG/10ML
10 INJECTION INTRAMUSCULAR; INTRAVENOUS ONCE
Status: CANCELLED | OUTPATIENT
Start: 2021-08-17 | End: 2021-08-17

## 2021-07-22 RX ORDER — SODIUM CHLORIDE 9 MG/ML
25 INJECTION, SOLUTION INTRAVENOUS CONTINUOUS
Status: CANCELLED | OUTPATIENT
Start: 2021-10-19

## 2021-07-22 RX ORDER — PALONOSETRON 0.05 MG/ML
0.25 INJECTION, SOLUTION INTRAVENOUS ONCE
Status: CANCELLED | OUTPATIENT
Start: 2021-08-17 | End: 2021-08-17

## 2021-07-22 RX ORDER — DIPHENHYDRAMINE HYDROCHLORIDE 50 MG/ML
50 INJECTION, SOLUTION INTRAMUSCULAR; INTRAVENOUS AS NEEDED
Status: CANCELLED
Start: 2021-08-17

## 2021-07-22 RX ORDER — SODIUM CHLORIDE 0.9 % (FLUSH) 0.9 %
10 SYRINGE (ML) INJECTION AS NEEDED
Status: CANCELLED | OUTPATIENT
Start: 2021-10-19

## 2021-07-22 RX ORDER — SODIUM CHLORIDE 9 MG/ML
25 INJECTION, SOLUTION INTRAVENOUS CONTINUOUS
Status: CANCELLED | OUTPATIENT
Start: 2021-08-17

## 2021-07-22 RX ORDER — HEPARIN 100 UNIT/ML
300-500 SYRINGE INTRAVENOUS AS NEEDED
Status: CANCELLED
Start: 2021-09-28

## 2021-07-22 RX ORDER — SODIUM CHLORIDE 9 MG/ML
10 INJECTION INTRAMUSCULAR; INTRAVENOUS; SUBCUTANEOUS AS NEEDED
Status: CANCELLED | OUTPATIENT
Start: 2021-08-17

## 2021-07-22 RX ORDER — ACETAMINOPHEN 325 MG/1
650 TABLET ORAL AS NEEDED
Status: CANCELLED
Start: 2021-09-28

## 2021-07-22 RX ORDER — EPINEPHRINE 1 MG/ML
0.3 INJECTION, SOLUTION, CONCENTRATE INTRAVENOUS AS NEEDED
Status: CANCELLED | OUTPATIENT
Start: 2021-09-07

## 2021-07-22 RX ORDER — SODIUM CHLORIDE 0.9 % (FLUSH) 0.9 %
10 SYRINGE (ML) INJECTION AS NEEDED
Status: CANCELLED | OUTPATIENT
Start: 2021-09-07

## 2021-07-22 RX ORDER — PALONOSETRON 0.05 MG/ML
0.25 INJECTION, SOLUTION INTRAVENOUS ONCE
Status: CANCELLED | OUTPATIENT
Start: 2021-09-07 | End: 2021-09-07

## 2021-07-22 RX ORDER — HEPARIN 100 UNIT/ML
300-500 SYRINGE INTRAVENOUS AS NEEDED
Status: CANCELLED
Start: 2021-09-07

## 2021-07-22 RX ORDER — DIPHENHYDRAMINE HYDROCHLORIDE 50 MG/ML
25 INJECTION, SOLUTION INTRAMUSCULAR; INTRAVENOUS AS NEEDED
Status: CANCELLED
Start: 2021-09-07

## 2021-07-22 RX ORDER — DEXAMETHASONE SODIUM PHOSPHATE 100 MG/10ML
10 INJECTION INTRAMUSCULAR; INTRAVENOUS ONCE
Status: CANCELLED | OUTPATIENT
Start: 2021-09-28 | End: 2021-09-28

## 2021-07-22 RX ORDER — EPINEPHRINE 1 MG/ML
0.3 INJECTION, SOLUTION, CONCENTRATE INTRAVENOUS AS NEEDED
Status: CANCELLED | OUTPATIENT
Start: 2021-08-17

## 2021-07-22 RX ORDER — DIPHENHYDRAMINE HYDROCHLORIDE 50 MG/ML
50 INJECTION, SOLUTION INTRAMUSCULAR; INTRAVENOUS AS NEEDED
Status: CANCELLED
Start: 2021-09-28

## 2021-07-22 RX ORDER — DIPHENHYDRAMINE HYDROCHLORIDE 50 MG/ML
25 INJECTION, SOLUTION INTRAMUSCULAR; INTRAVENOUS AS NEEDED
Status: CANCELLED
Start: 2021-08-17

## 2021-07-22 RX ORDER — ONDANSETRON 2 MG/ML
8 INJECTION INTRAMUSCULAR; INTRAVENOUS AS NEEDED
Status: CANCELLED | OUTPATIENT
Start: 2021-09-07

## 2021-07-22 RX ORDER — SODIUM CHLORIDE 0.9 % (FLUSH) 0.9 %
10 SYRINGE (ML) INJECTION AS NEEDED
Status: CANCELLED | OUTPATIENT
Start: 2021-09-28

## 2021-07-22 RX ORDER — ALBUTEROL SULFATE 0.83 MG/ML
2.5 SOLUTION RESPIRATORY (INHALATION) AS NEEDED
Status: CANCELLED
Start: 2021-09-28

## 2021-07-22 RX ORDER — ALBUTEROL SULFATE 0.83 MG/ML
2.5 SOLUTION RESPIRATORY (INHALATION) AS NEEDED
Status: CANCELLED
Start: 2021-09-07

## 2021-07-22 RX ORDER — DIPHENHYDRAMINE HYDROCHLORIDE 50 MG/ML
25 INJECTION, SOLUTION INTRAMUSCULAR; INTRAVENOUS AS NEEDED
Status: CANCELLED
Start: 2021-10-19

## 2021-07-22 RX ORDER — HEPARIN 100 UNIT/ML
300-500 SYRINGE INTRAVENOUS AS NEEDED
Status: CANCELLED
Start: 2021-08-17

## 2021-07-22 RX ORDER — HYDROCORTISONE SODIUM SUCCINATE 100 MG/2ML
100 INJECTION, POWDER, FOR SOLUTION INTRAMUSCULAR; INTRAVENOUS AS NEEDED
Status: CANCELLED | OUTPATIENT
Start: 2021-09-28

## 2021-07-22 RX ORDER — SODIUM CHLORIDE 9 MG/ML
25 INJECTION, SOLUTION INTRAVENOUS CONTINUOUS
Status: CANCELLED | OUTPATIENT
Start: 2021-09-07

## 2021-07-22 RX ORDER — ALBUTEROL SULFATE 0.83 MG/ML
2.5 SOLUTION RESPIRATORY (INHALATION) AS NEEDED
Status: CANCELLED
Start: 2021-08-17

## 2021-07-22 RX ORDER — DEXAMETHASONE SODIUM PHOSPHATE 100 MG/10ML
10 INJECTION INTRAMUSCULAR; INTRAVENOUS ONCE
Status: CANCELLED | OUTPATIENT
Start: 2021-09-07 | End: 2021-09-07

## 2021-07-22 RX ORDER — HYDROCORTISONE SODIUM SUCCINATE 100 MG/2ML
100 INJECTION, POWDER, FOR SOLUTION INTRAMUSCULAR; INTRAVENOUS AS NEEDED
Status: CANCELLED | OUTPATIENT
Start: 2021-10-19

## 2021-07-22 RX ORDER — DIPHENHYDRAMINE HYDROCHLORIDE 50 MG/ML
50 INJECTION, SOLUTION INTRAMUSCULAR; INTRAVENOUS AS NEEDED
Status: CANCELLED
Start: 2021-10-19

## 2021-07-22 RX ORDER — SODIUM CHLORIDE 0.9 % (FLUSH) 0.9 %
10 SYRINGE (ML) INJECTION AS NEEDED
Status: CANCELLED | OUTPATIENT
Start: 2021-08-17

## 2021-07-22 RX ORDER — ONDANSETRON 2 MG/ML
8 INJECTION INTRAMUSCULAR; INTRAVENOUS AS NEEDED
Status: CANCELLED | OUTPATIENT
Start: 2021-09-28

## 2021-07-22 RX ORDER — PALONOSETRON 0.05 MG/ML
0.25 INJECTION, SOLUTION INTRAVENOUS ONCE
Status: CANCELLED | OUTPATIENT
Start: 2021-10-19 | End: 2021-10-19

## 2021-07-22 RX ORDER — ACETAMINOPHEN 325 MG/1
650 TABLET ORAL AS NEEDED
Status: CANCELLED
Start: 2021-10-19

## 2021-07-22 RX ORDER — SODIUM CHLORIDE 9 MG/ML
10 INJECTION INTRAMUSCULAR; INTRAVENOUS; SUBCUTANEOUS AS NEEDED
Status: CANCELLED | OUTPATIENT
Start: 2021-09-07

## 2021-07-22 RX ORDER — ALBUTEROL SULFATE 0.83 MG/ML
2.5 SOLUTION RESPIRATORY (INHALATION) AS NEEDED
Status: CANCELLED
Start: 2021-10-19

## 2021-07-22 RX ORDER — DEXAMETHASONE SODIUM PHOSPHATE 100 MG/10ML
10 INJECTION INTRAMUSCULAR; INTRAVENOUS ONCE
Status: CANCELLED | OUTPATIENT
Start: 2021-10-19 | End: 2021-10-19

## 2021-07-22 RX ORDER — SODIUM CHLORIDE 9 MG/ML
10 INJECTION INTRAMUSCULAR; INTRAVENOUS; SUBCUTANEOUS AS NEEDED
Status: CANCELLED | OUTPATIENT
Start: 2021-10-19

## 2021-07-22 RX ORDER — HEPARIN 100 UNIT/ML
300-500 SYRINGE INTRAVENOUS AS NEEDED
Status: CANCELLED
Start: 2021-10-19

## 2021-07-22 RX ORDER — ACETAMINOPHEN 325 MG/1
650 TABLET ORAL AS NEEDED
Status: CANCELLED
Start: 2021-09-07

## 2021-07-22 RX ORDER — HYDROCORTISONE SODIUM SUCCINATE 100 MG/2ML
100 INJECTION, POWDER, FOR SOLUTION INTRAMUSCULAR; INTRAVENOUS AS NEEDED
Status: CANCELLED | OUTPATIENT
Start: 2021-08-17

## 2021-07-22 RX ORDER — DIPHENHYDRAMINE HYDROCHLORIDE 50 MG/ML
25 INJECTION, SOLUTION INTRAMUSCULAR; INTRAVENOUS AS NEEDED
Status: CANCELLED
Start: 2021-09-28

## 2021-07-22 RX ORDER — ONDANSETRON 2 MG/ML
8 INJECTION INTRAMUSCULAR; INTRAVENOUS AS NEEDED
Status: CANCELLED | OUTPATIENT
Start: 2021-10-19

## 2021-07-22 RX ORDER — SODIUM CHLORIDE 9 MG/ML
10 INJECTION INTRAMUSCULAR; INTRAVENOUS; SUBCUTANEOUS AS NEEDED
Status: CANCELLED | OUTPATIENT
Start: 2021-09-28

## 2021-07-22 RX ORDER — DIPHENHYDRAMINE HYDROCHLORIDE 50 MG/ML
50 INJECTION, SOLUTION INTRAMUSCULAR; INTRAVENOUS AS NEEDED
Status: CANCELLED
Start: 2021-09-07

## 2021-07-22 RX ORDER — EPINEPHRINE 1 MG/ML
0.3 INJECTION, SOLUTION, CONCENTRATE INTRAVENOUS AS NEEDED
Status: CANCELLED | OUTPATIENT
Start: 2021-09-28

## 2021-07-26 ENCOUNTER — ANESTHESIA EVENT (OUTPATIENT)
Dept: SURGERY | Age: 61
End: 2021-07-26
Payer: COMMERCIAL

## 2021-07-30 NOTE — PERIOP NOTES
Pt states that her second dose of Covid vaccine is due on the 9th of August, surgery is on the 10th, instructed pt to call Dr. Gracia Khan office to see if they want to change surgery date. Pt has been  instructed to go ahead and get Covid test on the 6th of august since she is between doses of vaccine.

## 2021-07-30 NOTE — PERIOP NOTES
[address] [address] [facility]   MAIN OR                                  [phone]   MAIN PRE OP                          [phone]                                                                                AMBULATORY PRE OP          [phone]  PRE-ADMISSION TESTING    [phone]   Surgery Date:  tuesday8/10/21       Is surgery arrival time given by surgeon? YES  NO  If NO, [facility] staff will call you between 3 and 7pm the day before your surgery with your arrival time. (If your surgery is on a Monday, we will call you the Friday before.)    Call [phone] after 7pm Monday-Friday if you did not receive this call. INSTRUCTIONS BEFORE YOUR SURGERY   When You  Arrive Arrive at the 2nd [address] [facility] on the day of your surgery  Have your insurance card, photo ID, and any copayment (if needed)   Food   and   Drink NO food or drink after midnight the night before surgery    This means NO water, gum, mints, coffee, juice, etc.  No alcohol (beer, wine, liquor) 24 hours before and after surgery   Medications to   TAKE   Morning of Surgery MEDICATIONS TO TAKE THE MORNING OF SURGERY WITH A SIP OF WATER:       Medications  To  STOP      7 days before surgery  Non-Steroidal anti-inflammatory Drugs (NSAID's): for example, Ibuprofen (Advil, Motrin), Naproxen (Aleve)   Aspirin, if taking for pain    Herbal supplements, vitamins, and fish oil   Other:  (Pain medications not listed above, including Tylenol may be taken)   Blood  Thinners  If you take  Aspirin, Plavix, Coumadin, or any blood-thinning or anti-blood clot medicine, talk to the doctor who prescribed the medications for pre-operative instructions.    Bathing Clothing  Jewelry  Valuables      If you shower the morning of surgery, please do not apply anything to your skin (lotions, powders, deodorant, or makeup, especially mascara)   Follow Chlorhexidine [employer] body wash instructions provided to you during PAT appointment. Begin 3 days prior to surgery.  Do not shave or trim anywhere 24 hours before surgery   Wear your hair loose or down; no pony-tails, buns, or metal hair clips   Wear loose, comfortable, clean clothes   Wear glasses instead of contacts  [employer] money, valuables, and jewelry, including body piercings, at home   If you were given an [employer], bring it on day of surgery. Going Home - or Spending the Night  SAME-DAY SURGERY: You must have a responsible adult drive you home and stay with you 24 hours after surgery   ADMITS: If your doctor is keeping you in the hospital after surgery, leave personal belongings/luggage in your car until you have a hospital room number. Hospital discharge time is 12 noon  Drivers must be here before 12 noon unless you are told differently   Special Instructions It is now mandated that all surgical patients be tested for COVID-19 prior to surgery. Testing has to be exactly 4 days prior to surgery. Your COVID test date is 8/6/21 between 8:00 am and 11:00 am.       COVID testing will be performed curbside at the [facility] [address]. There will be signs leading you to the testing site. You will need to bring a photo ID with you to be swabbed. Patients are advised to self-quarantine at home after testing and prior to your surgery date. You will be notified if your results are positive.     What to watch for:   Coronavirus (COVID-19) affects different people in different ways   It also appears with a wide range of symptoms from mild to severe   Signs usually appear 2-14 days after exposure     If you develop any of the following, notify your doctor immediately:  o Fever  o Chills, with or without a shiver  o Muscle pain  o Headache  o Sore throat  o Dry cough  o New loss of taste or smell  o Tiredness      If you develop any of the following, call 911:  o Shortness of breath  o Difficulty breathing  o Chest pain  o New confusion  o Blueness of fingers and/or lips       Follow all instructions so your surgery wont be cancelled. Please, be on time. If a situation occurs and you are delayed the day of surgery, call  [phone]. If your physical condition changes (like a fever, cold, flu, etc.) call your surgeon. Home medication(s) reviewed and verified via   PHONE      during PAT appointment. The patient was contacted by  PHONE      The patient verbalizes understanding of all instructions and      DOES NOT   need reinforcement.

## 2021-08-05 ENCOUNTER — TELEPHONE (OUTPATIENT)
Dept: SURGERY | Age: 61
End: 2021-08-05

## 2021-08-05 NOTE — TELEPHONE ENCOUNTER
----- Message from Elieser Miranda MD sent at 8/5/2021 12:18 PM EDT -----  Regarding: RE: covid shot  None that I know of    ----- Message -----  From: Scott Alexander RN  Sent: 8/5/2021  11:40 AM EDT  To: Elieser Miranda MD  Subject: covid shot                                       Dr. Ryann Puentes,  Is there any contraindication for the patient to receive her covid shot 2 days prior to port insertion, especially considering that she is post op bilateral lumpectomies and SLNB's?   Thanks,D

## 2021-08-06 ENCOUNTER — HOSPITAL ENCOUNTER (OUTPATIENT)
Dept: PREADMISSION TESTING | Age: 61
Discharge: HOME | End: 2021-08-06
Payer: COMMERCIAL

## 2021-08-06 PROCEDURE — U0005 INFEC AGEN DETEC AMPLI PROBE: HCPCS

## 2021-08-07 LAB
SARS-COV-2, XPLCVT: NOT DETECTED
SOURCE, COVRS: NORMAL

## 2021-08-10 ENCOUNTER — APPOINTMENT (OUTPATIENT)
Dept: GENERAL RADIOLOGY | Age: 61
End: 2021-08-10
Attending: SURGERY
Payer: COMMERCIAL

## 2021-08-10 ENCOUNTER — HOSPITAL ENCOUNTER (OUTPATIENT)
Age: 61
Setting detail: OUTPATIENT SURGERY
Discharge: HOME | End: 2021-08-10
Attending: SURGERY | Admitting: SURGERY
Payer: COMMERCIAL

## 2021-08-10 ENCOUNTER — ANESTHESIA (OUTPATIENT)
Dept: SURGERY | Age: 61
End: 2021-08-10
Payer: COMMERCIAL

## 2021-08-10 VITALS
OXYGEN SATURATION: 97 % | RESPIRATION RATE: 12 BRPM | DIASTOLIC BLOOD PRESSURE: 76 MMHG | WEIGHT: 188.05 LBS | HEART RATE: 82 BPM | BODY MASS INDEX: 36.92 KG/M2 | TEMPERATURE: 98 F | SYSTOLIC BLOOD PRESSURE: 128 MMHG | HEIGHT: 60 IN

## 2021-08-10 DIAGNOSIS — Z17.0 BILATERAL MALIGNANT NEOPLASM OF BREAST IN FEMALE, ESTROGEN RECEPTOR POSITIVE, UNSPECIFIED SITE OF BREAST (HCC): ICD-10-CM

## 2021-08-10 DIAGNOSIS — C50.912 BILATERAL MALIGNANT NEOPLASM OF BREAST IN FEMALE, ESTROGEN RECEPTOR POSITIVE, UNSPECIFIED SITE OF BREAST (HCC): ICD-10-CM

## 2021-08-10 DIAGNOSIS — C50.911 BILATERAL MALIGNANT NEOPLASM OF BREAST IN FEMALE, ESTROGEN RECEPTOR POSITIVE, UNSPECIFIED SITE OF BREAST (HCC): ICD-10-CM

## 2021-08-10 PROCEDURE — 77030040361 HC SLV COMPR DVT MDII -B

## 2021-08-10 PROCEDURE — 77030011267 HC ELECTRD BLD COVD -A: Performed by: SURGERY

## 2021-08-10 PROCEDURE — C1788 PORT, INDWELLING, IMP: HCPCS | Performed by: SURGERY

## 2021-08-10 PROCEDURE — 77001 FLUOROGUIDE FOR VEIN DEVICE: CPT | Performed by: SURGERY

## 2021-08-10 PROCEDURE — 76060000061 HC AMB SURG ANES 0.5 TO 1 HR: Performed by: SURGERY

## 2021-08-10 PROCEDURE — 2709999900 HC NON-CHARGEABLE SUPPLY: Performed by: SURGERY

## 2021-08-10 PROCEDURE — 74011250636 HC RX REV CODE- 250/636: Performed by: SURGERY

## 2021-08-10 PROCEDURE — 76210000046 HC AMBSU PH II REC FIRST 0.5 HR: Performed by: SURGERY

## 2021-08-10 PROCEDURE — 74011000250 HC RX REV CODE- 250: Performed by: SURGERY

## 2021-08-10 PROCEDURE — 76210000040 HC AMBSU PH I REC FIRST 0.5 HR: Performed by: SURGERY

## 2021-08-10 PROCEDURE — 74011000250 HC RX REV CODE- 250: Performed by: NURSE ANESTHETIST, CERTIFIED REGISTERED

## 2021-08-10 PROCEDURE — 71045 X-RAY EXAM CHEST 1 VIEW: CPT

## 2021-08-10 PROCEDURE — 36561 INSERT TUNNELED CV CATH: CPT | Performed by: SURGERY

## 2021-08-10 PROCEDURE — 74011250636 HC RX REV CODE- 250/636: Performed by: ANESTHESIOLOGY

## 2021-08-10 PROCEDURE — 77030010507 HC ADH SKN DERMBND J&J -B: Performed by: SURGERY

## 2021-08-10 PROCEDURE — 76030000000 HC AMB SURG OR TIME 0.5 TO 1: Performed by: SURGERY

## 2021-08-10 PROCEDURE — 74011250636 HC RX REV CODE- 250/636: Performed by: NURSE ANESTHETIST, CERTIFIED REGISTERED

## 2021-08-10 DEVICE — POWERPORT IMPLANTABLE PORT WITH ATTACHABLE 8F CHRONOFLEX OPEN-ENDED SINGLE-LUMEN VENOUS CATHETER INTERMEDIATE KIT (WITH SUTURE PLUGS)
Type: IMPLANTABLE DEVICE | Site: CHEST | Status: NON-FUNCTIONAL
Brand: POWERPORT, CHRONOFLEX
Removed: 2022-02-01

## 2021-08-10 RX ORDER — LIDOCAINE HYDROCHLORIDE 20 MG/ML
INJECTION, SOLUTION INFILTRATION; PERINEURAL AS NEEDED
Status: DISCONTINUED | OUTPATIENT
Start: 2021-08-10 | End: 2021-08-10 | Stop reason: HOSPADM

## 2021-08-10 RX ORDER — LIDOCAINE HYDROCHLORIDE AND EPINEPHRINE 10; 10 MG/ML; UG/ML
INJECTION, SOLUTION INFILTRATION; PERINEURAL AS NEEDED
Status: DISCONTINUED | OUTPATIENT
Start: 2021-08-10 | End: 2021-08-10 | Stop reason: HOSPADM

## 2021-08-10 RX ORDER — DIPHENHYDRAMINE HYDROCHLORIDE 50 MG/ML
12.5 INJECTION, SOLUTION INTRAMUSCULAR; INTRAVENOUS AS NEEDED
Status: DISCONTINUED | OUTPATIENT
Start: 2021-08-10 | End: 2021-08-10 | Stop reason: HOSPADM

## 2021-08-10 RX ORDER — FENTANYL CITRATE 50 UG/ML
INJECTION, SOLUTION INTRAMUSCULAR; INTRAVENOUS AS NEEDED
Status: DISCONTINUED | OUTPATIENT
Start: 2021-08-10 | End: 2021-08-10 | Stop reason: HOSPADM

## 2021-08-10 RX ORDER — NALOXONE HYDROCHLORIDE 0.4 MG/ML
0.2 INJECTION, SOLUTION INTRAMUSCULAR; INTRAVENOUS; SUBCUTANEOUS
Status: DISCONTINUED | OUTPATIENT
Start: 2021-08-10 | End: 2021-08-10 | Stop reason: HOSPADM

## 2021-08-10 RX ORDER — SODIUM CHLORIDE, SODIUM LACTATE, POTASSIUM CHLORIDE, CALCIUM CHLORIDE 600; 310; 30; 20 MG/100ML; MG/100ML; MG/100ML; MG/100ML
125 INJECTION, SOLUTION INTRAVENOUS CONTINUOUS
Status: DISCONTINUED | OUTPATIENT
Start: 2021-08-10 | End: 2021-08-10 | Stop reason: HOSPADM

## 2021-08-10 RX ORDER — LIDOCAINE HYDROCHLORIDE AND EPINEPHRINE 10; 10 MG/ML; UG/ML
30 INJECTION, SOLUTION INFILTRATION; PERINEURAL ONCE
Status: DISCONTINUED | OUTPATIENT
Start: 2021-08-10 | End: 2021-08-10 | Stop reason: HOSPADM

## 2021-08-10 RX ORDER — HYDROMORPHONE HYDROCHLORIDE 1 MG/ML
.25-1 INJECTION, SOLUTION INTRAMUSCULAR; INTRAVENOUS; SUBCUTANEOUS
Status: DISCONTINUED | OUTPATIENT
Start: 2021-08-10 | End: 2021-08-10 | Stop reason: HOSPADM

## 2021-08-10 RX ORDER — LIDOCAINE HYDROCHLORIDE 10 MG/ML
0.1 INJECTION, SOLUTION EPIDURAL; INFILTRATION; INTRACAUDAL; PERINEURAL AS NEEDED
Status: DISCONTINUED | OUTPATIENT
Start: 2021-08-10 | End: 2021-08-10 | Stop reason: HOSPADM

## 2021-08-10 RX ORDER — PROPOFOL 10 MG/ML
INJECTION, EMULSION INTRAVENOUS
Status: DISCONTINUED | OUTPATIENT
Start: 2021-08-10 | End: 2021-08-10 | Stop reason: HOSPADM

## 2021-08-10 RX ORDER — ONDANSETRON 2 MG/ML
INJECTION INTRAMUSCULAR; INTRAVENOUS AS NEEDED
Status: DISCONTINUED | OUTPATIENT
Start: 2021-08-10 | End: 2021-08-10 | Stop reason: HOSPADM

## 2021-08-10 RX ORDER — FLUMAZENIL 0.1 MG/ML
0.2 INJECTION INTRAVENOUS
Status: DISCONTINUED | OUTPATIENT
Start: 2021-08-10 | End: 2021-08-10 | Stop reason: HOSPADM

## 2021-08-10 RX ORDER — HEPARIN SODIUM (PORCINE) LOCK FLUSH IV SOLN 100 UNIT/ML 100 UNIT/ML
SOLUTION INTRAVENOUS AS NEEDED
Status: DISCONTINUED | OUTPATIENT
Start: 2021-08-10 | End: 2021-08-10 | Stop reason: HOSPADM

## 2021-08-10 RX ORDER — MIDAZOLAM HYDROCHLORIDE 1 MG/ML
INJECTION, SOLUTION INTRAMUSCULAR; INTRAVENOUS AS NEEDED
Status: DISCONTINUED | OUTPATIENT
Start: 2021-08-10 | End: 2021-08-10 | Stop reason: HOSPADM

## 2021-08-10 RX ORDER — BUPIVACAINE HYDROCHLORIDE AND EPINEPHRINE 5; 5 MG/ML; UG/ML
INJECTION, SOLUTION EPIDURAL; INTRACAUDAL; PERINEURAL AS NEEDED
Status: DISCONTINUED | OUTPATIENT
Start: 2021-08-10 | End: 2021-08-10 | Stop reason: HOSPADM

## 2021-08-10 RX ORDER — BUPIVACAINE HYDROCHLORIDE AND EPINEPHRINE 5; 5 MG/ML; UG/ML
30 INJECTION, SOLUTION EPIDURAL; INTRACAUDAL; PERINEURAL ONCE
Status: DISCONTINUED | OUTPATIENT
Start: 2021-08-10 | End: 2021-08-10 | Stop reason: HOSPADM

## 2021-08-10 RX ORDER — PROPOFOL 10 MG/ML
INJECTION, EMULSION INTRAVENOUS AS NEEDED
Status: DISCONTINUED | OUTPATIENT
Start: 2021-08-10 | End: 2021-08-10 | Stop reason: HOSPADM

## 2021-08-10 RX ADMIN — PROPOFOL 50 MG: 10 INJECTION, EMULSION INTRAVENOUS at 10:44

## 2021-08-10 RX ADMIN — FENTANYL CITRATE 25 MCG: 50 INJECTION, SOLUTION INTRAMUSCULAR; INTRAVENOUS at 10:37

## 2021-08-10 RX ADMIN — SODIUM CHLORIDE, POTASSIUM CHLORIDE, SODIUM LACTATE AND CALCIUM CHLORIDE 125 ML/HR: 600; 310; 30; 20 INJECTION, SOLUTION INTRAVENOUS at 09:31

## 2021-08-10 RX ADMIN — MIDAZOLAM 2 MG: 1 INJECTION, SOLUTION INTRAMUSCULAR; INTRAVENOUS at 10:37

## 2021-08-10 RX ADMIN — ONDANSETRON HYDROCHLORIDE 4 MG: 2 SOLUTION INTRAMUSCULAR; INTRAVENOUS at 11:12

## 2021-08-10 RX ADMIN — FENTANYL CITRATE 25 MCG: 50 INJECTION, SOLUTION INTRAMUSCULAR; INTRAVENOUS at 10:52

## 2021-08-10 RX ADMIN — LIDOCAINE HYDROCHLORIDE 80 MG: 20 INJECTION, SOLUTION INFILTRATION; PERINEURAL at 10:44

## 2021-08-10 RX ADMIN — PROPOFOL 100 MCG/KG/MIN: 10 INJECTION, EMULSION INTRAVENOUS at 10:44

## 2021-08-10 RX ADMIN — MIDAZOLAM 2 MG: 1 INJECTION, SOLUTION INTRAMUSCULAR; INTRAVENOUS at 10:41

## 2021-08-10 RX ADMIN — CEFAZOLIN SODIUM 2 G: 1 POWDER, FOR SOLUTION INTRAMUSCULAR; INTRAVENOUS at 10:46

## 2021-08-10 RX ADMIN — FENTANYL CITRATE 25 MCG: 50 INJECTION, SOLUTION INTRAMUSCULAR; INTRAVENOUS at 10:54

## 2021-08-10 RX ADMIN — FENTANYL CITRATE 25 MCG: 50 INJECTION, SOLUTION INTRAMUSCULAR; INTRAVENOUS at 10:42

## 2021-08-10 NOTE — DISCHARGE INSTRUCTIONS
Discharge Instructions from Dr. Susannah Tellez    · You may shower, but no hot tubs, swimming pools, or baths until your incision is healed. · No heavy lifting with the affected extremity (nothing greater than 5 pounds), and limit its use for the next 4-5 days. · You may use an ice pack for comfort for the next couple of days, but do not place ice directly on the skin. Rather, use a towel or clothing to serve as a barrier between skin and ice to prevent injury. · Follow medication instructions carefully. · Watch for signs of infection as listed below. · Redness  · Swelling  · Drainage from the incision or from your nipple that appears infected  · Fever over 101 degrees for consecutive readings, or over 99.5 if you are currently undergoing chemotherapy. · Call our office (number is below) for a follow-up appointment. · If you have any problems, our phone number is [phone]. DISCHARGE SUMMARY from Your Nurse    PATIENT INSTRUCTIONS:    AFTER ANESTHESIA & SEDATION, and WHILE TAKING PAIN MEDICINE  After general anesthesia / intravenous sedation and the 24 hours following, and/or while taking prescription Opiates:  · Limit your activities  · Do not drive and operate hazardous machinery until you have been of all narcotics and sedatives for over 24 hours  · Do not make important personal or business decisions  · Do  not drink alcoholic beverages  · If you have not urinated within 8 hours after discharge, please contact your surgeon on call.         SIGNS OF INFECTION, THINGS TO REPORT TO YOUR DOCTOR  Report the following Signs of Infection or General Problems after surgery to your surgeon:  · Excessive pain, swelling, redness, drainage, pus or odor of or around the surgical area  · Fever/ temperature over 101; Temperature over 100 if on medications (chemotherapy or medicines which affect your ability to fight infections)  · Nausea and vomiting lasting longer than 4 hours or if unable to take medications  · Any signs of decreased circulation or nerve impairment to extremity: change in color, persistent  numbness, tingling, coldness or increase pain  · If you have any questions. GOOD HELP TO FIGHT AN INFECTION  Here are a few tip to help reduce the chance of getting an infection after surgery:   Wash Your Hands   Good handwashing is the most important thing you and your caregiver can do.  Wash before and after caring for any wounds. Dry your hand with a clean towel.  Wash with soap and water for at least 20 seconds. A TIP: sing the \"Happy Birthday\" song through one time while washing to help with the timing.  Use a hand  in between washings.  Shower   When your surgeon says it is OK to take a shower, use a new bar of antibacterial soap (if that is what you use, and keep that bar of soap ONLY for your use), or antibacterial body wash.  Use a clean wash cloth or sponge when you bathe.  Dry off with a clean towel  after every bath - be careful around any wounds, skin staples, sutures or surgical glue over/on wounds.  Do not enter swimming pools, hot tubs, lakes, rivers and/or ocean until wounds are healed and your doctor/surgeon says it is OK.  Use Clean Sheets   Sleep on freshly laundered sheets after your surgery.  Keep the surgery site covered with a clean, dry bandage (if instructed to do so). If the bandage becomes soiled, reapply a new, dry, clean bandage.  Do not allow pets to sleep with you while your wound is healing.  Lifestyle Modification and Controlling Your Blood Sugar   Smoking slows wound healing. Stop smoking and limit exposure to second-hand smoke.  High blood sugar slows wound healing. Eat a well-balanced diet to provide proper nutrition while healing   Monitor your blood sugar (if you are a diabetic) and take your medications as you are suppose to so you can control you blood sugar after surgery.       COUGH AND DEEP BREATHE  Breathing deep and coughing are very important exercises to do after surgery. Deep breathing and coughing open the little air tubes and air sacks in your lungs. You take deep breaths every day. You may not even notice - it is just something you do when you sigh or yawn. It is a natural exercise you do to keep these air passages open. After surgery, take deep breaths and cough, on purpose. Coughing and deep breathing help prevent bronchitis and pneumonia after surgery. If you had chest or belly surgery, use a pillow as a \"hug isabelle\" and hold it tightly to your chest or belly when you cough. DIRECTIONS:  1. Take 10 to 15 slow deep breaths every hour while awake. 2. Breathe in deeply, and hold it for 2 seconds. 3. Exhale slowly through puckered lips, like blowing up a balloon. 4. After every 4th or 5th deep breath, hug your pillow to your chest or belly and give a hard, deep cough. Yes, it will probably hurt if you had abdominal surgery. But doing this exercise is very important part of healing after surgery. Take your pain medicine to help you do this exercise without too much pain. ANKLE PUMPS    Ankle pumps increase the circulation of oxygenated blood to your lower extremities and decrease your risk for circulation problems such as blood clots. They also stretch the muscles, tendons and ligaments in your foot and ankle, and prevent joint contracture in the ankle and foot, especially after surgeries on the legs. It is important to do ankle pump exercises regularly after surgery because immobility increases your risk for developing a blood clot. Your doctor may also have you take an Aspirin for the next few days as well. If your doctor did not ask you to take an Aspirin, consult with him before starting Aspirin therapy on your own. The exercise is quite simple.      · Slowly point your foot forward, feeling the muscles on the top of your lower leg stretch, and hold this position for 5 seconds. · Next, pull your foot back toward you as far as possible, stretching the calf muscles, and hold that position for 5 seconds. · Repeat with the other foot. · Perform 10 repetitions every hour while awake for both ankles if possible (down and then up with the foot once is one repetition). You should feel gentle stretching of the muscles in your lower leg when doing this exercise. If you feel pain, or your range of motion is limited, don't push too hard. Only go the limit your joint and muscles will let you go. If you have increasing pain, progressively worsening leg warmth or swelling, STOP the exercise and call your doctor. Other Wound Care information:  [x] No additional recommendations. Below is information on the medication(s) your doctor is prescribing for you: The maximum daily dose of acetaminophen was discussed with the patient. She was encouraged not to exceed 3,000 mg of acetaminophen during a 24 hour period and was asked to keep in mind that acetaminophen can also be found in many over-the-counter cold medications as well as narcotics that may be given for pain. The patient expresses understanding of these issues and questions were answered. 4 THINGS ABOUT PAIN MEDICINE I ALWAYS TALK ABOUT:  There are 4 side effects I always talk about for pain medications. 1. They make you sleepy and drowsy. Do not drive a car or operate machines while taking pain medication. Do not make any major decisions. Take a nap. Relax. Let your body recover from the affects of anesthesia and surgery. 2. Some people have quite a problem with itching and. ..  3. Nausea and/or vomiting. These are mention together because they are a related genetic issue; while some people experience these problems, others do not. These are expected and know side effects.       Itching is caused by histamine release - practically all opiate medications can cause this. An over-the-counter anti-histamine can help. Over-the-counter Benadryl® (the generic drug name is diphenhydramine) can help, but may cause increased drowsiness which can be intensified by pain medications. Over-the-counter Claritin® (the generic drug name is loratadine) or Zyrtec® (the generic drug name is cetirizine) may be effective without as much drowsiness as with the Benadryl/diphenhydramine. If you have nausea, like the itching, practically all opioids can cause this. Hopefully your surgeon may have given you some medicine for nausea. If your surgeon did not give you anti-nausea medications, and you are experiencing nausea/vomiting that prevent you from drinking clear liquids, CALL HIM/HER and request them, especially if these issues seem to get worse after you leave the hospital.    4. Last but not least is the problem of constipation (not jacinda able to have a bowel movement - poop.)  All pain medicine can slow down the movement of food through the gut. The slower it goes, the worse it can be. This only adds insult to the injury of surgery. And if you had tummy surgery, like having your gall bladder removed or a hernia repair, YOU DO NOT WANT THIS PROBLEM. There are 4 things I recommend. · Drink lots of fluids. For healthy people with no heart problems, this means at least 64 ounces of liquids or more per day. For example, a Big Gulp® from [employer] is 32 ounces. So you need to drink at least 2  Big Gulp®'s of fluids every day. If you have heart problems you may not be able to do this. Talk to your doctor about what you should do to prevent constipation. · Drinking fruit juice like apple, pear, or prune juice gives you extra \"BANG\" for your beverage. These drinks are high in natural fiber.   If you are a diabetic, drink sugar-free fluids with fiber additives (see next 2 points.)  Avoid drinking extra fruit juice unless this is a regular part of your diet plan. · Eat extra fresh fruits and vegetables. · Add extra fiber-products. Fiber products like Metamucil®, Citrucel®, Miralax® or Benefiber® can help. These products are over-the-counter and you do not need a prescription from your doctor. If you have followed these recommendations and still have some difficulty having a good poop, take and over-the-counter stimulant like Dulcolax® (biscodyl)  or Senokot® (senna concentrate). These may help get things moving. [facility] MEDICATION AND   SIDE EFFECT GUIDE    The [facility] MEDICATION AND SIDE EFFECT GUIDE was provided to the PATIENT AND CARE PROVIDER. Information provided includes instruction about drug purpose and common side effects for the following medications:   · none      Medication information added to discharge record on August 10, 2021 at 11:34 AM.      Some information we wish all of our patients to be familiar with and General Information for Healthy Lifestyle choices:    · Make a list of your current medications with your Primary Care Provider. · Update this list whenever your medications are discontinued, doses are changed, or new medications (including over-the-counter products like ibuprofen, vitamins, or herbal remedies) are added. · Carry medication information at all times in case of emergency situations      No smoking / No tobacco products / Avoid exposure to second hand smoke    Surgeon General's Warning:  Quitting smoking now greatly reduces serious risk to your health. Obesity, smoking, and sedentary lifestyle greatly increases your risk for illness. A healthy diet, regular physical exercise & weight monitoring are important for maintaining a healthy lifestyle. A Note About Congestive Heart Failure:   You may be retaining fluid if you have a history of heart failure or if you experience any of the following symptoms:      · Weight gain of 3 pounds or more overnight or 5 pounds in a week  · Increased swelling in our hands or feet  · Shortness of breath while lying flat in bed      Please call your doctor as soon as you notice any of these symptoms; do not wait until your next office visit. A Note About Strokes:  Recognize signs and symptoms of STROKE. The simple mnemonic, F.A.S.T., can help you remember signs of a stroke and what to do if you suspect a stroke is occuring to you or someone you are with:    F - Face looks uneven  A - Arms unable to move, or move evenly  S - Speech is slurred or non-existent  T - Time - CALL 911 as soon as signs and symptoms begin - DO NOT go to bed or wait to see if you get better - TIME IS BRAIN. Warning Signs of HEART ATTACK   Call 911 if you have these symptoms:     Chest discomfort. Most heart attacks involve discomfort in the center of the chest that lasts more than a few minutes, or that goes away and comes back. It can feel like uncomfortable pressure, squeezing, fullness, or pain.  Discomfort in other areas of the upper body. Symptoms can include pain or discomfort in one or both arms, the back, neck, jaw, or stomach.  Shortness of breath with or without chest discomfort.  Other signs may include breaking out in a cold sweat, nausea, or lightheadedness. Don't wait more than five minutes to call 911 - MINUTES MATTER! Fast action can save your life. Calling 911 is almost always the fastest way to get lifesaving treatment. Emergency Medical Services staff can begin treatment when they arrive -- up to an hour sooner than if someone gets to the hospital by car. Learning About Coronavirus [phone])  Coronavirus [phone]): Overview  What is coronavirus (COVID-19)? The coronavirus disease (COVID-19) is caused by a virus. It is an illness that was first found in [place], [place], in December 2019. It has since spread worldwide. The virus can cause fever, cough, and trouble breathing.  In severe cases, it can cause pneumonia and make it hard to breathe without help. It can cause death. Coronaviruses are a large group of viruses. They cause the common cold. They also cause more serious illnesses like Middle East respiratory syndrome (MERS) and severe acute respiratory syndrome (SARS). COVID-19 is caused by a novel coronavirus. That means it's a new type that has not been seen in people before. This virus spreads person-to-person through droplets from coughing and sneezing. It can also spread when you are close to someone who is infected. And it can spread when you touch something that has the virus on it, such as a doorknob or a tabletop. What can you do to protect yourself from coronavirus (COVID-19)? The best way to protect yourself from getting sick is to:  · Avoid areas where there is an outbreak. · Avoid contact with people who may be infected. · Wash your hands often with soap or alcohol-based hand sanitizers. · Avoid crowds and try to stay at least 6 feet away from other people. · Wash your hands often, especially after you cough or sneeze. Use soap and water, and scrub for at least 20 seconds. If soap and water aren't available, use an alcohol-based hand . · Avoid touching your mouth, nose, and eyes. What can you do to avoid spreading the virus to others? To help avoid spreading the virus to others:  · Cover your mouth with a tissue when you cough or sneeze. Then throw the tissue in the trash. · Use a disinfectant to clean things that you touch often. · Stay home if you are sick or have been exposed to the virus. Don't go to school, work, or public areas. And don't use public transportation. · If you are sick:  ? Leave your home only if you need to get medical care. But call the doctor's office first so they know you're coming. And wear a face mask, if you have one.  ? If you have a face mask, wear it whenever you're around other people. It can help stop the spread of the virus when you cough or sneeze. ?  Clean and disinfect your home every day. Use household  and disinfectant wipes or sprays. Take special care to clean things that you grab with your hands. These include doorknobs, remote controls, phones, and handles on your refrigerator and microwave. And don't forget countertops, tabletops, bathrooms, and computer keyboards. When to call for help  Call 911 anytime you think you may need emergency care. For example, call if:  · You have severe trouble breathing. (You can't talk at all.)  · You have constant chest pain or pressure. · You are severely dizzy or lightheaded. · You are confused or can't think clearly. · Your face and lips have a blue color. · You pass out (lose consciousness) or are very hard to wake up. Call your doctor now if you develop symptoms such as:  · Shortness of breath. · Fever. · Cough. If you need to get care, call ahead to the doctor's office for instructions before you go. Make sure you wear a face mask, if you have one, to prevent exposing other people to the virus. Where can you get the latest information? The following health organizations are tracking and studying this virus. Their websites contain the most up-to-date information. Pati Aguirre also learn what to do if you think you may have been exposed to the virus. · U.S. Centers for Disease Control and Prevention (CDC): The CDC provides updated news about the disease and travel advice. The website also tells you how to prevent the spread of infection. www.cdc.gov  · World Health Organization [facility]): WHO offers information about the virus outbreaks. WHO also has travel advice. www.who.int  Current as of: April 1, 2020               Content Version: 12.4  © 6633-3259 [employer].    Care instructions adapted under license by your healthcare professional. If you have questions about a medical condition or this instruction, always ask your healthcare professional. Jorge Wasserman disclaims any warranty or liability for your use of this information. AT THE COMPLETION OF DISCHARGE INSTRUCTION REVIEW, WE VERIFY:  The discharge information has been reviewed with the patient and caregiver. Questions have been asked and answered meeting patient and caregiver expectations. The patient and caregiver verbalized understanding. Your discharge nurse was Esme Machado RN-BC       Board Certified - Pain Management      CONTENTS FOUND IN YOUR DISCHARGE ENVELOPE:  [x]     Surgeon and Hospital Discharge Instructions  [x]     [facility] Surgical Services Care Provider Card  []     Medication & Side Effect Guide            (your newly prescribed medications have been marked/highlighted showing the most common side effects from the classes of drugs on your prescriptions)  []     Medication Prescription(s) x 0 ( [] These have been sent electronically to your pharmacy by your surgeon,   - OR -       your surgeon has already provided these to you during a previous/pre-op office visit)  []     Pain block and/or block with On-Q Catheter from Anesthesia Service (information included in your instructions above)        []    [facility] Education Information  []     Physical Therapy Prescription  []     Follow-up Appointment Cards  []     Surgery-related Pictures/Media  []     Medical device information sheets/pamphlets from their    []     School/work excuse note. []     /parent work excuse note. The following personal items collected during your admission for safe keeping are returned to you:     Dental Appliance: Dental Appliances: Partials  Vi christoph: Visual Aid: Glasses  Hearing Aid:    Jewelry: Jewelry: None  Clothing:    Other Valuables:  Other Valuables: None  Valuables sent to safe:

## 2021-08-10 NOTE — ANESTHESIA PREPROCEDURE EVALUATION
Relevant Problems   PERSONAL HX & FAMILY HX OF CANCER   (+) Bilateral malignant neoplasm of breast in female, estrogen receptor positive (HCC)       Anesthetic History   No history of anesthetic complications            Review of Systems / Medical History  Patient summary reviewed and nursing notes reviewed    Pulmonary          Shortness of breath  Asthma : well controlled    Comments: Covid-19 infection 2/21   Neuro/Psych   Within defined limits           Cardiovascular    Hypertension: well controlled          Hyperlipidemia    Exercise tolerance: >4 METS     GI/Hepatic/Renal     GERD: well controlled          Comments: S/P Lv fundoplication Endo/Other        Cancer    Comments: Bilateral breast cancer Other Findings              Physical Exam    Airway  Mallampati: II    Neck ROM: normal range of motion   Mouth opening: Normal     Cardiovascular    Rhythm: regular  Rate: normal         Dental    Dentition: Lower partial plate     Pulmonary  Breath sounds clear to auscultation               Abdominal         Other Findings            Anesthetic Plan    ASA: 2  Anesthesia type: MAC            Anesthetic plan and risks discussed with: Patient      Informed consent obtained.

## 2021-08-10 NOTE — H&P
HISTORY OF PRESENT ILLNESS  Jayla Donahue is a 61 y.o. female. HPI  ESTABLISHED patient here for post op visit s/p bilateral breast lumpectomies and bilateral SLNB. She is having some pain in the LEFT breast and feels \"pulling\" in RIGHT breast.     05/24/21: RIGHT breast bx, 9:00. PATH: IDC, 0.7cm, histologic grade 1-2, ER+(100%)/LA+(95%)/HER2-, Ki-67 <5%. Clinical stage 1.     06/16/21: LEFT breast MRI-guided biopsies. PATH:  · Site A, posterior: Invasive and in situ mammary carcinoma, favor lobular, 6mm, overall grade 1, ER+(99%)/LA+(60%)/HER2-, Ki-67 19%. · Site B, anterior: Invasive and in situ mammary carcinoma, favor lobular, 11mm, overall grade 1, ER+(99%)/LA-, HER2- by FISH, Ki-67 18%. MammaPrint: Low risk, luminal type A, +0.309. · Clinical stage 1.     06/29/21: BL lumpectomies and BL SLNBx. PATH:  · RIGHT: IDC, 25 x 10 x 10, overall grade 1, negative margins, 0/7 LNs invovled. DCIS, 2 x 1mm, negative margins. Fibroadenoma. Pathologic stage: pT2, pN0. MammaPrint pending. · LEFT: Multifocal ILC (x2), 18 x 15 x 10mm and 15 x 14 x 10, overall grade 1, negative margins, 0/8 LNs involved. LCIS.  Pathologic stage: pT1c, pN0.     MammaPrint pending for RIGHT breast.             Past Medical History:   Diagnosis Date    Asthma      Cancer ([facility] [address] 06/2021     bilateral breast cancer    COVID-19 02/2021    GERD (gastroesophageal reflux disease)      Hyperlipemia      Hypertension                 Past Surgical History:   Procedure Laterality Date    HX BREAST LUMPECTOMY Bilateral 6/29/2021     LEFT BREAST LUMPECTOMY WITH BRACKETED NEEDLE LOCALIZATION, LEFT BREAST SENTINEL NODE BIOPSY, RIGHT BREAST LUMPECTOMY WITH ULTRASOUND, RIGHT BREAST SENTINEL NODE BIOPSY performed by Moody Arroyo MD at [place] [address] HX ORTHOPAEDIC Left 2016     parital left knee arthroplasty    LA ABDOMEN SURGERY PROC UNLISTED   2006     lap nissen         Social History            Socioeconomic History    Marital status:        Spouse name: Not on file    Number of children: Not on file    Years of education: Not on file    Highest education level: Not on file   Occupational History    Not on file   Tobacco Use    Smoking status: Never Smoker    Smokeless tobacco: Never Used   Vaping Use    Vaping Use: Never used   Substance and Sexual Activity    Alcohol use: Not Currently       Comment:  2 drinks per year    Drug use: No    Sexual activity: Not on file   Other Topics Concern    Not on file   Social History Narrative    Not on file      Social Determinants of Health          Financial Resource Strain:     Difficulty of Paying Living Expenses:    Food Insecurity:     Worried About Running Out of Food in the Last Year:     [address] in the Last Year:    Transportation Needs:     Lack of Transportation (Medical):      Lack of Transportation (Non-Medical):    Physical Activity:     Days of Exercise per Week:     Minutes of Exercise per Session:    Stress:     Feeling of Stress :    Social Connections:     Frequency of Communication with Friends and Family:     Frequency of Social Gatherings with Friends and Family:     Attends Gnosticism Services:     Active Member of Clubs or Organizations:     Attends Club or Organization Meetings:     Marital Status:    Intimate Partner Violence:     Fear of Current or Ex-Partner:     Emotionally Abused:     Physically Abused:     Sexually Abused:                 Current Outpatient Medications on File Prior to Visit   Medication Sig Dispense Refill    rosuvastatin (CRESTOR) 20 mg tablet Take 20 mg by mouth daily.        cholecalciferol, vitamin D3, (Vitamin D3) 50 mcg (2,000 unit) tab Take 1 Tablet by mouth daily.        omeprazole (PRILOSEC) 20 mg capsule Take 20 mg by mouth daily.        celecoxib (CELEBREX) 200 mg capsule TAKE 1 CAPSULE BY MOUTH ONCE DAILY WITH FOOD FOR 30 DAYS        lisinopril-hydroCHLOROthiazide (Chani Chris, ZESTORETIC) 20-25 mg per tablet Take 1 Tablet by mouth daily.        traMADoL (ULTRAM) 50 mg tablet Take 50 mg by mouth every six (6) hours as needed for Pain.          No current facility-administered medications on file prior to visit.         No Known Allergies     OB History    No obstetric history on file.       Obstetric Comments   Menarche:  9. LMP: 2014. # of Children:  4. Age at Delivery of First Child:  13.   Hysterectomy/oophorectomy:  NO/?.  Breast Bx:  no.  Hx of Breast Feeding:  ? Anna Diggs BCP:  ? . Hormone therapy:  no.                       ROS     Physical Exam  Exam conducted with a chaperone present. Cardiovascular:      Rate and Rhythm: Normal rate and regular rhythm. Heart sounds: Normal heart sounds. Pulmonary:      Breath sounds: Normal breath sounds. Chest:      Breasts: Breasts are symmetrical.         Right: Normal. No swelling, bleeding, inverted nipple, mass, nipple discharge, skin change or tenderness. Left: Normal. No swelling, bleeding, inverted nipple, mass, nipple discharge, skin change or tenderness. Lymphadenopathy:      Cervical:      Right cervical: No superficial, deep or posterior cervical adenopathy. Left cervical: No superficial, deep or posterior cervical adenopathy. Upper Body:      Right upper body: No supraclavicular or axillary adenopathy. Left upper body: No supraclavicular or axillary adenopathy.       ASSESSMENT and PLAN      ICD-10-CM ICD-9-CM     1. Bilateral malignant neoplasm of breast in female, estrogen receptor positive, unspecified site of breast ([facility]  C50.911 174. 9       Z17.0 V86.0       C50.912          Patient presents for f/u s/p BL lumpectomies and BL SLNBx on 06/29/21, and is doing well overall. Well healed incisions bilaterally, no evidence of local recurrence. Reviewed low risk MammaPrint results on the LEFT.  MammaPrint still pending on the RIGHT, will call with results when available     Pt may begin to resume her normal activities and swimming. She may return to work in 3 weeks and may do heavy lifting there as usual. Also addressed questions about post-op immunity; pt may have changes in immunity with chemo, but is at normal immunity now after surgery.     Pt to see medical oncology and radiation oncology for further consultation for adjuvant treatments. F/U in 6 months with Armando Campbell NP. This plan was reviewed with the patient and patient agrees. All questions were answered.

## 2021-08-10 NOTE — PERIOP NOTES
POST ANESTHESIA CARE    DISCHARGE / TRANSFER NOTE    Geo Ventura was   discharged    via     wheel chair     to  private vehicle    . Patient was escorted by   nurse     . Patient verbalized   appreciation and was very pleased with care received    throughout their stay. Patient was discharged in     pleasant mood  . Pain at discharge/transfer was      0  /10. Discharge, medication and follow-up instructions were verbalized as understood prior to discharge  (if applicable for same-day procedures being discharged.)    All personal belongings have been returned to patient, and patient/family verbally confirm receiving belongings as all present.     Signed: Raffaele BENAVIDESN RN-BC

## 2021-08-10 NOTE — PERIOP NOTES
PACU IN REPORT FROM ANESTHESIA    Verbal report received from   [facility]   [] MD/DO-Anesthesiologist    [x] CRNA   [] with student    CHOICE ANESTHESIA:  [] GENERAL  [] TIVA  [x] MAC  [x] LOCAL  [] REGIONAL  [] SPINAL   [] EPIDURAL   **Note the anesthesia record for medications given intraoperatively. **           [] E.R.A.S. PROTOCOL    SURGICAL PROCEDURE: Procedure(s) (LRB):  PORT A CATH PLACEMENT (N/A)     SURGEON: Jennifer Patel MD.    Brief Initial Visual Assessment:    Patient Age: [] Infant(1-12mo)      []Pediatric(1-13yrs)    [] Adolescent(13-18yrs)    [x] Adult(18-65yrs)      []Geriatric Adult(>65yrs). Patient    [x] Alert           [x]Calm & Cooperative      [] Anxious  Appearance: [x] Drowsy      [] Sedated      [] Unresponsive     Oriented x  1            Airway:     [x] Patent                          [] Obstructs easily/Obstructed on arrival   [] \"Difficult Airway\" report by Anesthesia                        [] Airway improved with head/airway repositioning                       Airway Adjuncts Present: [] Oral Airway    [] Nasal Trumpet    [] ETT    [] LMA            Respiratory  [x] Even   [] Labored   [] Shallow   [] Tachypnea   [] Bradypnea  Pattern:    [x] Non-Labored  [] VENT and/or respiratory assistance     being provided. Skin:     [x] Pink [] Dusky    [] Pale        [x] Warm    [] Hot [] Cool       [] Cold   [x]Dry [] Moist [] Diaphoretic     Membranes:  [x] Pink [] Pale       [x] Moist [] Dry     [] Crusty     Pain:   [x] No Acute Discomfort. 0  /10 Scale [x] Verbal Numeric   [] Moderate Discomfort.      [] V.A.S. [] Acute Discomfort.                [] A.N.V.    [] Chronic-Issue Related Discomfort.   [] F.L.A.C.C. Note E-MAR for medications administered. []Faces, Camacho/Baker    Note assessments documented in flowsheets; any assessment variants to be found in comments or narrative perioperative nurse notes.        Post-anesthesia care now assumed by Anya Haley Arti BSN, RN-BC

## 2021-08-10 NOTE — ANESTHESIA POSTPROCEDURE EVALUATION
Procedure(s):  PORT A CATH PLACEMENT.     MAC    Anesthesia Post Evaluation      Multimodal analgesia: multimodal analgesia not used between 6 hours prior to anesthesia start to PACU discharge  Patient location during evaluation: PACU  Patient participation: complete - patient participated  Level of consciousness: awake  Pain management: adequate  Airway patency: patent  Anesthetic complications: no  Cardiovascular status: acceptable, blood pressure returned to baseline and hemodynamically stable  Respiratory status: acceptable  Hydration status: acceptable  Post anesthesia nausea and vomiting:  controlled      INITIAL Post-op Vital signs:   Vitals Value Taken Time   /75 08/10/21 1140   Temp 36.7 °C (98 °F) 08/10/21 1129   Pulse 83 08/10/21 1140   Resp 21 08/10/21 1140   SpO2 97 % 08/10/21 1140

## 2021-08-10 NOTE — OP NOTES
[facility]  OPERATIVE REPORT    Name:  Radha Cabrera  MR#:  [id number]  :  1960  ACCOUNT #:  [de-identified]  DATE OF SERVICE:  08/10/2021    PREOPERATIVE DIAGNOSIS:  Carcinoma of bilateral breasts with need for neoadjuvant chemotherapy. POSTOPERATIVE DIAGNOSIS:  Carcinoma of bilateral breasts with need for neoadjuvant chemotherapy. PROCEDURE PERFORMED:  Venous Port-A-Cath insertion. SURGEON:  Smitha Zamorano MD    ASSISTANT:  Balaji Strong    ANESTHESIA:  Local standby. COMPLICATIONS:  None. SPECIMENS REMOVED:  None. IMPLANTS:  8-Italian PowerPort, left subclavian vein. ESTIMATED BLOOD LOSS:  Minimal.    INDICATIONS:  The patient is a 27-year-old female with bilateral breast carcinoma who is on the side of being high-risk. PROCEDURE:  After adequate IV sedation, sterile prep and drape and local anesthesia with 1% lidocaine mixed with 0.5% Marcaine, the left subclavian vein was cannulated on the first pass. The wire was passed into the superior vena cava and position confirmed with fluoroscopy. An anterior chest wall pocket was made and an 8-Italian PowerPort was tunneled from the chest wall pocket to the original stick site and cut to length. Using a combination of dilator and breakaway sheath, the catheter was placed into the superior vena cava and position again confirmed with fluoroscopy. The catheter aspirated easily, was flushed with heparinized saline and a final Hep-Lock flush. The stick site was closed with a single U-stitch of 4-0 Monocryl and the skin was closed with interrupted 3-0 Vicryl and running subcuticular 4-0 Monocryl on the skin. The patient tolerated the procedure well with no immediate complications. She was taken to the recovery room in stable condition.       Calli Lantigua MD      [id number]  D:  08/10/2021 15:09  T:  08/10/2021 19:57  JOB #:  [id number]  CC:  MD Dmitriy Simms MD

## 2021-08-11 ENCOUNTER — TELEPHONE (OUTPATIENT)
Dept: ONCOLOGY | Age: 61
End: 2021-08-11

## 2021-08-11 NOTE — TELEPHONE ENCOUNTER
08/11/2021--This user called and spoke with the patient, I let her know that we have not received any [employer] paperwork and she stated that she had her copies so she would bring those in to us on Friday, I let her know that Ayla Bryant was not in the office this week, which patient knew, so that he would not be able to sign until next week. Patient stated that was fine. Patient also had a question about the cold caps, which I asked other nurse Imani Mendoza RN about and was able to let patient know, also let patient know about the [employer] website and their videos and how patient's have said they were helpful, patient stated that she would look them up and denied any further question's or concerns at that time.

## 2021-08-11 NOTE — TELEPHONE ENCOUNTER
Patient called and stated that she would like to know if Luminal papers have been received. Also she has questions regarding her upcoming appt on the 17th. Please advise.       CB# [phone]

## 2021-08-13 ENCOUNTER — NURSE NAVIGATOR (OUTPATIENT)
Dept: CASE MANAGEMENT | Age: 61
End: 2021-08-13

## 2021-08-13 NOTE — NURSE NAVIGATOR
[address]  Breast Navigator Encounter    Name: Lit Cisneros  Age: 61 y.o.  : 1960  Diagnosis: Right breast IDC; ER+/MA+/HER2-;  Stage IIA; High risk mammaprint    Encounter type:  []Patient Initiated  []Navigator Follow-up []Pre-op  []Post-op  [x]Check-in Prior to First Treatment []Treatment Modality Change  []Survivorship Transition []Other:     Narrative:   Called pt to assess for questions/concerns prior to starting chemo next week. Answered questions about prescribed medications and Cold caps. Also discussed process for port access and labs prior to treatment start. Pt verbalizes understanding. No additional concerns at this time. Pt encouraged to reach out as needed. Will follow-up.        Interdisciplinary Team:  Med-Onc: Dr. Karyna Godfrey MD  Surg-Onc: Dr. Yehuda Fleming  Rad-Onc: Dr. Tae Carrera MD  Plastics:  : Elke Bruno LCSW  Nurse Navigator:  LUKE Hurst, RN, LUKE Myles, RN, [facility]  Oncology Breast Navigator    [address]  [address] [place] [address]  W: [phone]  F: [phone]  [email or web address]   Good Help to Those in [facility]

## 2021-08-16 NOTE — PROGRESS NOTES
Lex Jarrett is a 61 y.o. female here for follow up for B/L breast cancer. Treatment today:  Cycle 1 Day 1 TC    1. Have you been to the ER, urgent care clinic since your last visit? Hospitalized since your last visit? no    2. Have you seen or consulted any other health care providers outside of the [address] [place] [address] since your last visit? Include any pap smears or colon screening. no    Pt states she is doing ok today. No complaints.

## 2021-08-17 ENCOUNTER — OFFICE VISIT (OUTPATIENT)
Dept: ONCOLOGY | Age: 61
End: 2021-08-17
Payer: COMMERCIAL

## 2021-08-17 ENCOUNTER — DOCUMENTATION ONLY (OUTPATIENT)
Dept: ONCOLOGY | Age: 61
End: 2021-08-17

## 2021-08-17 ENCOUNTER — HOSPITAL ENCOUNTER (OUTPATIENT)
Dept: INFUSION THERAPY | Age: 61
Discharge: HOME | End: 2021-08-17
Payer: COMMERCIAL

## 2021-08-17 ENCOUNTER — TELEPHONE (OUTPATIENT)
Dept: ONCOLOGY | Age: 61
End: 2021-08-17

## 2021-08-17 ENCOUNTER — RESEARCH ENCOUNTER (OUTPATIENT)
Dept: ONCOLOGY | Age: 61
End: 2021-08-17

## 2021-08-17 VITALS
HEIGHT: 60 IN | BODY MASS INDEX: 36.6 KG/M2 | DIASTOLIC BLOOD PRESSURE: 65 MMHG | WEIGHT: 186.4 LBS | TEMPERATURE: 97.2 F | SYSTOLIC BLOOD PRESSURE: 139 MMHG | OXYGEN SATURATION: 99 % | RESPIRATION RATE: 16 BRPM | HEART RATE: 67 BPM

## 2021-08-17 VITALS
SYSTOLIC BLOOD PRESSURE: 132 MMHG | BODY MASS INDEX: 36.52 KG/M2 | HEIGHT: 60 IN | RESPIRATION RATE: 16 BRPM | OXYGEN SATURATION: 99 % | WEIGHT: 186 LBS | TEMPERATURE: 99 F | DIASTOLIC BLOOD PRESSURE: 86 MMHG | HEART RATE: 95 BPM

## 2021-08-17 DIAGNOSIS — C50.811 MALIGNANT NEOPLASM OF OVERLAPPING SITES OF BOTH BREASTS IN FEMALE, ESTROGEN RECEPTOR POSITIVE (HCC): Primary | ICD-10-CM

## 2021-08-17 DIAGNOSIS — Z17.0 MALIGNANT NEOPLASM OF OVERLAPPING SITES OF BOTH BREASTS IN FEMALE, ESTROGEN RECEPTOR POSITIVE (HCC): Primary | ICD-10-CM

## 2021-08-17 DIAGNOSIS — C50.812 MALIGNANT NEOPLASM OF OVERLAPPING SITES OF BOTH BREASTS IN FEMALE, ESTROGEN RECEPTOR POSITIVE (HCC): Primary | ICD-10-CM

## 2021-08-17 LAB
ALBUMIN SERPL-MCNC: 3.9 G/DL (ref 3.5–5)
ALBUMIN/GLOB SERPL: 1 {RATIO} (ref 1.1–2.2)
ALP SERPL-CCNC: 90 U/L (ref 45–117)
ALT SERPL-CCNC: 26 U/L (ref 12–78)
ANION GAP SERPL CALC-SCNC: 7 MMOL/L (ref 5–15)
AST SERPL-CCNC: 16 U/L (ref 15–37)
BASOPHILS # BLD: 0 K/UL (ref 0–0.1)
BASOPHILS NFR BLD: 0 % (ref 0–1)
BILIRUB SERPL-MCNC: 0.6 MG/DL (ref 0.2–1)
BUN SERPL-MCNC: 21 MG/DL (ref 6–20)
BUN/CREAT SERPL: 26 (ref 12–20)
CALCIUM SERPL-MCNC: 9.6 MG/DL (ref 8.5–10.1)
CHLORIDE SERPL-SCNC: 107 MMOL/L (ref 97–108)
CO2 SERPL-SCNC: 25 MMOL/L (ref 21–32)
CREAT SERPL-MCNC: 0.8 MG/DL (ref 0.55–1.02)
DIFFERENTIAL METHOD BLD: ABNORMAL
EOSINOPHIL # BLD: 0 K/UL (ref 0–0.4)
EOSINOPHIL NFR BLD: 0 % (ref 0–7)
ERYTHROCYTE [DISTWIDTH] IN BLOOD BY AUTOMATED COUNT: 13.7 % (ref 11.5–14.5)
GLOBULIN SER CALC-MCNC: 3.8 G/DL (ref 2–4)
GLUCOSE SERPL-MCNC: 142 MG/DL (ref 65–100)
HCT VFR BLD AUTO: 41.8 % (ref 35–47)
HGB BLD-MCNC: 13.7 G/DL (ref 11.5–16)
IMM GRANULOCYTES # BLD AUTO: 0.1 K/UL (ref 0–0.04)
IMM GRANULOCYTES NFR BLD AUTO: 1 % (ref 0–0.5)
LYMPHOCYTES # BLD: 1.2 K/UL (ref 0.8–3.5)
LYMPHOCYTES NFR BLD: 11 % (ref 12–49)
MCH RBC QN AUTO: 28.8 PG (ref 26–34)
MCHC RBC AUTO-ENTMCNC: 32.8 G/DL (ref 30–36.5)
MCV RBC AUTO: 88 FL (ref 80–99)
MONOCYTES # BLD: 0.6 K/UL (ref 0–1)
MONOCYTES NFR BLD: 5 % (ref 5–13)
NEUTS SEG # BLD: 9.4 K/UL (ref 1.8–8)
NEUTS SEG NFR BLD: 83 % (ref 32–75)
NRBC # BLD: 0 K/UL (ref 0–0.01)
NRBC BLD-RTO: 0 PER 100 WBC
PLATELET # BLD AUTO: 309 K/UL (ref 150–400)
PMV BLD AUTO: 9.4 FL (ref 8.9–12.9)
POTASSIUM SERPL-SCNC: 3.4 MMOL/L (ref 3.5–5.1)
PROT SERPL-MCNC: 7.7 G/DL (ref 6.4–8.2)
RBC # BLD AUTO: 4.75 M/UL (ref 3.8–5.2)
SODIUM SERPL-SCNC: 139 MMOL/L (ref 136–145)
WBC # BLD AUTO: 11.3 K/UL (ref 3.6–11)

## 2021-08-17 PROCEDURE — 99215 OFFICE O/P EST HI 40 MIN: CPT | Performed by: INTERNAL MEDICINE

## 2021-08-17 PROCEDURE — 96413 CHEMO IV INFUSION 1 HR: CPT

## 2021-08-17 PROCEDURE — 74011250636 HC RX REV CODE- 250/636: Performed by: INTERNAL MEDICINE

## 2021-08-17 PROCEDURE — 36415 COLL VENOUS BLD VENIPUNCTURE: CPT

## 2021-08-17 PROCEDURE — 96417 CHEMO IV INFUS EACH ADDL SEQ: CPT

## 2021-08-17 PROCEDURE — 85025 COMPLETE CBC W/AUTO DIFF WBC: CPT

## 2021-08-17 PROCEDURE — 74011250637 HC RX REV CODE- 250/637: Performed by: NURSE PRACTITIONER

## 2021-08-17 PROCEDURE — 77030016057 HC NDL HUBR APOL -B

## 2021-08-17 PROCEDURE — 96377 APPLICATON ON-BODY INJECTOR: CPT

## 2021-08-17 PROCEDURE — 80053 COMPREHEN METABOLIC PANEL: CPT

## 2021-08-17 PROCEDURE — 96375 TX/PRO/DX INJ NEW DRUG ADDON: CPT

## 2021-08-17 PROCEDURE — 96360 HYDRATION IV INFUSION INIT: CPT

## 2021-08-17 RX ORDER — ONDANSETRON 2 MG/ML
8 INJECTION INTRAMUSCULAR; INTRAVENOUS AS NEEDED
Status: ACTIVE | OUTPATIENT
Start: 2021-08-17 | End: 2021-08-17

## 2021-08-17 RX ORDER — ACETAMINOPHEN 325 MG/1
650 TABLET ORAL AS NEEDED
Status: ACTIVE | OUTPATIENT
Start: 2021-08-17 | End: 2021-08-17

## 2021-08-17 RX ORDER — PALONOSETRON 0.05 MG/ML
0.25 INJECTION, SOLUTION INTRAVENOUS ONCE
Status: COMPLETED | OUTPATIENT
Start: 2021-08-17 | End: 2021-08-17

## 2021-08-17 RX ORDER — DIPHENHYDRAMINE HYDROCHLORIDE 50 MG/ML
50 INJECTION, SOLUTION INTRAMUSCULAR; INTRAVENOUS AS NEEDED
Status: ACTIVE | OUTPATIENT
Start: 2021-08-17 | End: 2021-08-17

## 2021-08-17 RX ORDER — SODIUM CHLORIDE 9 MG/ML
25 INJECTION, SOLUTION INTRAVENOUS CONTINUOUS
Status: DISPENSED | OUTPATIENT
Start: 2021-08-17 | End: 2021-08-17

## 2021-08-17 RX ORDER — ALBUTEROL SULFATE 0.83 MG/ML
2.5 SOLUTION RESPIRATORY (INHALATION) AS NEEDED
Status: ACTIVE | OUTPATIENT
Start: 2021-08-17 | End: 2021-08-17

## 2021-08-17 RX ORDER — EPINEPHRINE 1 MG/ML
0.3 INJECTION, SOLUTION, CONCENTRATE INTRAVENOUS AS NEEDED
Status: ACTIVE | OUTPATIENT
Start: 2021-08-17 | End: 2021-08-17

## 2021-08-17 RX ORDER — HYDROCORTISONE SODIUM SUCCINATE 100 MG/2ML
100 INJECTION, POWDER, FOR SOLUTION INTRAMUSCULAR; INTRAVENOUS AS NEEDED
Status: ACTIVE | OUTPATIENT
Start: 2021-08-17 | End: 2021-08-17

## 2021-08-17 RX ORDER — SODIUM CHLORIDE 9 MG/ML
10 INJECTION INTRAMUSCULAR; INTRAVENOUS; SUBCUTANEOUS AS NEEDED
Status: ACTIVE | OUTPATIENT
Start: 2021-08-17 | End: 2021-08-17

## 2021-08-17 RX ORDER — HEPARIN 100 UNIT/ML
300-500 SYRINGE INTRAVENOUS AS NEEDED
Status: ACTIVE | OUTPATIENT
Start: 2021-08-17 | End: 2021-08-17

## 2021-08-17 RX ORDER — POTASSIUM CHLORIDE 750 MG/1
40 TABLET, FILM COATED, EXTENDED RELEASE ORAL
Status: COMPLETED | OUTPATIENT
Start: 2021-08-17 | End: 2021-08-17

## 2021-08-17 RX ORDER — DIPHENHYDRAMINE HYDROCHLORIDE 50 MG/ML
25 INJECTION, SOLUTION INTRAMUSCULAR; INTRAVENOUS AS NEEDED
Status: ACTIVE | OUTPATIENT
Start: 2021-08-17 | End: 2021-08-17

## 2021-08-17 RX ORDER — DEXAMETHASONE SODIUM PHOSPHATE 100 MG/10ML
10 INJECTION INTRAMUSCULAR; INTRAVENOUS ONCE
Status: COMPLETED | OUTPATIENT
Start: 2021-08-17 | End: 2021-08-17

## 2021-08-17 RX ORDER — SODIUM CHLORIDE 0.9 % (FLUSH) 0.9 %
10 SYRINGE (ML) INJECTION AS NEEDED
Status: DISPENSED | OUTPATIENT
Start: 2021-08-17 | End: 2021-08-17

## 2021-08-17 RX ADMIN — POTASSIUM CHLORIDE 40 MEQ: 750 TABLET, FILM COATED, EXTENDED RELEASE ORAL at 12:02

## 2021-08-17 RX ADMIN — DEXAMETHASONE SODIUM PHOSPHATE 10 MG: 10 INJECTION INTRAMUSCULAR; INTRAVENOUS at 11:42

## 2021-08-17 RX ADMIN — PALONOSETRON HYDROCHLORIDE 0.25 MG: 0.25 INJECTION, SOLUTION INTRAVENOUS at 11:39

## 2021-08-17 RX ADMIN — SODIUM CHLORIDE 25 ML/HR: 9 INJECTION, SOLUTION INTRAVENOUS at 11:34

## 2021-08-17 RX ADMIN — SODIUM CHLORIDE 500 ML: 9 INJECTION, SOLUTION INTRAVENOUS at 11:49

## 2021-08-17 RX ADMIN — CYCLOPHOSPHAMIDE 1146 MG: 500 INJECTION, POWDER, FOR SOLUTION INTRAVENOUS; ORAL at 13:56

## 2021-08-17 RX ADMIN — Medication 500 UNITS: at 15:39

## 2021-08-17 RX ADMIN — DOCETAXEL ANHYDROUS 143 MG: 10 INJECTION, SOLUTION INTRAVENOUS at 12:42

## 2021-08-17 RX ADMIN — SODIUM CHLORIDE 10 ML: 9 INJECTION INTRAMUSCULAR; INTRAVENOUS; SUBCUTANEOUS at 15:38

## 2021-08-17 RX ADMIN — PEGFILGRASTIM 6 MG: KIT SUBCUTANEOUS at 15:23

## 2021-08-17 NOTE — PROGRESS NOTES
[facility] [place] at [address], [address] [facility]  W: [phone]  F: [phone]      Reason for Visit:   Annalee Summers is a 61 y.o. female who is seen in consultation at the request of Dr. Dontrell Louis for evaluation of therapy for breast cancer    Rad onc:  Dr. Roxy Baptiste    Treatment History:   · 5/24/21 right breast mass core bx:  IDC, 7 mm, gr 1-2, no LVI, ER + at 100%, MA + at 95%, HER 2 negative at [facility] 1+, ki67 < 5%  · MRI bx, 6/16/21 breast left site A, posterior bx: ILC, gr 1, 6 mm, ER + at 99%, MA + at 60%, HER 2 negative at IHC 1+, ki67 19%; left breast site B, anterior bx:  ILC, gr 1, 1.1 cm, ER + at 99%, MA negative, HER 2 negative (IHC 2+; FISH ratio 1.1 ; sig/cell 2); ki67 18%  · 6/29/21 right breast lumpectomy:  IDC, 2.5 cm, gr 1, DCIS, gr 2, focal 2 mm, 0/7 LN, pT2 pN0 cM0; left breast lumpectomy:  Multifocal ILC, gr 1, 1.8 cm and 1.5 cm, 0/8 LN, pT1c pN0 cM0  · Mammaprint, right breast, high-risk, luminal type B  · Mammaprint, left breast, site B, low-risk, luminal type A  · TC 8/17/21-    History of Present Illness:    An abnormal right mammogram led to the pathology above    Interval history:  Gr 1 fatigue, gr 1 cough, gr 1 sob, gr 1 headache    FH:  Father with lung cancer; no breast; paternal aunt with ovarian cancer, no prostate or pancreas cancer    Past Medical History:   Diagnosis Date    Asthma     Cancer ([address] 06/2021    bilateral breast cancer    COVID-19 02/2021    GERD (gastroesophageal reflux disease)     Hyperlipemia     Hypertension       Past Surgical History:   Procedure Laterality Date    HX BREAST LUMPECTOMY Bilateral 6/29/2021    LEFT BREAST LUMPECTOMY WITH BRACKETED NEEDLE LOCALIZATION, LEFT BREAST SENTINEL NODE BIOPSY, RIGHT BREAST LUMPECTOMY WITH ULTRASOUND, RIGHT BREAST SENTINEL NODE BIOPSY performed by Chip Paul MD at [address] HX ORTHOPAEDIC Left 2016    parital left knee arthroplasty    CT ABDOMEN SURGERY PROC UNLISTED  2006    lap nissen      Social History     Tobacco Use    Smoking status: Never Smoker    Smokeless tobacco: Never Used   Substance Use Topics    Alcohol use: Not Currently     Comment:  2 drinks per year      Family History   Problem Relation Age of Onset    Lung Cancer Father      Current Outpatient Medications   Medication Sig    lidocaine-prilocaine (EMLA) topical cream Apply  to affected area as needed for Pain.  ondansetron hcl (ZOFRAN) 8 mg tablet Take 1 Tablet by mouth every eight (8) hours as needed for Nausea.  dexAMETHasone (DECADRON) 4 mg tablet 8 mg PO bid the day before and day after chemo    rosuvastatin (CRESTOR) 20 mg tablet Take 20 mg by mouth daily.  cholecalciferol, vitamin D3, (Vitamin D3) 50 mcg (2,000 unit) tab Take 1 Tablet by mouth daily.  omeprazole (PRILOSEC) 20 mg capsule Take 20 mg by mouth daily.  celecoxib (CELEBREX) 200 mg capsule TAKE 1 CAPSULE BY MOUTH ONCE DAILY WITH FOOD FOR 30 DAYS    lisinopril-hydroCHLOROthiazide (PRINZIDE, ZESTORETIC) 20-25 mg per tablet Take 1 Tablet by mouth daily.  traMADoL (ULTRAM) 50 mg tablet Take 50 mg by mouth every six (6) hours as needed for Pain.  prochlorperazine (COMPAZINE) 10 mg tablet Take 1 Tablet by mouth every six (6) hours as needed for Nausea. (Patient not taking: Reported on 8/10/2021)     No current facility-administered medications for this visit. No Known Allergies     Review of Systems: A complete review of systems was obtained, negative except as described above.     Physical Exam:     Visit Vitals  /86   Pulse 95   Temp 99 °F (37.2 °C)   Resp 16   Ht 5' (1.524 m)   Wt 186 lb (84.4 kg)   SpO2 99%   BMI 36.33 kg/m²     ECOG PS: 0    Constitutional: Appears well-developed and well-nourished in no apparent distress      Mental status: Alert and awake, Oriented to person/place/time, Able to follow commands    Eyes: EOM normal, Sclera normal, No visible ocular discharge    HENT: Normocephalic, atraumatic    Mouth/Throat: Moist mucous membranes    External Ears normal    Neck: No visualized mass    Pulmonary/Chest: Respiratory effort normal, No visualized signs of difficulty breathing or respiratory distress    Musculoskeletal: Normal gait with no signs of ataxia, Normal range of motion of neck    Neurological: No facial asymmetry (Cranial nerve 7 motor function), No gaze palsy    Skin: No significant exanthematous lesions or discoloration noted on facial skin    Psychiatric: Normal affect, No hallucinations               Results:     Lab Results   Component Value Date/Time    WBC 10.2 02/27/2020 07:08 PM    WBC 10.1 02/27/2020 07:08 PM    HGB 14.4 02/27/2020 07:08 PM    HGB 14.4 02/27/2020 07:08 PM    HCT 42.9 02/27/2020 07:08 PM    HCT 43.0 02/27/2020 07:08 PM    PLATELET 792 48/22/9538 07:08 PM    PLATELET 759 33/03/3103 07:08 PM    MCV 88.5 02/27/2020 07:08 PM    MCV 88.7 02/27/2020 07:08 PM    ABS. NEUTROPHILS 5.3 02/27/2020 07:08 PM    ABS. NEUTROPHILS 5.3 02/27/2020 07:08 PM     Lab Results   Component Value Date/Time    Sodium 140 06/25/2021 12:08 PM    Potassium 4.6 06/25/2021 12:08 PM    Chloride 108 06/25/2021 12:08 PM    CO2 25 06/25/2021 12:08 PM    Glucose 86 06/25/2021 12:08 PM    BUN 16 06/25/2021 12:08 PM    Creatinine 0.53 (L) 06/25/2021 12:08 PM    GFR est AA >60 06/25/2021 12:08 PM    GFR est non-AA >60 06/25/2021 12:08 PM    Calcium 9.4 06/25/2021 12:08 PM     Lab Results   Component Value Date/Time    Bilirubin, total 0.4 02/27/2020 07:08 PM    ALT (SGPT) 27 02/27/2020 07:08 PM    Alk.  phosphatase 82 02/27/2020 07:08 PM    Protein, total 7.2 02/27/2020 07:08 PM    Albumin 4.2 02/27/2020 07:08 PM    Globulin 3.0 02/27/2020 07:08 PM     6/8/21 MRI breast  FINDINGS:      Background enhancement: Minimal.     Right Breast: At about the 8:00 position and middle depth of the right breast  there is a 5 x 6 x 8 mm enhancing nodule adjacent to the posterior aspect of  HydroMark clip signal void. There are no other enhancing lesions to suggest any  additional sites of invasive breast carcinoma.     Left Breast: There is a 7 x 9 x 11 mm mass with washout kinetics in the 12:00  position of the central left breast near the junction of the posterior middle  thirds. Slightly superior to this at 12:00 at approximately the junction of the  anterior middle thirds there is a 6 x 9 x 9 mm enhancing focus with some minimal  washout kinetics. There are no other spacious enhancing lesions identified.     Lymph: Unremarkable.     Chest wall and visualized abdomen: Unremarkable.     IMPRESSION  Known right breast carcinoma with no additional right breast lesions  identified. There are two suspicious enhancing masses left breast.     Right Breast:  BI-RADS Assessment Category 6: Known biopsy proven malignancy-  Appropriate action should be taken.     Left Breast:   BI-RADS Assessment Category 4: Suspicious abnormality - Biopsy  should be performed in the absence of clinical contraindication.     RECOMMENDATION: MRI guided left breast biopsy of two lesions. Records reviewed and summarized above. Pathology report(s) reviewed above. Radiology report(s) reviewed above. Assessment/plan:   1. Right breast LOQ IDC, 2.5 cm, 0/7 LN, gr 2, ER +, RI +, HER 2 negative, stage IIA, prognostic stage IA  High risk mammaprint    We explained to the patient that the goal of systemic adjuvant therapy is to improve the chances for cure and decrease the risk of relapse. We explained why a patient can have microscopic cancer spread now even though physical examination, laboratory studies and imaging studies are negative for cancer. We explained that the same treatments used now as adjuvant or preventive treatments rarely if ever are curative in women who develop metastases. We discussed the potential value of Mammaprint testing.   The [place] 70-gene prognostic profile (Mammaprint®), one of the first gene expression array-based prognosticators, classifies tumors as low-risk or high-risk for breast cancer recurrence. The clinical validity of the 70-gene prognostic profile has been demonstrated in multiple studies. Results from an international randomized trial, the Microarray in Node-Negative Disease May Avoid Chemotherapy (MINDACT) trial suggest that this genetic profile may identify subsets of patients who have a low likelihood of distant recurrence despite high-risk clinical features. In this trial, 2216 women, approximately [de-identified] percent of whom had lymph node-negative disease, underwent risk assessment by clinical criteria (using Adjuvant! Online) and by the 70-genetic profile. Patients with discordant clinical and genomic predictions were randomly assigned to receive or not receive adjuvant chemotherapy. Among patients in the intention-to-treat population who had a high clinical risk of recurrence but a low risk by [place] genetic profile, the five-year distant metastases-free survival rate was 95.9 percent with chemotherapy and 94.4 percent without chemotherapy (HR for distant metastasis or death 0.78, 95% CI 0.50-1.21). However, it should be noted that the MINDACT study was not powered to exclude a benefit of chemotherapy. In analysis of discordant groups in the intention-to-treat population, adjuvant chemotherapy was associated with improvement in the rate of distant metastasis-free survival of 2.8 and 2 percent, respectively, for the high clinical/low genomic and low clinical/high genomic risk groups, although these differences were also not statistically significant. Based on MINDACT, [employer] has suggested Karstenan Cowman is one of several assays that might be used to determine prognosis for those with high clinical risk, hormone receptor-positive, HER2-negative breast cancer and no or limited (one to three) involved lymph nodes to inform decisions regarding withholding chemotherapy.      High risk mammaprint, high risk mindact clinical disease, a discussion of chemotherapy is warranted    I discussed the potential risks of dose-dense chemotherapy with the patient. (DD AC-T, adriamycin 60 mg/m2; cyclophosphamide 600 mg/m2 q 2 weeks x 4; paclitaxel 80 mg/m2 qweekly x 12). Major toxicities include nausea and vomiting, stomatitis, fatigue, and a small risk of heart damage. Anemia frequently results and occasionally requires transfusions. Neutropenic fever is uncommon, but can be a life-threatening problem. Also, there is a small but increased risk of myelodysplasia and acute leukemia. We provided the patient with detailed information concerning toxicity including frequent toxicities that only last a few days, such as nausea, vomiting, mouth sores, arthralgia, myalgia, and potentially allergic reactions to paclitaxel, as well as toxicities which can be longer lasting including total alopecia, fatigue, anemia and neuropathy. We provided the patient with detailed information concerning the toxicities of their regimen in addition to our verbal discussion. We discussed the toxicities of TC chemotherapy (docetaxel 75mg/m2/cyclophosphamide 600 mg/m2 q 3 weeks x 4)  in detail. This chemotherapy frequently causes a low white blood cell count and a small percent of patients require hospital admission for treatment of neutropenic fever. We explained that on occasion we consider the use of growth factors to minimize this risk. We explained to the patient that some side effects, if they occur, only last a few days including nausea, vomiting, stomatitis, arthralgia, myalgia, and allergic reactions to Taxotere.  We told the patient that severe nausea and vomiting were very uncommon and that some side effects, if they occur, will last longer: this includes hair loss, which will be seen in all patients treated with these agents and fatigue, which will be seen in most. The patient was also told that if she is having menstrual function that she could develop chemotherapy-induced amenorrhea and infertility. We also told the patient that there was a very small risk of acute leukemia. We also informed the patient that heart damage is rare with these agents    Using predict, there is < 1% difference in OS between 2nd and 3rd gen chemo. I would then recommend TC for this situation. Cold caps discussed, she is using    Discussed risks of COVID19 and precautions to take. Strongly recommended vaccination, she has received her first dose    Discussed oral and peripheral cryotherapy, discussed neuropathy clinical trial and she has enrolled    After this discussion, she is agreeable to [facility] as above, she has signed informed consent, plan to start on 8/17/21. Dr. Tayler Wilkerson has placed port    The patient was given presciptions for a wig, emla cream, decadron to take 8 mg bid the day before and day after chemo, zofran and compazine. Neulasta the following day. TC #1 today    Following chemotherapy, she is an excellent candidate for XRT and endocrine therapy    We will plan to see the patient in follow up at least once per cycle, or sooner if symptoms warrant. Labs with each cycle to monitor for toxicity      2. Left breast central ILC, gr 1, multifocal, 1.8 cm and 1.5 cm, ER +, FL +, HER 2 negative and ER +, FL negative, HER 2 negative, 0/8 LN involved, stage IA both anatomic and prognostic  Low risk mammaprint    See treatment discussion above for higher risk disease    3. Emotional well being:  She has excellent support and is coping well with her disease    4. Genetic testing:   discussed potential ramifications of a positive test including the potential need for bilateral mastectomies and bilateral oophorectomies and the risk then for her family members to also have a mutation. VUS also discussed. Will perform this today        I appreciate the opportunity to participate in Ms. Guadalupe Zarate's care.     Signed By: Marva Barahona MD      No orders of the defined types were placed in this encounter.

## 2021-08-17 NOTE — PROGRESS NOTES
Pt has been screened for all eligibility/ineligibility criteria and has been determined eligible for this protocol. Informed consent was reviewed by RN with patient prior to signing. Patient was informed that participation is voluntary and she has the right to withdraw at anytime without prejudice. The patient has been deemed of adequate mental and emotional capacity to give an informed consent per Dr. Zakia Moreno. Possible side effects were discussed in detail, with patient being advised to inform RN or Dr. Zakia Moreno immediately in the event of any side effects once drug is started or at any time should she have any questions. All questions were answered adequately by RN or Dr. Zakia Moreno. Patient signed consent today for study [id number]. The patient was accompanied by her friend. A copy of ICF given to patient. No additional questions at this time. Baseline and week 1 labs, assessments, and QOLs completed per protocol. Patient also consented to DCP-001. QOL completed per protocol. Copy of consent given to patient. No further questions.

## 2021-08-17 NOTE — PROGRESS NOTES
[facility] Progress Note    Date: 2021    Name: Kurt David    MRN: [id number]         : 1960    Ms. Alexis Lechuga Arrived ambulatory and in no distress for C1D1 of Taxotere + Cytoxan  Regimen. Assessment was completed, no acute issues at this time, no new complaints voiced. Left chest wall port accessed without difficulty, labs drawn & sent for processing. Patient proceed to appointment with Dr. José Miguel Richards. Ms. Lisette Arteaga vitals were reviewed. Patient Vitals for the past 12 hrs:   Temp Pulse Resp BP SpO2   21 1535 97.2 °F (36.2 °C) 67 16 139/65 99 %   21 0912 99 °F (37.2 °C) 95 16 132/86 99 %     Lab results were obtained and reviewed, within parameters for treatment     Recent Results (from the past 12 hour(s))   CBC WITH AUTOMATED DIFF    Collection Time: 21  9:28 AM   Result Value Ref Range    WBC 11.3 (H) 3.6 - 11.0 K/uL    RBC 4.75 3.80 - 5.20 M/uL    HGB 13.7 11.5 - 16.0 g/dL    HCT 41.8 35.0 - 47.0 %    MCV 88.0 80.0 - 99.0 FL    MCH 28.8 26.0 - 34.0 PG    MCHC 32.8 30.0 - 36.5 g/dL    RDW 13.7 11.5 - 14.5 %    PLATELET 949 463 - 568 K/uL    MPV 9.4 8.9 - 12.9 FL    NRBC 0.0 0  WBC    ABSOLUTE NRBC 0.00 0.00 - 0.01 K/uL    NEUTROPHILS 83 (H) 32 - 75 %    LYMPHOCYTES 11 (L) 12 - 49 %    MONOCYTES 5 5 - 13 %    EOSINOPHILS 0 0 - 7 %    BASOPHILS 0 0 - 1 %    IMMATURE GRANULOCYTES 1 (H) 0.0 - 0.5 %    ABS. NEUTROPHILS 9.4 (H) 1.8 - 8.0 K/UL    ABS. LYMPHOCYTES 1.2 0.8 - 3.5 K/UL    ABS. MONOCYTES 0.6 0.0 - 1.0 K/UL    ABS. EOSINOPHILS 0.0 0.0 - 0.4 K/UL    ABS. BASOPHILS 0.0 0.0 - 0.1 K/UL    ABS. IMM.  GRANS. 0.1 (H) 0.00 - 0.04 K/UL    DF AUTOMATED     METABOLIC PANEL, COMPREHENSIVE    Collection Time: 21  9:28 AM   Result Value Ref Range    Sodium 139 136 - 145 mmol/L    Potassium 3.4 (L) 3.5 - 5.1 mmol/L    Chloride 107 97 - 108 mmol/L    CO2 25 21 - 32 mmol/L    Anion gap 7 5 - 15 mmol/L    Glucose 142 (H) 65 - 100 mg/dL    BUN 21 (H) 6 - 20 MG/DL Creatinine 0.80 0.55 - 1.02 MG/DL    BUN/Creatinine ratio 26 (H) 12 - 20      GFR est AA >60 >60 ml/min/1.73m2    GFR est non-AA >60 >60 ml/min/1.73m2    Calcium 9.6 8.5 - 10.1 MG/DL    Bilirubin, total 0.6 0.2 - 1.0 MG/DL    ALT (SGPT) 26 12 - 78 U/L    AST (SGOT) 16 15 - 37 U/L    Alk. phosphatase 90 45 - 117 U/L    Protein, total 7.7 6.4 - 8.2 g/dL    Albumin 3.9 3.5 - 5.0 g/dL    Globulin 3.8 2.0 - 4.0 g/dL    A-G Ratio 1.0 (L) 1.1 - 2.2         Medications: Patient was monitored 1:1 for first 15 minutes of initial infusions of Taxotere and Cytoxan.      Medications Administered     0.9% sodium chloride infusion     Admin Date  08/17/2021 Action  New Bag Dose  25 mL/hr Rate  25 mL/hr Route  IntraVENous Administered By  [place], [place]          0.9% sodium chloride injection 10 mL     Admin Date  08/17/2021 Action  Given Dose  10 mL Route  IntraVENous Administered By  [place], [place]          cyclophosphamide (CYTOXAN) 1,146 mg in 0.9% sodium chloride 250 mL, overfill volume 25 mL chemo infusion     Admin Date  08/17/2021 Action  New Bag Dose  1,146 mg Rate  664.6 mL/hr Route  IntraVENous Administered By  [place], [place]          dexamethasone (DECADRON) 10 mg/mL injection 10 mg     Admin Date  08/17/2021 Action  Given Dose  10 mg Route  IntraVENous Administered By  [place], [place]          DOCEtaxeL (TAXOTERE) 143 mg in 0.9% sodium chloride 250 mL, overfill volume 25 mL chemo infusion     Admin Date  08/17/2021 Action  New Bag Dose  143 mg Rate  289.3 mL/hr Route  IntraVENous Administered By  [place], [place]          heparin (porcine) pf 300-500 Units     Admin Date  08/17/2021 Action  Given Dose  500 Units Route  InterCATHeter Administered By  [place], [place]          palonosetron HCl (ALOXI) injection 0.25 mg     Admin Date  08/17/2021 Action  Given Dose  0.25 mg Route  IntraVENous Administered By  [facility] [place]          pegfilgrastim (NEULASTA) wearable SQ injector 6 mg     Admin Date  08/17/2021 Action  Given Dose  6 mg Route  SubCUTAneous Administered By  [place], [place]          potassium chloride SR (KLOR-CON 10) tablet 40 mEq     Admin Date  08/17/2021 Action  Given Dose  40 mEq Route  Oral Administered By  [place], [place]          sodium chloride 0.9 % bolus infusion 500 mL     Admin Date  08/17/2021 Action  New Bag Dose  500 mL Rate  1,000 mL/hr Route  IntraVENous Administered By  [address] [place] [address]              Prior to cooling the patient's hair was dampened with water and hair conditioner was applied to improve scalp contact and reduce the insulation effect of hair. Pre-cooling time: Cold cap therapy initiated 30 minutes prior to Taxotere infusion. Infusion time: 90 minutes    Post infusion cooling time: Cold cap therapy continued 90 minutes post Cytoxan infusion. Education provided to patient about Neulasta On Body Injector including; side effects, how/when to remove the device, as well as what to do in the event of device malfunction. Opportunity for questions was provided and all questions were answered. Patient verbalized understanding. Ms. Dorota Oh tolerated treatment well and was discharged from [place] [address] in stable condition at 1600. Port de-accessed, flushed & heparinized per protocol. She is to return on September 7 at 0900 for her next appointment.     [facility]  August 17, 2021

## 2021-08-17 NOTE — PROGRESS NOTES
08/17/2021--This user prepared the [employer] kit and will set up front for [employer]  today before 5:00 pm. This user also sent patient's information via fax to [employer] and will have fax confirmation scanned into patient's chart.  number is--[id number] and Tracking number is--[id number].

## 2021-08-17 NOTE — PROGRESS NOTES

## 2021-08-23 ENCOUNTER — DOCUMENTATION ONLY (OUTPATIENT)
Dept: SURGERY | Age: 61
End: 2021-08-23

## 2021-08-25 ENCOUNTER — TELEPHONE (OUTPATIENT)
Dept: ONCOLOGY | Age: 61
End: 2021-08-25

## 2021-08-25 NOTE — TELEPHONE ENCOUNTER
8/25/21 9:17 AM: Received call from patient, who stated that she has had a bloody nose three times this morning. Patient stated that the first time, there was blood when she blew her nose and then the second and third time, her nose began bleeding and she was able to get it stopped within 2-3 minutes. Also stated she has a rash \"all over her body\" that started since her last treatment and now she has red, open sores. Patient stated that the sores are not oozing or weeping. Also stated that she has experienced abdominal pain since her last treatment but is not experiencing nausea, vomiting, diarrhea, or fever. Advised that Dr. Wyatt Dangelo will be consulted and the patient will receive a call back. 8/25/21 12:44 PM: Called patient and advised that per Dr. Wyatt Dangelo, the nose bleeds are likely due to dryness from chemotherapy and he recommends using over the counter saline nasal spray. Also advised that Dr. Wyatt Dangelo recommends for her to use oral Benadryl for the rash and to let this office know if the rash does not improve. Advised patient that per Dr. Wyatt Dangelo, the abdominal pain is likely irritation from chemotherapy and Dr. Wyatt Dangelo recommends trying over the counter Nexium or Pepcid. Patient verbalized understanding and did not have any questions at this time.

## 2021-08-27 ENCOUNTER — TELEPHONE (OUTPATIENT)
Dept: ONCOLOGY | Age: 61
End: 2021-08-27

## 2021-08-27 NOTE — TELEPHONE ENCOUNTER
08/27/2021--This user called and spoke with the patient and let her know our NP's recommendation's and also the s/s's of an infection to look out for and notify us if they occur. I advised patient if the rash was not any better or any worse by next week to give this office a call back. Patient verbalized understanding and did not have any further question's or concerns at that time. 08/27/2021 at 2:00 pm--This user called and spoke with the patient, she stated that she cannot send a photo and has not tried a topical Benadryl or Hydrocortisone. I let her know that I would consult with our NP again and then give her another call back. Patient verbalized understanding. 08/27/2021--This user called and spoke with the patient, she stated that the rash has been there since she got the Infusion but has gotten worse in the last 4-5 days. Patient reported it is on her face, back side and stated it is hard to walk because it is on her feet as well. Patient reports that it is painful and itches a little when they first breakout and she has tried putting Calamine lotion on with no relief and also has been taking Benadryl and doing the cold showers as instructed. Patient stated that she cannot take Zyrtec/Claritin because it upsets her stomach. Patient reported that it feels like a \"chemical burn\". Patient reported no fevers or any other symptoms. I let patient know that I would consult with our NP and give her a call back. Patient verbalized understanding and had no further question's or concerns at that time.

## 2021-09-07 ENCOUNTER — RESEARCH ENCOUNTER (OUTPATIENT)
Dept: ONCOLOGY | Age: 61
End: 2021-09-07

## 2021-09-07 ENCOUNTER — OFFICE VISIT (OUTPATIENT)
Dept: ONCOLOGY | Age: 61
End: 2021-09-07
Payer: COMMERCIAL

## 2021-09-07 ENCOUNTER — HOSPITAL ENCOUNTER (OUTPATIENT)
Dept: INFUSION THERAPY | Age: 61
Discharge: HOME | End: 2021-09-07
Payer: COMMERCIAL

## 2021-09-07 VITALS
SYSTOLIC BLOOD PRESSURE: 119 MMHG | HEIGHT: 60 IN | HEART RATE: 79 BPM | TEMPERATURE: 98.9 F | BODY MASS INDEX: 36.32 KG/M2 | DIASTOLIC BLOOD PRESSURE: 69 MMHG | RESPIRATION RATE: 16 BRPM | WEIGHT: 185 LBS | OXYGEN SATURATION: 99 %

## 2021-09-07 VITALS
HEIGHT: 60 IN | TEMPERATURE: 95.6 F | HEART RATE: 73 BPM | OXYGEN SATURATION: 99 % | RESPIRATION RATE: 16 BRPM | DIASTOLIC BLOOD PRESSURE: 63 MMHG | WEIGHT: 185.5 LBS | SYSTOLIC BLOOD PRESSURE: 124 MMHG | BODY MASS INDEX: 36.42 KG/M2

## 2021-09-07 DIAGNOSIS — C50.811 MALIGNANT NEOPLASM OF OVERLAPPING SITES OF BOTH BREASTS IN FEMALE, ESTROGEN RECEPTOR POSITIVE (HCC): ICD-10-CM

## 2021-09-07 DIAGNOSIS — Z17.0 MALIGNANT NEOPLASM OF OVERLAPPING SITES OF BOTH BREASTS IN FEMALE, ESTROGEN RECEPTOR POSITIVE (HCC): ICD-10-CM

## 2021-09-07 DIAGNOSIS — C50.812 MALIGNANT NEOPLASM OF OVERLAPPING SITES OF BOTH BREASTS IN FEMALE, ESTROGEN RECEPTOR POSITIVE (HCC): ICD-10-CM

## 2021-09-07 DIAGNOSIS — C50.812 MALIGNANT NEOPLASM OF OVERLAPPING SITES OF BOTH BREASTS IN FEMALE, ESTROGEN RECEPTOR POSITIVE (HCC): Primary | ICD-10-CM

## 2021-09-07 DIAGNOSIS — Z17.0 MALIGNANT NEOPLASM OF OVERLAPPING SITES OF BOTH BREASTS IN FEMALE, ESTROGEN RECEPTOR POSITIVE (HCC): Primary | ICD-10-CM

## 2021-09-07 DIAGNOSIS — C50.811 MALIGNANT NEOPLASM OF OVERLAPPING SITES OF BOTH BREASTS IN FEMALE, ESTROGEN RECEPTOR POSITIVE (HCC): Primary | ICD-10-CM

## 2021-09-07 LAB
ALBUMIN SERPL-MCNC: 3.9 G/DL (ref 3.5–5)
ALBUMIN/GLOB SERPL: 1.1 {RATIO} (ref 1.1–2.2)
ALP SERPL-CCNC: 77 U/L (ref 45–117)
ALT SERPL-CCNC: 26 U/L (ref 12–78)
ANION GAP SERPL CALC-SCNC: 5 MMOL/L (ref 5–15)
APPEARANCE UR: ABNORMAL
AST SERPL-CCNC: 11 U/L (ref 15–37)
BACTERIA URNS QL MICRO: NEGATIVE /HPF
BASOPHILS # BLD: 0 K/UL (ref 0–0.1)
BASOPHILS NFR BLD: 0 % (ref 0–1)
BILIRUB SERPL-MCNC: 0.5 MG/DL (ref 0.2–1)
BILIRUB UR QL: NEGATIVE
BUN SERPL-MCNC: 26 MG/DL (ref 6–20)
BUN/CREAT SERPL: 36 (ref 12–20)
CALCIUM SERPL-MCNC: 9.6 MG/DL (ref 8.5–10.1)
CHLORIDE SERPL-SCNC: 109 MMOL/L (ref 97–108)
CO2 SERPL-SCNC: 25 MMOL/L (ref 21–32)
COLOR UR: ABNORMAL
CREAT SERPL-MCNC: 0.72 MG/DL (ref 0.55–1.02)
DIFFERENTIAL METHOD BLD: ABNORMAL
EOSINOPHIL # BLD: 0 K/UL (ref 0–0.4)
EOSINOPHIL NFR BLD: 0 % (ref 0–7)
EPITH CASTS URNS QL MICRO: ABNORMAL /LPF
ERYTHROCYTE [DISTWIDTH] IN BLOOD BY AUTOMATED COUNT: 15 % (ref 11.5–14.5)
GLOBULIN SER CALC-MCNC: 3.4 G/DL (ref 2–4)
GLUCOSE SERPL-MCNC: 119 MG/DL (ref 65–100)
GLUCOSE UR STRIP.AUTO-MCNC: NEGATIVE MG/DL
HCT VFR BLD AUTO: 37.7 % (ref 35–47)
HGB BLD-MCNC: 12.4 G/DL (ref 11.5–16)
HGB UR QL STRIP: NEGATIVE
IMM GRANULOCYTES # BLD AUTO: 0.2 K/UL (ref 0–0.04)
IMM GRANULOCYTES NFR BLD AUTO: 1 % (ref 0–0.5)
KETONES UR QL STRIP.AUTO: NEGATIVE MG/DL
LEUKOCYTE ESTERASE UR QL STRIP.AUTO: ABNORMAL
LYMPHOCYTES # BLD: 0.9 K/UL (ref 0.8–3.5)
LYMPHOCYTES NFR BLD: 6 % (ref 12–49)
MCH RBC QN AUTO: 28.7 PG (ref 26–34)
MCHC RBC AUTO-ENTMCNC: 32.9 G/DL (ref 30–36.5)
MCV RBC AUTO: 87.3 FL (ref 80–99)
MONOCYTES # BLD: 0.5 K/UL (ref 0–1)
MONOCYTES NFR BLD: 3 % (ref 5–13)
NEUTS SEG # BLD: 13.9 K/UL (ref 1.8–8)
NEUTS SEG NFR BLD: 90 % (ref 32–75)
NITRITE UR QL STRIP.AUTO: NEGATIVE
NRBC # BLD: 0 K/UL (ref 0–0.01)
NRBC BLD-RTO: 0 PER 100 WBC
PH UR STRIP: 6 [PH] (ref 5–8)
PLATELET # BLD AUTO: 454 K/UL (ref 150–400)
PMV BLD AUTO: 9.3 FL (ref 8.9–12.9)
POTASSIUM SERPL-SCNC: 3.9 MMOL/L (ref 3.5–5.1)
PROT SERPL-MCNC: 7.3 G/DL (ref 6.4–8.2)
PROT UR STRIP-MCNC: NEGATIVE MG/DL
RBC # BLD AUTO: 4.32 M/UL (ref 3.8–5.2)
RBC #/AREA URNS HPF: ABNORMAL /HPF (ref 0–5)
SODIUM SERPL-SCNC: 139 MMOL/L (ref 136–145)
SP GR UR REFRACTOMETRY: 1.02 (ref 1–1.03)
UROBILINOGEN UR QL STRIP.AUTO: 0.2 EU/DL (ref 0.2–1)
WBC # BLD AUTO: 15.5 K/UL (ref 3.6–11)
WBC URNS QL MICRO: ABNORMAL /HPF (ref 0–4)

## 2021-09-07 PROCEDURE — 81001 URINALYSIS AUTO W/SCOPE: CPT

## 2021-09-07 PROCEDURE — 74011250636 HC RX REV CODE- 250/636: Performed by: INTERNAL MEDICINE

## 2021-09-07 PROCEDURE — 85025 COMPLETE CBC W/AUTO DIFF WBC: CPT

## 2021-09-07 PROCEDURE — 99215 OFFICE O/P EST HI 40 MIN: CPT | Performed by: INTERNAL MEDICINE

## 2021-09-07 PROCEDURE — 77030012965 HC NDL HUBR BBMI -A

## 2021-09-07 PROCEDURE — 96361 HYDRATE IV INFUSION ADD-ON: CPT

## 2021-09-07 PROCEDURE — 96413 CHEMO IV INFUSION 1 HR: CPT

## 2021-09-07 PROCEDURE — 96377 APPLICATON ON-BODY INJECTOR: CPT

## 2021-09-07 PROCEDURE — 96375 TX/PRO/DX INJ NEW DRUG ADDON: CPT

## 2021-09-07 PROCEDURE — 96417 CHEMO IV INFUS EACH ADDL SEQ: CPT

## 2021-09-07 PROCEDURE — 80053 COMPREHEN METABOLIC PANEL: CPT

## 2021-09-07 PROCEDURE — 36415 COLL VENOUS BLD VENIPUNCTURE: CPT

## 2021-09-07 RX ORDER — SODIUM CHLORIDE 9 MG/ML
10 INJECTION INTRAMUSCULAR; INTRAVENOUS; SUBCUTANEOUS AS NEEDED
Status: ACTIVE | OUTPATIENT
Start: 2021-09-07 | End: 2021-09-07

## 2021-09-07 RX ORDER — SODIUM CHLORIDE 9 MG/ML
25 INJECTION, SOLUTION INTRAVENOUS CONTINUOUS
Status: DISPENSED | OUTPATIENT
Start: 2021-09-07 | End: 2021-09-07

## 2021-09-07 RX ORDER — SODIUM CHLORIDE 0.9 % (FLUSH) 0.9 %
10 SYRINGE (ML) INJECTION AS NEEDED
Status: DISPENSED | OUTPATIENT
Start: 2021-09-07 | End: 2021-09-07

## 2021-09-07 RX ORDER — PREDNISONE 10 MG/1
10 TABLET ORAL SEE ADMIN INSTRUCTIONS
Qty: 21 TABLET | Refills: 0 | Status: SHIPPED | OUTPATIENT
Start: 2021-09-07 | End: 2021-09-28

## 2021-09-07 RX ORDER — PALONOSETRON 0.05 MG/ML
0.25 INJECTION, SOLUTION INTRAVENOUS ONCE
Status: COMPLETED | OUTPATIENT
Start: 2021-09-07 | End: 2021-09-07

## 2021-09-07 RX ORDER — HEPARIN 100 UNIT/ML
300-500 SYRINGE INTRAVENOUS AS NEEDED
Status: ACTIVE | OUTPATIENT
Start: 2021-09-07 | End: 2021-09-07

## 2021-09-07 RX ORDER — DEXAMETHASONE SODIUM PHOSPHATE 100 MG/10ML
10 INJECTION INTRAMUSCULAR; INTRAVENOUS ONCE
Status: COMPLETED | OUTPATIENT
Start: 2021-09-07 | End: 2021-09-07

## 2021-09-07 RX ADMIN — DOCETAXEL ANHYDROUS 143 MG: 10 INJECTION, SOLUTION INTRAVENOUS at 12:37

## 2021-09-07 RX ADMIN — SODIUM CHLORIDE 500 ML: 9 INJECTION, SOLUTION INTRAVENOUS at 11:15

## 2021-09-07 RX ADMIN — PEGFILGRASTIM 6 MG: KIT SUBCUTANEOUS at 14:39

## 2021-09-07 RX ADMIN — SODIUM CHLORIDE 25 ML/HR: 9 INJECTION, SOLUTION INTRAVENOUS at 11:09

## 2021-09-07 RX ADMIN — CYCLOPHOSPHAMIDE 1146 MG: 500 INJECTION, POWDER, FOR SOLUTION INTRAVENOUS; ORAL at 13:50

## 2021-09-07 RX ADMIN — PALONOSETRON 0.25 MG: 0.05 INJECTION, SOLUTION INTRAVENOUS at 11:10

## 2021-09-07 RX ADMIN — DEXAMETHASONE SODIUM PHOSPHATE 10 MG: 10 INJECTION INTRAMUSCULAR; INTRAVENOUS at 11:11

## 2021-09-07 NOTE — PROGRESS NOTES
[facility] [place] Progress Note    Date: 2021    Name: Rupinder Chance    MRN: [id number]         : 1960    Ms. Andres Xiao Arrived ambulatory and in no distress for C2D1 of Taxotere + Cytoxan Regimen. Assessment was completed, Patient with a rash all over body since last treatment and patient losing some hair. L chest wall port accessed without difficulty, labs drawn & sent for processing. Covid Questionnaire completed. 1. Do you have any symptoms of covid 19? SOB, coughing, fever, or generally not feeling well? - no  2. Have you been exposed to covid 19 recently? - no  3. Have you had any recent contact with family/friend that has a pending covid test? no      Chemotherapy Flowsheet 2021   Cycle C2D1   Date 2021   Pre Hydration given   Post Hydration -   Pre Meds given   Notes given       Patient proceed to MD appointment. Patient returned with no change in treatment. Ms. A[place] vitals were reviewed. Visit Vitals  /63   Pulse 73   Temp (!) 95.6 °F (35.3 °C)   Resp 16   Ht 5' (1.524 m)   Wt 84.1 kg (185 lb 8 oz)   SpO2 99%   Breastfeeding No   BMI 36.23 kg/m²       Lab results were obtained and reviewed. Recent Results (from the past 12 hour(s))   CBC WITH AUTOMATED DIFF    Collection Time: 21  9:56 AM   Result Value Ref Range    WBC 15.5 (H) 3.6 - 11.0 K/uL    RBC 4.32 3.80 - 5.20 M/uL    HGB 12.4 11.5 - 16.0 g/dL    HCT 37.7 35.0 - 47.0 %    MCV 87.3 80.0 - 99.0 FL    MCH 28.7 26.0 - 34.0 PG    MCHC 32.9 30.0 - 36.5 g/dL    RDW 15.0 (H) 11.5 - 14.5 %    PLATELET 841 (H) 898 - 400 K/uL    MPV 9.3 8.9 - 12.9 FL    NRBC 0.0 0  WBC    ABSOLUTE NRBC 0.00 0.00 - 0.01 K/uL    NEUTROPHILS 90 (H) 32 - 75 %    LYMPHOCYTES 6 (L) 12 - 49 %    MONOCYTES 3 (L) 5 - 13 %    EOSINOPHILS 0 0 - 7 %    BASOPHILS 0 0 - 1 %    IMMATURE GRANULOCYTES 1 (H) 0.0 - 0.5 %    ABS. NEUTROPHILS 13.9 (H) 1.8 - 8.0 K/UL    ABS. LYMPHOCYTES 0.9 0.8 - 3.5 K/UL    ABS.  MONOCYTES 0.5 0.0 - 1.0 K/UL ABS. EOSINOPHILS 0.0 0.0 - 0.4 K/UL    ABS. BASOPHILS 0.0 0.0 - 0.1 K/UL    ABS. IMM. GRANS. 0.2 (H) 0.00 - 0.04 K/UL    DF AUTOMATED     METABOLIC PANEL, COMPREHENSIVE    Collection Time: 09/07/21  9:56 AM   Result Value Ref Range    Sodium 139 136 - 145 mmol/L    Potassium 3.9 3.5 - 5.1 mmol/L    Chloride 109 (H) 97 - 108 mmol/L    CO2 25 21 - 32 mmol/L    Anion gap 5 5 - 15 mmol/L    Glucose 119 (H) 65 - 100 mg/dL    BUN 26 (H) 6 - 20 MG/DL    Creatinine 0.72 0.55 - 1.02 MG/DL    BUN/Creatinine ratio 36 (H) 12 - 20      GFR est AA >60 >60 ml/min/1.73m2    GFR est non-AA >60 >60 ml/min/1.73m2    Calcium 9.6 8.5 - 10.1 MG/DL    Bilirubin, total 0.5 0.2 - 1.0 MG/DL    ALT (SGPT) 26 12 - 78 U/L    AST (SGOT) 11 (L) 15 - 37 U/L    Alk.  phosphatase 77 45 - 117 U/L    Protein, total 7.3 6.4 - 8.2 g/dL    Albumin 3.9 3.5 - 5.0 g/dL    Globulin 3.4 2.0 - 4.0 g/dL    A-G Ratio 1.1 1.1 - 2.2     URINALYSIS W/ RFLX MICROSCOPIC    Collection Time: 09/07/21  1:02 PM   Result Value Ref Range    Color YELLOW/STRAW      Appearance TURBID (A) CLEAR      Specific gravity 1.016 1.003 - 1.030      pH (UA) 6.0 5.0 - 8.0      Protein Negative NEG mg/dL    Glucose Negative NEG mg/dL    Ketone Negative NEG mg/dL    Bilirubin Negative NEG      Blood Negative NEG      Urobilinogen 0.2 0.2 - 1.0 EU/dL    Nitrites Negative NEG      Leukocyte Esterase SMALL (A) NEG      WBC 0-4 0 - 4 /hpf    RBC 0-5 0 - 5 /hpf    Epithelial cells FEW FEW /lpf    Bacteria Negative NEG /hpf     Medications:  Medications Administered     0.9% sodium chloride infusion     Admin Date  09/07/2021 Action  New Bag Dose  25 mL/hr Rate  25 mL/hr Route  IntraVENous Administered By  David Salgado RN          cyclophosphamide (CYTOXAN) 1,146 mg in 0.9% sodium chloride 250 mL, overfill volume 25 mL chemo infusion     Admin Date  09/07/2021 Action  New Bag Dose  1,146 mg Rate  664.6 mL/hr Route  IntraVENous Administered By  David Salgado RN          dexamethasone (DECADRON) 10 mg/mL injection 10 mg     Admin Date  09/07/2021 Action  Given Dose  10 mg Route  IntraVENous Administered By  Alejo Morfin RN          DOCEtaxeL (TAXOTERE) 143 mg in 0.9% sodium chloride 250 mL, overfill volume 25 mL chemo infusion     Admin Date  09/07/2021 Action  New Bag Dose  143 mg Rate  289.3 mL/hr Route  IntraVENous Administered By  Alejo Morfin RN          palonosetron HCl (ALOXI) injection 0.25 mg     Admin Date  09/07/2021 Action  Given Dose  0.25 mg Route  IntraVENous Administered By  Alejo Morfin RN          pegfilgrastim (NEULASTA) wearable SQ injector 6 mg     Admin Date  09/07/2021 Action  Given Dose  6 mg Route  SubCUTAneous Administered By  Alejo Morfin RN          sodium chloride 0.9 % bolus infusion 500 mL     Admin Date  09/07/2021 Action  New Bag Dose  500 mL Route  IntraVENous Administered By  Alejo Morfin RN               Patient participated in Unity Cap therapy during treatment:  Prior to cooling the patient/family dampened hair with water and applied hair conditioner to improve scalp contact and reduce the insulation effect of hair. Prior to cooling the patient/family dampened hair with water and applied hair conditioner to improve scalp contact and reduce the insulation effect of hair. Pre-cooling time: Cold cap therapy initiated 30 minutes prior to infusion. Cold cap therapy continued during taxotere and cytoxan infusion. Post infusion cooling time: Cold cap therapy continued 90 minutes post  infusion. Ms. Daniel Dickens tolerated treatment well and was discharged from [place] [address] in stable condition. Port de-accessed, flushed & heparinized per protocol. She is to return on September 28 for her next appointment.     Fabiano Bales RN  September 7, 2021

## 2021-09-07 NOTE — PROGRESS NOTES
Pt in for follow up visit today per protocol with Dr. Wyatt Dangelo and RN. This is [place] [id number] of [id number]. Research labs, neuropathy assessment, and QOLs done per protocol. Next appt is scheduled for W8 9/28/2021.

## 2021-09-07 NOTE — PROGRESS NOTES
Mozella Setting is a 64 y.o. female Follow up for the evaluation of breast cancer . 1. Have you been to the ER, urgent care clinic since your last visit? Hospitalized since your last visit? No    2. Have you seen or consulted any other health care providers outside of the [address] [place] [address] since your last visit? Include any pap smears or colon screening.  No

## 2021-09-07 NOTE — PROGRESS NOTES
[facility] at [address], [address] [place] [address] Road [address]  W: [phone]  F: [phone]      Reason for Visit:   Tj Carrillo is a 64 y.o. female who is seen in consultation at the request of Dr. Ann-Marie Coleman for evaluation of therapy for breast cancer    Rad onc:  Dr. Neva Nelson    Treatment History:   · 5/24/21 right breast mass core bx:  IDC, 7 mm, gr 1-2, no LVI, ER + at 100%, LA + at 95%, HER 2 negative at [facility] 1+, ki67 < 5%  · MRI bx, 6/16/21 breast left site A, posterior bx: ILC, gr 1, 6 mm, ER + at 99%, LA + at 60%, HER 2 negative at [facility] 1+, ki67 19%; left breast site B, anterior bx:  ILC, gr 1, 1.1 cm, ER + at 99%, LA negative, HER 2 negative ([facility] 2+; FISH ratio 1.1 ; sig/cell 2); ki67 18%  · 6/29/21 right breast lumpectomy:  IDC, 2.5 cm, gr 1, DCIS, gr 2, focal 2 mm, 0/7 LN, pT2 pN0 cM0; left breast lumpectomy:  Multifocal ILC, gr 1, 1.8 cm and 1.5 cm, 0/8 LN, pT1c pN0 cM0  · Mammaprint, right breast, high-risk, luminal type B  · Mammaprint, left breast, site B, low-risk, luminal type A  · 9/4/21 invitae genetic testing, negative  · TC 8/17/21-    History of Present Illness:    An abnormal right mammogram led to the pathology above    Interval history: reports gr 1 constipation, gr 1 diarrhea, gr 1 fatigue, gr 2 nausea, gr 1 hot flashes, no pain today, gr 1 sob, gr 1 itching, gr 2 rash, gr 1 LE edema, gr 1 headache, gr 1 increase in urinary frequency, gr 2 urinary leakage    FH:  Father with lung cancer; no breast; paternal aunt with ovarian cancer, no prostate or pancreas cancer    Past Medical History:   Diagnosis Date    Asthma     Cancer ([facility] [address] 06/2021    bilateral breast cancer    COVID-19 02/2021    GERD (gastroesophageal reflux disease)     Hyperlipemia     Hypertension       Past Surgical History:   Procedure Laterality Date    HX BREAST LUMPECTOMY Bilateral 6/29/2021    LEFT BREAST LUMPECTOMY WITH BRACKETED NEEDLE LOCALIZATION, LEFT BREAST SENTINEL NODE BIOPSY, RIGHT BREAST LUMPECTOMY WITH ULTRASOUND, RIGHT BREAST SENTINEL NODE BIOPSY performed by Katharina Guadalupe MD at [address] HX ORTHOPAEDIC Left 2016    parital left knee arthroplasty    MD ABDOMEN SURGERY PROC UNLISTED  2006    lap nissen      Social History     Tobacco Use    Smoking status: Never Smoker    Smokeless tobacco: Never Used   Substance Use Topics    Alcohol use: Not Currently     Comment:  2 drinks per year      Family History   Problem Relation Age of Onset    Lung Cancer Father      Current Outpatient Medications   Medication Sig    prochlorperazine (COMPAZINE) 10 mg tablet Take 1 Tablet by mouth every six (6) hours as needed for Nausea.  lidocaine-prilocaine (EMLA) topical cream Apply  to affected area as needed for Pain.  ondansetron hcl (ZOFRAN) 8 mg tablet Take 1 Tablet by mouth every eight (8) hours as needed for Nausea.  dexAMETHasone (DECADRON) 4 mg tablet 8 mg PO bid the day before and day after chemo    rosuvastatin (CRESTOR) 20 mg tablet Take 20 mg by mouth daily.  cholecalciferol, vitamin D3, (Vitamin D3) 50 mcg (2,000 unit) tab Take 1 Tablet by mouth daily.  omeprazole (PRILOSEC) 20 mg capsule Take 20 mg by mouth daily.  celecoxib (CELEBREX) 200 mg capsule TAKE 1 CAPSULE BY MOUTH ONCE DAILY WITH FOOD FOR 30 DAYS    lisinopril-hydroCHLOROthiazide (PRINZIDE, ZESTORETIC) 20-25 mg per tablet Take 1 Tablet by mouth daily.  traMADoL (ULTRAM) 50 mg tablet Take 50 mg by mouth every six (6) hours as needed for Pain. No current facility-administered medications for this visit. No Known Allergies     Review of Systems: A complete review of systems was obtained, negative except as described above.     Physical Exam:     Visit Vitals  /69   Pulse 79   Temp 98.9 °F (37.2 °C) (Temporal)   Resp 16   Ht 5' (1.524 m)   Wt 185 lb (83.9 kg)   SpO2 99%   BMI 36.13 kg/m²     ECOG PS: 0    Constitutional: Appears well-developed and well-nourished in no apparent distress      Mental status: Alert and awake, Oriented to person/place/time, Able to follow commands    Eyes: EOM normal, Sclera normal, No visible ocular discharge    HENT: Normocephalic, atraumatic    Mouth/Throat: Moist mucous membranes    External Ears normal    Neck: No visualized mass    Pulmonary/Chest: Respiratory effort normal, No visualized signs of difficulty breathing or respiratory distress    Musculoskeletal: Normal gait with no signs of ataxia, Normal range of motion of neck    Neurological: No facial asymmetry (Cranial nerve 7 motor function), No gaze palsy    Skin: No significant exanthematous lesions or discoloration noted on facial skin    Psychiatric: Normal affect, No hallucinations               Results:     Lab Results   Component Value Date/Time    WBC 15.5 (H) 09/07/2021 09:56 AM    HGB 12.4 09/07/2021 09:56 AM    HCT 37.7 09/07/2021 09:56 AM    PLATELET 244 (H) 33/89/0155 09:56 AM    MCV 87.3 09/07/2021 09:56 AM    ABS. NEUTROPHILS 13.9 (H) 09/07/2021 09:56 AM     Lab Results   Component Value Date/Time    Sodium 139 08/17/2021 09:28 AM    Potassium 3.4 (L) 08/17/2021 09:28 AM    Chloride 107 08/17/2021 09:28 AM    CO2 25 08/17/2021 09:28 AM    Glucose 142 (H) 08/17/2021 09:28 AM    BUN 21 (H) 08/17/2021 09:28 AM    Creatinine 0.80 08/17/2021 09:28 AM    GFR est AA >60 08/17/2021 09:28 AM    GFR est non-AA >60 08/17/2021 09:28 AM    Calcium 9.6 08/17/2021 09:28 AM     Lab Results   Component Value Date/Time    Bilirubin, total 0.6 08/17/2021 09:28 AM    ALT (SGPT) 26 08/17/2021 09:28 AM    Alk.  phosphatase 90 08/17/2021 09:28 AM    Protein, total 7.7 08/17/2021 09:28 AM    Albumin 3.9 08/17/2021 09:28 AM    Globulin 3.8 08/17/2021 09:28 AM     6/8/21 MRI breast  FINDINGS:      Background enhancement: Minimal.     Right Breast: At about the 8:00 position and middle depth of the right breast  there is a 5 x 6 x 8 mm enhancing nodule adjacent to the posterior aspect of  HydroMark clip signal void. There are no other enhancing lesions to suggest any  additional sites of invasive breast carcinoma.     Left Breast: There is a 7 x 9 x 11 mm mass with washout kinetics in the 12:00  position of the central left breast near the junction of the posterior middle  thirds. Slightly superior to this at 12:00 at approximately the junction of the  anterior middle thirds there is a 6 x 9 x 9 mm enhancing focus with some minimal  washout kinetics. There are no other spacious enhancing lesions identified.     Lymph: Unremarkable.     Chest wall and visualized abdomen: Unremarkable.     IMPRESSION  Known right breast carcinoma with no additional right breast lesions  identified. There are two suspicious enhancing masses left breast.     Right Breast:  BI-RADS Assessment Category 6: Known biopsy proven malignancy-  Appropriate action should be taken.     Left Breast:   BI-RADS Assessment Category 4: Suspicious abnormality - Biopsy  should be performed in the absence of clinical contraindication.     RECOMMENDATION: MRI guided left breast biopsy of two lesions. Records reviewed and summarized above. Pathology report(s) reviewed above. Radiology report(s) reviewed above. Assessment/plan:   1. Right breast LOQ IDC, 2.5 cm, 0/7 LN, gr 2, ER +, WA +, HER 2 negative, stage IIA, prognostic stage IA  High risk mammaprint    We explained to the patient that the goal of systemic adjuvant therapy is to improve the chances for cure and decrease the risk of relapse. We explained why a patient can have microscopic cancer spread now even though physical examination, laboratory studies and imaging studies are negative for cancer. We explained that the same treatments used now as adjuvant or preventive treatments rarely if ever are curative in women who develop metastases. We discussed the potential value of Mammaprint testing.   The Singer 70-gene prognostic profile (Mammaprint®), one of the first gene expression array-based prognosticators, classifies tumors as low-risk or high-risk for breast cancer recurrence. The clinical validity of the 70-gene prognostic profile has been demonstrated in multiple studies. Results from an international randomized trial, the Microarray in Node-Negative Disease May Avoid Chemotherapy (MINDACT) trial suggest that this genetic profile may identify subsets of patients who have a low likelihood of distant recurrence despite high-risk clinical features. In this trial, [id number] women, approximately [de-identified] percent of whom had lymph node-negative disease, underwent risk assessment by clinical criteria (using Adjuvant! Online) and by the 70-genetic profile. Patients with discordant clinical and genomic predictions were randomly assigned to receive or not receive adjuvant chemotherapy. Among patients in the intention-to-treat population who had a high clinical risk of recurrence but a low risk by [employer] genetic profile, the five-year distant metastases-free survival rate was 95.9 percent with chemotherapy and 94.4 percent without chemotherapy (HR for distant metastasis or death 0.78, 95% CI 0.50-1.21). However, it should be noted that the MINDACT study was not powered to exclude a benefit of chemotherapy. In analysis of discordant groups in the intention-to-treat population, adjuvant chemotherapy was associated with improvement in the rate of distant metastasis-free survival of 2.8 and 2 percent, respectively, for the high clinical/low genomic and low clinical/high genomic risk groups, although these differences were also not statistically significant. Based on MINDACT, [employer] has suggested Sandra Section is one of several assays that might be used to determine prognosis for those with high clinical risk, hormone receptor-positive, HER2-negative breast cancer and no or limited (one to three) involved lymph nodes to inform decisions regarding withholding chemotherapy.      High risk mammaprint, high risk mindact clinical disease, a discussion of chemotherapy is warranted    I discussed the potential risks of dose-dense chemotherapy with the patient. (DD AC-T, adriamycin 60 mg/m2; cyclophosphamide 600 mg/m2 q 2 weeks x 4; paclitaxel 80 mg/m2 qweekly x 12). Major toxicities include nausea and vomiting, stomatitis, fatigue, and a small risk of heart damage. Anemia frequently results and occasionally requires transfusions. Neutropenic fever is uncommon, but can be a life-threatening problem. Also, there is a small but increased risk of myelodysplasia and acute leukemia. We provided the patient with detailed information concerning toxicity including frequent toxicities that only last a few days, such as nausea, vomiting, mouth sores, arthralgia, myalgia, and potentially allergic reactions to paclitaxel, as well as toxicities which can be longer lasting including total alopecia, fatigue, anemia and neuropathy. We provided the patient with detailed information concerning the toxicities of their regimen in addition to our verbal discussion. We discussed the toxicities of TC chemotherapy (docetaxel 75mg/m2/cyclophosphamide 600 mg/m2 q 3 weeks x 4)  in detail. This chemotherapy frequently causes a low white blood cell count and a small percent of patients require hospital admission for treatment of neutropenic fever. We explained that on occasion we consider the use of growth factors to minimize this risk. We explained to the patient that some side effects, if they occur, only last a few days including nausea, vomiting, stomatitis, arthralgia, myalgia, and allergic reactions to Taxotere.  We told the patient that severe nausea and vomiting were very uncommon and that some side effects, if they occur, will last longer: this includes hair loss, which will be seen in all patients treated with these agents and fatigue, which will be seen in most. The patient was also told that if she is having menstrual function that she could develop chemotherapy-induced amenorrhea and infertility. We also told the patient that there was a very small risk of acute leukemia. We also informed the patient that heart damage is rare with these agents    Using predict, there is < 1% difference in OS between 2nd and 3rd gen chemo. I would then recommend TC for this situation. Cold caps discussed, she is using    Discussed risks of COVID19 and precautions to take. Strongly recommended vaccination, she has received her first dose    Discussed oral and peripheral cryotherapy, discussed neuropathy clinical trial and she has enrolled    After this discussion, she is agreeable to [facility] as above, she has signed informed consent, plan to start on 8/17/21. Dr. Ross Mueller has placed port    The patient was given presciptions for a wig, emla cream, decadron to take 8 mg bid the day before and day after chemo, zofran and compazine. Neulasta the following day. TC #2 today    Following chemotherapy, she is an excellent candidate for XRT and endocrine therapy    We will plan to see the patient in follow up at least once per cycle, or sooner if symptoms warrant. Labs with each cycle to monitor for toxicity      2. Left breast central ILC, gr 1, multifocal, 1.8 cm and 1.5 cm, ER +, MI +, HER 2 negative and ER +, MI negative, HER 2 negative, 0/8 LN involved, stage IA both anatomic and prognostic  Low risk mammaprint    See treatment discussion above for higher risk disease    3. Emotional well being:  She has excellent support and is coping well with her disease    4. Genetic testing:   discussed potential ramifications of a positive test including the potential need for bilateral mastectomies and bilateral oophorectomies and the risk then for her family members to also have a mutation. VUS also discussed. invitae testing negative    5.  Rash:  Due to taxotere; started 3-4 days after chemo; now improved; PO benadryl did not help; advised benadryl cream; will rx prednisone dose pack in case this occurs again    6. Urine frequency:  Will check UA    I appreciate the opportunity to participate in Ms. Guadalupe Zarate's care. Signed By: Ruben Reinoso MD      No orders of the defined types were placed in this encounter.

## 2021-09-20 ENCOUNTER — NURSE NAVIGATOR (OUTPATIENT)
Dept: CASE MANAGEMENT | Age: 61
End: 2021-09-20

## 2021-09-20 RX ORDER — SODIUM CHLORIDE 0.9 % (FLUSH) 0.9 %
10 SYRINGE (ML) INJECTION AS NEEDED
Status: CANCELLED | OUTPATIENT
Start: 2021-09-21

## 2021-09-20 RX ORDER — SODIUM CHLORIDE 9 MG/ML
10 INJECTION INTRAMUSCULAR; INTRAVENOUS; SUBCUTANEOUS AS NEEDED
Status: CANCELLED | OUTPATIENT
Start: 2021-09-21

## 2021-09-20 RX ORDER — HEPARIN 100 UNIT/ML
300-500 SYRINGE INTRAVENOUS AS NEEDED
Status: CANCELLED
Start: 2021-09-21

## 2021-09-20 NOTE — NURSE NAVIGATOR
[address]  Breast Navigator Encounter    Name: Geo Ventura  Age: 64 y.o.  : 1960  Diagnosis: Right breast IDC; ER+/MO+/HER2-;  Stage IIA; High risk mammaprint    Encounter type:  []Patient Initiated  [x]Navigator Follow-up []Pre-op  []Post-op  []Check-in Prior to First Treatment []Treatment Modality Change  []Survivorship Transition []Other:     Narrative:   Called pt to check in. She reports frequent dizzy spells and shaking. She is concerned that her blood glucose may be getting too low because she feels better after eating and drinking OJ. She states her BP has been okay and no other adverse events are of note. Forwarded pt's concerns to Johny Tolbert NP who recommends pt come in to [address] [place] [address] for fluids, vitals, and labs. Appointment made for tomorrow, , at 3:00pm. Confirmed appointment with pt. Encouraged adequate PO intake and to call if any other concerns.      Interdisciplinary Team:  Med-Onc: Dr. Mansoor Rodriguez MD  Surg-Onc: Dr. John Givens  Rad-Onc: Dr. Jhoana Ibarra MD  Plastics:  : Cindy Johns LCSW  Nurse Navigator:  LUKE Curry, RN, LUKE Conde, RN, VIA [facility]  Oncology Breast Navigator    [address]  [address] [place] [address]  W: [phone]  F: [phone]  [email or web address]   Good Help to Those in [facility]

## 2021-09-21 ENCOUNTER — HOSPITAL ENCOUNTER (OUTPATIENT)
Dept: INFUSION THERAPY | Age: 61
Discharge: HOME | End: 2021-09-21
Payer: COMMERCIAL

## 2021-09-21 VITALS
OXYGEN SATURATION: 96 % | SYSTOLIC BLOOD PRESSURE: 115 MMHG | RESPIRATION RATE: 16 BRPM | DIASTOLIC BLOOD PRESSURE: 77 MMHG | WEIGHT: 189.9 LBS | TEMPERATURE: 97 F | BODY MASS INDEX: 37.28 KG/M2 | HEART RATE: 82 BPM | HEIGHT: 60 IN

## 2021-09-21 DIAGNOSIS — C50.812 MALIGNANT NEOPLASM OF OVERLAPPING SITES OF BOTH BREASTS IN FEMALE, ESTROGEN RECEPTOR POSITIVE (HCC): Primary | ICD-10-CM

## 2021-09-21 DIAGNOSIS — C50.811 MALIGNANT NEOPLASM OF OVERLAPPING SITES OF BOTH BREASTS IN FEMALE, ESTROGEN RECEPTOR POSITIVE (HCC): Primary | ICD-10-CM

## 2021-09-21 DIAGNOSIS — Z17.0 MALIGNANT NEOPLASM OF OVERLAPPING SITES OF BOTH BREASTS IN FEMALE, ESTROGEN RECEPTOR POSITIVE (HCC): Primary | ICD-10-CM

## 2021-09-21 LAB
ANION GAP SERPL CALC-SCNC: 6 MMOL/L (ref 5–15)
BASOPHILS # BLD: 0 K/UL (ref 0–0.1)
BASOPHILS NFR BLD: 0 % (ref 0–1)
BUN SERPL-MCNC: 31 MG/DL (ref 6–20)
BUN/CREAT SERPL: 47 (ref 12–20)
CALCIUM SERPL-MCNC: 9.3 MG/DL (ref 8.5–10.1)
CHLORIDE SERPL-SCNC: 104 MMOL/L (ref 97–108)
CO2 SERPL-SCNC: 28 MMOL/L (ref 21–32)
CREAT SERPL-MCNC: 0.66 MG/DL (ref 0.55–1.02)
DIFFERENTIAL METHOD BLD: ABNORMAL
EOSINOPHIL # BLD: 0 K/UL (ref 0–0.4)
EOSINOPHIL NFR BLD: 0 % (ref 0–7)
ERYTHROCYTE [DISTWIDTH] IN BLOOD BY AUTOMATED COUNT: 16.1 % (ref 11.5–14.5)
GLUCOSE SERPL-MCNC: 90 MG/DL (ref 65–100)
HCT VFR BLD AUTO: 34 % (ref 35–47)
HGB BLD-MCNC: 11.3 G/DL (ref 11.5–16)
IMM GRANULOCYTES # BLD AUTO: 0 K/UL
IMM GRANULOCYTES NFR BLD AUTO: 0 %
LYMPHOCYTES # BLD: 1.7 K/UL (ref 0.8–3.5)
LYMPHOCYTES NFR BLD: 14 % (ref 12–49)
MCH RBC QN AUTO: 29.7 PG (ref 26–34)
MCHC RBC AUTO-ENTMCNC: 33.2 G/DL (ref 30–36.5)
MCV RBC AUTO: 89.5 FL (ref 80–99)
METAMYELOCYTES NFR BLD MANUAL: 1 %
MONOCYTES # BLD: 0.6 K/UL (ref 0–1)
MONOCYTES NFR BLD: 5 % (ref 5–13)
MYELOCYTES NFR BLD MANUAL: 1 %
NEUTS SEG # BLD: 9.3 K/UL (ref 1.8–8)
NEUTS SEG NFR BLD: 79 % (ref 32–75)
NRBC # BLD: 0.02 K/UL (ref 0–0.01)
NRBC BLD-RTO: 0.2 PER 100 WBC
PLATELET # BLD AUTO: 144 K/UL (ref 150–400)
PMV BLD AUTO: 9.4 FL (ref 8.9–12.9)
POTASSIUM SERPL-SCNC: 3.8 MMOL/L (ref 3.5–5.1)
RBC # BLD AUTO: 3.8 M/UL (ref 3.8–5.2)
RBC MORPH BLD: ABNORMAL
SODIUM SERPL-SCNC: 138 MMOL/L (ref 136–145)
WBC # BLD AUTO: 11.8 K/UL (ref 3.6–11)

## 2021-09-21 PROCEDURE — 85025 COMPLETE CBC W/AUTO DIFF WBC: CPT

## 2021-09-21 PROCEDURE — 77030012965 HC NDL HUBR BBMI -A

## 2021-09-21 PROCEDURE — 96360 HYDRATION IV INFUSION INIT: CPT

## 2021-09-21 PROCEDURE — 36415 COLL VENOUS BLD VENIPUNCTURE: CPT

## 2021-09-21 PROCEDURE — 74011250636 HC RX REV CODE- 250/636: Performed by: NURSE PRACTITIONER

## 2021-09-21 PROCEDURE — 80048 BASIC METABOLIC PNL TOTAL CA: CPT

## 2021-09-21 RX ORDER — SODIUM CHLORIDE 9 MG/ML
10 INJECTION INTRAMUSCULAR; INTRAVENOUS; SUBCUTANEOUS AS NEEDED
Status: DISCONTINUED | OUTPATIENT
Start: 2021-09-21 | End: 2021-09-22 | Stop reason: HOSPADM

## 2021-09-21 RX ORDER — SODIUM CHLORIDE 0.9 % (FLUSH) 0.9 %
10 SYRINGE (ML) INJECTION AS NEEDED
Status: DISCONTINUED | OUTPATIENT
Start: 2021-09-21 | End: 2021-09-22 | Stop reason: HOSPADM

## 2021-09-21 RX ORDER — HEPARIN 100 UNIT/ML
300-500 SYRINGE INTRAVENOUS AS NEEDED
Status: DISCONTINUED | OUTPATIENT
Start: 2021-09-21 | End: 2021-09-22 | Stop reason: HOSPADM

## 2021-09-21 RX ADMIN — HEPARIN 500 UNITS: 100 SYRINGE at 16:41

## 2021-09-21 RX ADMIN — SODIUM CHLORIDE 1000 ML: 9 INJECTION, SOLUTION INTRAVENOUS at 15:31

## 2021-09-21 RX ADMIN — Medication 10 ML: at 15:30

## 2021-09-21 NOTE — PROGRESS NOTES
[facility] VISIT NOTE    1520  Pt arrived at [facility] ambulatory and in no distress for hydration. Assessment completed, pt c/o dizziness and blurred vision a few times since her last treatment. She said the thing that helps is to eat something with sugar like a soda or donut. I encouraged her to also eat something with protein in addition to the sugar. She's going to eat small, frequent meals and drink plenty of fluids to see if that helps. Patient Vitals for the past 12 hrs:   Temp Pulse Resp BP SpO2   09/21/21 1633  82 16 115/77    09/21/21 1531 97 °F (36.1 °C) 94 16 122/61 96 %     Left chest port accessed with 0.75 in steiner no difficulty. Positive blood return noted and labs drawn. Recent Results (from the past 12 hour(s))   CBC WITH AUTOMATED DIFF    Collection Time: 09/21/21  3:33 PM   Result Value Ref Range    WBC 11.8 (H) 3.6 - 11.0 K/uL    RBC 3.80 3.80 - 5.20 M/uL    HGB 11.3 (L) 11.5 - 16.0 g/dL    HCT 34.0 (L) 35.0 - 47.0 %    MCV 89.5 80.0 - 99.0 FL    MCH 29.7 26.0 - 34.0 PG    MCHC 33.2 30.0 - 36.5 g/dL    RDW 16.1 (H) 11.5 - 14.5 %    PLATELET 573 (L) 357 - 400 K/uL    MPV 9.4 8.9 - 12.9 FL    NRBC 0.2 (H) 0  WBC    ABSOLUTE NRBC 0.02 (H) 0.00 - 0.01 K/uL    NEUTROPHILS 79 (H) 32 - 75 %    LYMPHOCYTES 14 12 - 49 %    MONOCYTES 5 5 - 13 %    EOSINOPHILS 0 0 - 7 %    BASOPHILS 0 0 - 1 %    METAMYELOCYTES 1 (H) 0 %    MYELOCYTES 1 (H) 0 %    IMMATURE GRANULOCYTES 0 %    ABS. NEUTROPHILS 9.3 (H) 1.8 - 8.0 K/UL    ABS. LYMPHOCYTES 1.7 0.8 - 3.5 K/UL    ABS. MONOCYTES 0.6 0.0 - 1.0 K/UL    ABS. EOSINOPHILS 0.0 0.0 - 0.4 K/UL    ABS. BASOPHILS 0.0 0.0 - 0.1 K/UL    ABS. IMM.  GRANS. 0.0 K/UL    DF MANUAL      RBC COMMENTS ANISOCYTOSIS  1+       METABOLIC PANEL, BASIC    Collection Time: 09/21/21  3:33 PM   Result Value Ref Range    Sodium 138 136 - 145 mmol/L    Potassium 3.8 3.5 - 5.1 mmol/L    Chloride 104 97 - 108 mmol/L    CO2 28 21 - 32 mmol/L    Anion gap 6 5 - 15 mmol/L    Glucose 90 65 - 100 mg/dL    BUN 31 (H) 6 - 20 MG/DL    Creatinine 0.66 0.55 - 1.02 MG/DL    BUN/Creatinine ratio 47 (H) 12 - 20      GFR est AA >60 >60 ml/min/1.73m2    GFR est non-AA >60 >60 ml/min/1.73m2    Calcium 9.3 8.5 - 10.1 MG/DL     Medications received:  1 liter NS IV    Tolerated treatment well, no adverse reaction noted. Port de-accessed and flushed per protocol. Positive blood return noted. 1640  D/C'd from [facility] ambulatory and in no distress accompanied by her friend. Next appointment is 9/28/21 at 0900. Patient denied having any symptoms of COVID-19, i.e. SOB, coughing, fever, or generally not feeling well.   Also denies having been exposed to COVID-19 recently or having had any recent contact with family/friend that has a pending COVID test.

## 2021-09-28 ENCOUNTER — OFFICE VISIT (OUTPATIENT)
Dept: ONCOLOGY | Age: 61
End: 2021-09-28
Payer: COMMERCIAL

## 2021-09-28 ENCOUNTER — RESEARCH ENCOUNTER (OUTPATIENT)
Dept: ONCOLOGY | Age: 61
End: 2021-09-28

## 2021-09-28 ENCOUNTER — HOSPITAL ENCOUNTER (OUTPATIENT)
Dept: INFUSION THERAPY | Age: 61
Discharge: HOME | End: 2021-09-28
Payer: COMMERCIAL

## 2021-09-28 VITALS
TEMPERATURE: 97.3 F | BODY MASS INDEX: 36.91 KG/M2 | HEIGHT: 60 IN | HEART RATE: 73 BPM | SYSTOLIC BLOOD PRESSURE: 114 MMHG | WEIGHT: 188 LBS | DIASTOLIC BLOOD PRESSURE: 68 MMHG | OXYGEN SATURATION: 99 % | RESPIRATION RATE: 18 BRPM

## 2021-09-28 VITALS
OXYGEN SATURATION: 99 % | WEIGHT: 188.7 LBS | HEART RATE: 73 BPM | HEIGHT: 60 IN | RESPIRATION RATE: 18 BRPM | SYSTOLIC BLOOD PRESSURE: 114 MMHG | TEMPERATURE: 97.3 F | BODY MASS INDEX: 37.05 KG/M2 | DIASTOLIC BLOOD PRESSURE: 68 MMHG

## 2021-09-28 DIAGNOSIS — C50.912 BILATERAL MALIGNANT NEOPLASM OF BREAST IN FEMALE, ESTROGEN RECEPTOR POSITIVE, UNSPECIFIED SITE OF BREAST (HCC): Primary | ICD-10-CM

## 2021-09-28 DIAGNOSIS — Z17.0 MALIGNANT NEOPLASM OF OVERLAPPING SITES OF BOTH BREASTS IN FEMALE, ESTROGEN RECEPTOR POSITIVE (HCC): ICD-10-CM

## 2021-09-28 DIAGNOSIS — Z17.0 BILATERAL MALIGNANT NEOPLASM OF BREAST IN FEMALE, ESTROGEN RECEPTOR POSITIVE, UNSPECIFIED SITE OF BREAST (HCC): Primary | ICD-10-CM

## 2021-09-28 DIAGNOSIS — C50.911 BILATERAL MALIGNANT NEOPLASM OF BREAST IN FEMALE, ESTROGEN RECEPTOR POSITIVE, UNSPECIFIED SITE OF BREAST (HCC): Primary | ICD-10-CM

## 2021-09-28 DIAGNOSIS — C50.811 MALIGNANT NEOPLASM OF OVERLAPPING SITES OF BOTH BREASTS IN FEMALE, ESTROGEN RECEPTOR POSITIVE (HCC): ICD-10-CM

## 2021-09-28 DIAGNOSIS — C50.812 MALIGNANT NEOPLASM OF OVERLAPPING SITES OF BOTH BREASTS IN FEMALE, ESTROGEN RECEPTOR POSITIVE (HCC): ICD-10-CM

## 2021-09-28 DIAGNOSIS — Z51.11 CHEMOTHERAPY MANAGEMENT, ENCOUNTER FOR: ICD-10-CM

## 2021-09-28 DIAGNOSIS — R42 DIZZINESS: ICD-10-CM

## 2021-09-28 LAB
ALBUMIN SERPL-MCNC: 3.8 G/DL (ref 3.5–5)
ALBUMIN/GLOB SERPL: 1.2 {RATIO} (ref 1.1–2.2)
ALP SERPL-CCNC: 76 U/L (ref 45–117)
ALT SERPL-CCNC: 27 U/L (ref 12–78)
ANION GAP SERPL CALC-SCNC: 6 MMOL/L (ref 5–15)
AST SERPL-CCNC: 9 U/L (ref 15–37)
BASOPHILS # BLD: 0 K/UL (ref 0–0.1)
BASOPHILS NFR BLD: 0 % (ref 0–1)
BILIRUB SERPL-MCNC: 0.5 MG/DL (ref 0.2–1)
BUN SERPL-MCNC: 20 MG/DL (ref 6–20)
BUN/CREAT SERPL: 30 (ref 12–20)
CALCIUM SERPL-MCNC: 9.8 MG/DL (ref 8.5–10.1)
CHLORIDE SERPL-SCNC: 109 MMOL/L (ref 97–108)
CO2 SERPL-SCNC: 27 MMOL/L (ref 21–32)
CREAT SERPL-MCNC: 0.66 MG/DL (ref 0.55–1.02)
DIFFERENTIAL METHOD BLD: ABNORMAL
EOSINOPHIL # BLD: 0 K/UL (ref 0–0.4)
EOSINOPHIL NFR BLD: 0 % (ref 0–7)
ERYTHROCYTE [DISTWIDTH] IN BLOOD BY AUTOMATED COUNT: 17.1 % (ref 11.5–14.5)
GLOBULIN SER CALC-MCNC: 3.3 G/DL (ref 2–4)
GLUCOSE SERPL-MCNC: 93 MG/DL (ref 65–100)
HCT VFR BLD AUTO: 36.2 % (ref 35–47)
HGB BLD-MCNC: 11.8 G/DL (ref 11.5–16)
IMM GRANULOCYTES # BLD AUTO: 0.1 K/UL (ref 0–0.04)
IMM GRANULOCYTES NFR BLD AUTO: 1 % (ref 0–0.5)
LYMPHOCYTES # BLD: 1.4 K/UL (ref 0.8–3.5)
LYMPHOCYTES NFR BLD: 11 % (ref 12–49)
MCH RBC QN AUTO: 29.5 PG (ref 26–34)
MCHC RBC AUTO-ENTMCNC: 32.6 G/DL (ref 30–36.5)
MCV RBC AUTO: 90.5 FL (ref 80–99)
MONOCYTES # BLD: 0.9 K/UL (ref 0–1)
MONOCYTES NFR BLD: 7 % (ref 5–13)
NEUTS SEG # BLD: 10.4 K/UL (ref 1.8–8)
NEUTS SEG NFR BLD: 81 % (ref 32–75)
NRBC # BLD: 0 K/UL (ref 0–0.01)
NRBC BLD-RTO: 0 PER 100 WBC
PLATELET # BLD AUTO: 375 K/UL (ref 150–400)
PMV BLD AUTO: 8.9 FL (ref 8.9–12.9)
POTASSIUM SERPL-SCNC: 4.1 MMOL/L (ref 3.5–5.1)
PROT SERPL-MCNC: 7.1 G/DL (ref 6.4–8.2)
RBC # BLD AUTO: 4 M/UL (ref 3.8–5.2)
SODIUM SERPL-SCNC: 142 MMOL/L (ref 136–145)
WBC # BLD AUTO: 12.9 K/UL (ref 3.6–11)

## 2021-09-28 PROCEDURE — 96413 CHEMO IV INFUSION 1 HR: CPT

## 2021-09-28 PROCEDURE — 74011250636 HC RX REV CODE- 250/636: Performed by: INTERNAL MEDICINE

## 2021-09-28 PROCEDURE — 74011000250 HC RX REV CODE- 250: Performed by: INTERNAL MEDICINE

## 2021-09-28 PROCEDURE — 96375 TX/PRO/DX INJ NEW DRUG ADDON: CPT

## 2021-09-28 PROCEDURE — 80053 COMPREHEN METABOLIC PANEL: CPT

## 2021-09-28 PROCEDURE — 96417 CHEMO IV INFUS EACH ADDL SEQ: CPT

## 2021-09-28 PROCEDURE — 36593 DECLOT VASCULAR DEVICE: CPT

## 2021-09-28 PROCEDURE — 77030012965 HC NDL HUBR BBMI -A

## 2021-09-28 PROCEDURE — 99215 OFFICE O/P EST HI 40 MIN: CPT | Performed by: INTERNAL MEDICINE

## 2021-09-28 PROCEDURE — 96367 TX/PROPH/DG ADDL SEQ IV INF: CPT

## 2021-09-28 PROCEDURE — 36415 COLL VENOUS BLD VENIPUNCTURE: CPT

## 2021-09-28 PROCEDURE — 96377 APPLICATON ON-BODY INJECTOR: CPT

## 2021-09-28 PROCEDURE — 85025 COMPLETE CBC W/AUTO DIFF WBC: CPT

## 2021-09-28 RX ORDER — HEPARIN 100 UNIT/ML
300-500 SYRINGE INTRAVENOUS AS NEEDED
Status: DISCONTINUED | OUTPATIENT
Start: 2021-09-28 | End: 2021-09-29 | Stop reason: HOSPADM

## 2021-09-28 RX ORDER — SODIUM CHLORIDE 0.9 % (FLUSH) 0.9 %
10 SYRINGE (ML) INJECTION AS NEEDED
Status: DISCONTINUED | OUTPATIENT
Start: 2021-09-28 | End: 2021-09-29 | Stop reason: HOSPADM

## 2021-09-28 RX ORDER — PALONOSETRON 0.05 MG/ML
0.25 INJECTION, SOLUTION INTRAVENOUS ONCE
Status: COMPLETED | OUTPATIENT
Start: 2021-09-28 | End: 2021-09-28

## 2021-09-28 RX ORDER — SODIUM CHLORIDE 9 MG/ML
10 INJECTION INTRAMUSCULAR; INTRAVENOUS; SUBCUTANEOUS AS NEEDED
Status: CANCELLED | OUTPATIENT
Start: 2021-10-01

## 2021-09-28 RX ORDER — HEPARIN 100 UNIT/ML
300-500 SYRINGE INTRAVENOUS AS NEEDED
Status: CANCELLED | OUTPATIENT
Start: 2021-10-01

## 2021-09-28 RX ORDER — SODIUM CHLORIDE 9 MG/ML
25 INJECTION, SOLUTION INTRAVENOUS CONTINUOUS
Status: DISCONTINUED | OUTPATIENT
Start: 2021-09-28 | End: 2021-09-29 | Stop reason: HOSPADM

## 2021-09-28 RX ORDER — DEXAMETHASONE SODIUM PHOSPHATE 100 MG/10ML
10 INJECTION INTRAMUSCULAR; INTRAVENOUS ONCE
Status: COMPLETED | OUTPATIENT
Start: 2021-09-28 | End: 2021-09-28

## 2021-09-28 RX ORDER — SODIUM CHLORIDE 0.9 % (FLUSH) 0.9 %
10 SYRINGE (ML) INJECTION AS NEEDED
Status: CANCELLED | OUTPATIENT
Start: 2021-10-01

## 2021-09-28 RX ORDER — SODIUM CHLORIDE 9 MG/ML
10 INJECTION INTRAMUSCULAR; INTRAVENOUS; SUBCUTANEOUS AS NEEDED
Status: DISCONTINUED | OUTPATIENT
Start: 2021-09-28 | End: 2021-09-29 | Stop reason: HOSPADM

## 2021-09-28 RX ADMIN — PALONOSETRON 0.25 MG: 0.05 INJECTION, SOLUTION INTRAVENOUS at 13:46

## 2021-09-28 RX ADMIN — DEXAMETHASONE SODIUM PHOSPHATE 10 MG: 10 INJECTION INTRAMUSCULAR; INTRAVENOUS at 13:48

## 2021-09-28 RX ADMIN — SODIUM CHLORIDE 25 ML/HR: 9 INJECTION, SOLUTION INTRAVENOUS at 13:45

## 2021-09-28 RX ADMIN — Medication 500 UNITS: at 17:10

## 2021-09-28 RX ADMIN — PEGFILGRASTIM 6 MG: KIT SUBCUTANEOUS at 17:12

## 2021-09-28 RX ADMIN — SODIUM CHLORIDE 10 ML: 9 INJECTION INTRAMUSCULAR; INTRAVENOUS; SUBCUTANEOUS at 17:10

## 2021-09-28 RX ADMIN — SODIUM CHLORIDE 500 ML: 9 INJECTION, SOLUTION INTRAVENOUS at 13:50

## 2021-09-28 RX ADMIN — DOCETAXEL ANHYDROUS 143 MG: 10 INJECTION, SOLUTION INTRAVENOUS at 14:58

## 2021-09-28 RX ADMIN — ALTEPLASE 2 MG: 2.2 INJECTION, POWDER, LYOPHILIZED, FOR SOLUTION INTRAVENOUS at 10:43

## 2021-09-28 RX ADMIN — CYCLOPHOSPHAMIDE 1146 MG: 500 INJECTION, POWDER, FOR SOLUTION INTRAVENOUS; ORAL at 16:13

## 2021-09-28 NOTE — PROGRESS NOTES
[facility]fusion [facility] - Chemotherapy Progress Note    C 7 D 1 TC/OBI neulasta ambulatory in stable condition. Assessment completed. No new concerns voiced. PAC withOUT positive blood return. Cathflo instilled. Labs obtained and patient proceeded to scheduled MD appointment. Pt returned- no changes to treatment. NO blood return-received orders _ok to use with positive free flow, no resistance and no pain. Assessment and PAC by Shakira Ware  Chemotherapy Flowsheet 9/28/2021   Cycle C3D1   Date 9/28/2021   Drug / Regimen Docetaxel + Cytoxan   Pre Hydration -   Post Hydration -   Pre Meds -   Notes -       Visit Vitals  /68   Pulse 73   Temp 97.3 °F (36.3 °C)   Resp 18   Ht 5' (1.524 m)   Wt 85.6 kg (188 lb 11.2 oz)   SpO2 99%   BMI 36.85 kg/m²     Pt declines pregnancy    Pt using cold cap and cold therapy to bilateral feet and hands. Bllod return at completion of Docetaxel and prior to Cytoxan    Medications:  Medications Administered     0.9% sodium chloride infusion     Admin Date  09/28/2021 Action  New Bag Dose  25 mL/hr Rate  25 mL/hr Route  IntraVENous Administered By  Nghia Florian RN          0.9% sodium chloride injection 10 mL     Admin Date  09/28/2021 Action  Given Dose  10 mL Route  IntraVENous Administered By  Eufemia Cosby, RN          alteplase (CATHFLO) 2 mg in sterile water (preservative free) 2 mL injection     Admin Date  09/28/2021 Action  Given Dose  2 mg Route  InterCATHeter Administered By  Nghia Florian, RN          cyclophosphamide (CYTOXAN) 1,146 mg in 0.9% sodium chloride 250 mL, overfill volume 25 mL chemo infusion     Admin Date  09/28/2021 Action  New Bag Dose  1,146 mg Rate  664.6 mL/hr Route  IntraVENous Administered By  Eufemia Cosby, RN          dexamethasone (DECADRON) 10 mg/mL injection 10 mg     Admin Date  09/28/2021 Action  Given Dose  10 mg Route  IntraVENous Administered By  Nghia Florian RN          DOCEtaxeL (TAXOTERE) 143 mg in 0.9% sodium chloride 250 mL, overfill volume 25 mL chemo infusion     Admin Date  09/28/2021 Action  New Bag Dose  143 mg Rate  289.3 mL/hr Route  IntraVENous Administered By  Anabella Patel RN          heparin (porcine) pf 300-500 Units     Admin Date  09/28/2021 Action  Given Dose  500 Units Route  InterCATHeter Administered By  Juanita Toledo RN          palonosetron HCl (ALOXI) injection 0.25 mg     Admin Date  09/28/2021 Action  Given Dose  0.25 mg Route  IntraVENous Administered By  Anabella Patel RN          pegfilgrastim (NEULASTA) wearable SQ injector 6 mg     Admin Date  09/28/2021 Action  Given Dose  6 mg Route  SubCUTAneous Administered By  Juanita Toledo RN          sodium chloride 0.9 % bolus infusion 500 mL     Admin Date  09/28/2021 Action  New Bag Dose  500 mL Route  IntraVENous Administered By  Juanita Toledo RN            Pt for 90  mins post cold cap wait. Education provided to patient about Neulasta On Body Injector including: side effects, how/when to remove the device, as well as what to do in the event of device malfunction. Opportunity for questions was provided and all questions were answered. Patient verbalized understanding. 1815 Pt tolerated treatment well. PAC maintained positive blood return throughout cytoxan treatment, flushed with positive blood return at conclusion . Najma Rowan D/sukhdev home ambulatory in no distress. Pt aware of next appointment scheduled for 10/12/21.     Recent Results (from the past 12 hour(s))   CBC WITH AUTOMATED DIFF    Collection Time: 09/28/21  9:57 AM   Result Value Ref Range    WBC 12.9 (H) 3.6 - 11.0 K/uL    RBC 4.00 3.80 - 5.20 M/uL    HGB 11.8 11.5 - 16.0 g/dL    HCT 36.2 35.0 - 47.0 %    MCV 90.5 80.0 - 99.0 FL    MCH 29.5 26.0 - 34.0 PG    MCHC 32.6 30.0 - 36.5 g/dL    RDW 17.1 (H) 11.5 - 14.5 %    PLATELET 215 357 - 016 K/uL    MPV 8.9 8.9 - 12.9 FL    NRBC 0.0 0  WBC    ABSOLUTE NRBC 0.00 0.00 - 0.01 K/uL    NEUTROPHILS 81 (H) 32 - 75 %    LYMPHOCYTES 11 (L) 12 - 49 %    MONOCYTES 7 5 - 13 %    EOSINOPHILS 0 0 - 7 %    BASOPHILS 0 0 - 1 %    IMMATURE GRANULOCYTES 1 (H) 0.0 - 0.5 %    ABS. NEUTROPHILS 10.4 (H) 1.8 - 8.0 K/UL    ABS. LYMPHOCYTES 1.4 0.8 - 3.5 K/UL    ABS. MONOCYTES 0.9 0.0 - 1.0 K/UL    ABS. EOSINOPHILS 0.0 0.0 - 0.4 K/UL    ABS. BASOPHILS 0.0 0.0 - 0.1 K/UL    ABS. IMM. GRANS. 0.1 (H) 0.00 - 0.04 K/UL    DF AUTOMATED     METABOLIC PANEL, COMPREHENSIVE    Collection Time: 09/28/21  9:57 AM   Result Value Ref Range    Sodium 142 136 - 145 mmol/L    Potassium 4.1 3.5 - 5.1 mmol/L    Chloride 109 (H) 97 - 108 mmol/L    CO2 27 21 - 32 mmol/L    Anion gap 6 5 - 15 mmol/L    Glucose 93 65 - 100 mg/dL    BUN 20 6 - 20 MG/DL    Creatinine 0.66 0.55 - 1.02 MG/DL    BUN/Creatinine ratio 30 (H) 12 - 20      GFR est AA >60 >60 ml/min/1.73m2    GFR est non-AA >60 >60 ml/min/1.73m2    Calcium 9.8 8.5 - 10.1 MG/DL    Bilirubin, total 0.5 0.2 - 1.0 MG/DL    ALT (SGPT) 27 12 - 78 U/L    AST (SGOT) 9 (L) 15 - 37 U/L    Alk.  phosphatase 76 45 - 117 U/L    Protein, total 7.1 6.4 - 8.2 g/dL    Albumin 3.8 3.5 - 5.0 g/dL    Globulin 3.3 2.0 - 4.0 g/dL    A-G Ratio 1.2 1.1 - 2.2

## 2021-09-28 NOTE — PROGRESS NOTES
Pt in for labs and follow up visit today per protocol with Dr. Odin Umanzor and RN. This is week 8 of treatment. QOLs and assessments completed per protocol. Patient to go to infusion center after appointment to receive TC. Next appt is scheduled for week 12.

## 2021-09-28 NOTE — PROGRESS NOTES
Deep Sommer is a 64 y.o. female Follow up for the evaluation of breast cancer. 1. Have you been to the ER, urgent care clinic since your last visit? Hospitalized since your last visit? No    2. Have you seen or consulted any other health care providers outside of the [address] [place] [address] since your last visit? Include any pap smears or colon screening.  No

## 2021-09-28 NOTE — PROGRESS NOTES
[facility] [place] at [address], [address] [facility]  W: [phone]  F: [phone]      Reason for Visit:   Saleem Oliveira is a 64 y.o. female who is seen in follow up for breast cancer    Rad onc:  Dr. Arlean Schilder  Breast Surgeon: Dr. Mel Garrido    Treatment History:   · 5/24/21 right breast mass core bx:  IDC, 7 mm, gr 1-2, no LVI, ER + at 100%, LA + at 95%, HER 2 negative at [facility] 1+, ki67 < 5%  · MRI bx, 6/16/21 breast left site A, posterior bx: ILC, gr 1, 6 mm, ER + at 99%, LA + at 60%, HER 2 negative at [facility] 1+, ki67 19%; left breast site B, anterior bx:  ILC, gr 1, 1.1 cm, ER + at 99%, LA negative, HER 2 negative ([facility] 2+; FISH ratio 1.1 ; sig/cell 2); ki67 18%  · 6/29/21 right breast lumpectomy:  IDC, 2.5 cm, gr 1, DCIS, gr 2, focal 2 mm, 0/7 LN, pT2 pN0 cM0; left breast lumpectomy:  Multifocal ILC, gr 1, 1.8 cm and 1.5 cm, 0/8 LN, pT1c pN0 cM0  · Mammaprint, right breast, high-risk, luminal type B  · Mammaprint, left breast, site B, low-risk, luminal type A  · 9/4/21 invitae genetic testing, negative  · TC 8/17/21-    History of Present Illness: An abnormal right mammogram led to the pathology above    Interval history: Patient presents today for follow up and treatment. She reports gr 1 fatigue, gr 1 hair loss, gr 1 chills, gr 1 nausea, gr 1 hot flashes, gr 1 insomnia, gr 1 pain/cramping to her stomach after infusions rated 2/10, gr 1 dyspnea, gr 1 headache, gr 1 urinary frequency, gr 1 urinary leakage.      FH:  Father with lung cancer; no breast; paternal aunt with ovarian cancer, no prostate or pancreas cancer    Past Medical History:   Diagnosis Date    Asthma     Cancer ([facility] [address] 06/2021    bilateral breast cancer    COVID-19 02/2021    GERD (gastroesophageal reflux disease)     Hyperlipemia     Hypertension       Past Surgical History:   Procedure Laterality Date    HX BREAST LUMPECTOMY Bilateral 6/29/2021    LEFT BREAST LUMPECTOMY WITH BRACKETED NEEDLE LOCALIZATION, LEFT BREAST SENTINEL NODE BIOPSY, RIGHT BREAST LUMPECTOMY WITH ULTRASOUND, RIGHT BREAST SENTINEL NODE BIOPSY performed by Norman Trevino MD at [address] HX ORTHOPAEDIC Left 2016    parital left knee arthroplasty    MO ABDOMEN SURGERY PROC UNLISTED  2006    lap nissen      Social History     Tobacco Use    Smoking status: Never Smoker    Smokeless tobacco: Never Used   Substance Use Topics    Alcohol use: Not Currently     Comment:  2 drinks per year      Family History   Problem Relation Age of Onset    Lung Cancer Father      Current Outpatient Medications   Medication Sig    prochlorperazine (COMPAZINE) 10 mg tablet Take 1 Tablet by mouth every six (6) hours as needed for Nausea.  lidocaine-prilocaine (EMLA) topical cream Apply  to affected area as needed for Pain.  ondansetron hcl (ZOFRAN) 8 mg tablet Take 1 Tablet by mouth every eight (8) hours as needed for Nausea.  dexAMETHasone (DECADRON) 4 mg tablet 8 mg PO bid the day before and day after chemo    rosuvastatin (CRESTOR) 20 mg tablet Take 20 mg by mouth daily.  cholecalciferol, vitamin D3, (Vitamin D3) 50 mcg (2,000 unit) tab Take 1 Tablet by mouth daily.  omeprazole (PRILOSEC) 20 mg capsule Take 20 mg by mouth daily.  celecoxib (CELEBREX) 200 mg capsule TAKE 1 CAPSULE BY MOUTH ONCE DAILY WITH FOOD FOR 30 DAYS    lisinopril-hydroCHLOROthiazide (PRINZIDE, ZESTORETIC) 20-25 mg per tablet Take 1 Tablet by mouth daily.  traMADoL (ULTRAM) 50 mg tablet Take 50 mg by mouth every six (6) hours as needed for Pain.  predniSONE (STERAPRED DS) 10 mg dose pack Take 1 Tablet by mouth See Admin Instructions. See administration instruction per 10mg dose pack (Patient not taking: Reported on 9/28/2021)     No current facility-administered medications for this visit. No Known Allergies     Review of Systems: A complete review of systems was obtained, negative except as described above.     Physical Exam:     Visit Vitals  /68   Pulse 73   Temp 97.3 °F (36.3 °C) (Temporal)   Resp 18   Ht 5' (1.524 m)   Wt 188 lb (85.3 kg)   SpO2 99%   BMI 36.72 kg/m²     ECOG PS: 0    Constitutional: Appears well-developed and well-nourished in no apparent distress      Mental status: Alert and awake, Oriented to person/place/time, Able to follow commands    Eyes: EOM normal, Sclera normal, No visible ocular discharge    HENT: Normocephalic, atraumatic    Mouth/Throat: Moist mucous membranes    External Ears normal    Neck: No visualized mass    Pulmonary/Chest: Respiratory effort normal, No visualized signs of difficulty breathing or respiratory distress    Musculoskeletal: Normal gait with no signs of ataxia, Normal range of motion of neck    Neurological: No facial asymmetry (Cranial nerve 7 motor function), No gaze palsy    Skin: No significant exanthematous lesions or discoloration noted on facial skin    Psychiatric: Normal affect, No hallucinations       Results:     Lab Results   Component Value Date/Time    WBC 12.9 (H) 09/28/2021 09:57 AM    HGB 11.8 09/28/2021 09:57 AM    HCT 36.2 09/28/2021 09:57 AM    PLATELET 443 77/60/5248 09:57 AM    MCV 90.5 09/28/2021 09:57 AM    ABS. NEUTROPHILS 10.4 (H) 09/28/2021 09:57 AM     Lab Results   Component Value Date/Time    Sodium 142 09/28/2021 09:57 AM    Potassium 4.1 09/28/2021 09:57 AM    Chloride 109 (H) 09/28/2021 09:57 AM    CO2 27 09/28/2021 09:57 AM    Glucose 93 09/28/2021 09:57 AM    BUN 20 09/28/2021 09:57 AM    Creatinine 0.66 09/28/2021 09:57 AM    GFR est AA >60 09/28/2021 09:57 AM    GFR est non-AA >60 09/28/2021 09:57 AM    Calcium 9.8 09/28/2021 09:57 AM     Lab Results   Component Value Date/Time    Bilirubin, total 0.5 09/07/2021 09:56 AM    ALT (SGPT) 26 09/07/2021 09:56 AM    Alk.  phosphatase 77 09/07/2021 09:56 AM    Protein, total 7.3 09/07/2021 09:56 AM    Albumin 3.9 09/07/2021 09:56 AM    Globulin 3.4 09/07/2021 09:56 AM     6/8/21 MRI breast  FINDINGS:      Background enhancement: Minimal.     Right Breast: At about the 8:00 position and middle depth of the right breast  there is a 5 x 6 x 8 mm enhancing nodule adjacent to the posterior aspect of  HydroMark clip signal void. There are no other enhancing lesions to suggest any  additional sites of invasive breast carcinoma.     Left Breast: There is a 7 x 9 x 11 mm mass with washout kinetics in the 12:00  position of the central left breast near the junction of the posterior middle  thirds. Slightly superior to this at 12:00 at approximately the junction of the  anterior middle thirds there is a 6 x 9 x 9 mm enhancing focus with some minimal  washout kinetics. There are no other spacious enhancing lesions identified.     Lymph: Unremarkable.     Chest wall and visualized abdomen: Unremarkable.     IMPRESSION  Known right breast carcinoma with no additional right breast lesions  identified. There are two suspicious enhancing masses left breast.     Right Breast:  BI-RADS Assessment Category 6: Known biopsy proven malignancy-  Appropriate action should be taken.     Left Breast:   BI-RADS Assessment Category 4: Suspicious abnormality - Biopsy  should be performed in the absence of clinical contraindication.     RECOMMENDATION: MRI guided left breast biopsy of two lesions. Records reviewed and summarized above. Pathology report(s) reviewed above. Radiology report(s) reviewed above. Assessment/plan:   1. Right breast LOQ IDC, 2.5 cm, 0/7 LN, gr 2, ER +, ME +, HER 2 negative, stage IIA, prognostic stage IA  High risk mammaprint    We explained to the patient that the goal of systemic adjuvant therapy is to improve the chances for cure and decrease the risk of relapse. We explained why a patient can have microscopic cancer spread now even though physical examination, laboratory studies and imaging studies are negative for cancer.  We explained that the same treatments used now as adjuvant or preventive treatments rarely if ever are curative in women who develop metastases. We discussed the potential value of Mammaprint testing. The [place] 70-gene prognostic profile (Mammaprint®), one of the first gene expression array-based prognosticators, classifies tumors as low-risk or high-risk for breast cancer recurrence. The clinical validity of the 70-gene prognostic profile has been demonstrated in multiple studies. Results from an international randomized trial, the Microarray in Node-Negative Disease May Avoid Chemotherapy (MINDACT) trial suggest that this genetic profile may identify subsets of patients who have a low likelihood of distant recurrence despite high-risk clinical features. In this trial, [id number] women, approximately [de-identified] percent of whom had lymph node-negative disease, underwent risk assessment by clinical criteria (using Adjuvant! Online) and by the 70-genetic profile. Patients with discordant clinical and genomic predictions were randomly assigned to receive or not receive adjuvant chemotherapy. Among patients in the intention-to-treat population who had a high clinical risk of recurrence but a low risk by [place] genetic profile, the five-year distant metastases-free survival rate was 95.9 percent with chemotherapy and 94.4 percent without chemotherapy (HR for distant metastasis or death 0.78, 95% CI 0.50-1.21). However, it should be noted that the MINDACT study was not powered to exclude a benefit of chemotherapy. In analysis of discordant groups in the intention-to-treat population, adjuvant chemotherapy was associated with improvement in the rate of distant metastasis-free survival of 2.8 and 2 percent, respectively, for the high clinical/low genomic and low clinical/high genomic risk groups, although these differences were also not statistically significant.    Based on MINDACT, [employer] has suggested Eric Cousin is one of several assays that might be used to determine prognosis for those with high clinical risk, hormone receptor-positive, HER2-negative breast cancer and no or limited (one to three) involved lymph nodes to inform decisions regarding withholding chemotherapy. High risk mammaprint, high risk mindact clinical disease, a discussion of chemotherapy is warranted    I discussed the potential risks of dose-dense chemotherapy with the patient. (DD AC-T, adriamycin 60 mg/m2; cyclophosphamide 600 mg/m2 q 2 weeks x 4; paclitaxel 80 mg/m2 qweekly x 12). Major toxicities include nausea and vomiting, stomatitis, fatigue, and a small risk of heart damage. Anemia frequently results and occasionally requires transfusions. Neutropenic fever is uncommon, but can be a life-threatening problem. Also, there is a small but increased risk of myelodysplasia and acute leukemia. We provided the patient with detailed information concerning toxicity including frequent toxicities that only last a few days, such as nausea, vomiting, mouth sores, arthralgia, myalgia, and potentially allergic reactions to paclitaxel, as well as toxicities which can be longer lasting including total alopecia, fatigue, anemia and neuropathy. We provided the patient with detailed information concerning the toxicities of their regimen in addition to our verbal discussion. We discussed the toxicities of TC chemotherapy (docetaxel 75mg/m2/cyclophosphamide 600 mg/m2 q 3 weeks x 4)  in detail. This chemotherapy frequently causes a low white blood cell count and a small percent of patients require hospital admission for treatment of neutropenic fever. We explained that on occasion we consider the use of growth factors to minimize this risk. We explained to the patient that some side effects, if they occur, only last a few days including nausea, vomiting, stomatitis, arthralgia, myalgia, and allergic reactions to Taxotere.  We told the patient that severe nausea and vomiting were very uncommon and that some side effects, if they occur, will last longer: this includes hair loss, which will be seen in all patients treated with these agents and fatigue, which will be seen in most. The patient was also told that if she is having menstrual function that she could develop chemotherapy-induced amenorrhea and infertility. We also told the patient that there was a very small risk of acute leukemia. We also informed the patient that heart damage is rare with these agents    Using predict, there is < 1% difference in OS between 2nd and 3rd gen chemo. I would then recommend TC for this situation. Cold caps discussed, she is using    Discussed risks of COVID19 and precautions to take. Strongly recommended vaccination, she has received her first dose    Discussed oral and peripheral cryotherapy, discussed neuropathy clinical trial and she has enrolled    After this discussion, she is agreeable to [facility] as above, she has signed informed consent  Dr. Thierry Goodman has placed port    The patient was given presciptions for a wig, emla cream, decadron to take 8 mg bid the day before and day after chemo, zofran and compazine. Neulasta the following day. She presents today for TC #3. Tolerating well with mild fatigue and nausea. Labs reviewed, will proceed with treatment today. Following chemotherapy, she is an excellent candidate for XRT and endocrine therapy    We will plan to see the patient in follow up at least once per cycle, or sooner if symptoms warrant. Labs with each cycle to monitor for toxicity    2. Left breast central ILC, gr 1, multifocal, 1.8 cm and 1.5 cm, ER +, VA +, HER 2 negative and ER +, VA negative, HER 2 negative, 0/8 LN involved, stage IA both anatomic and prognostic  Low risk mammaprint    See treatment discussion above for higher risk disease    3. Emotional well being:  She has excellent support and is coping well with her disease    4.  Genetic testing:   discussed potential ramifications of a positive test including the potential need for bilateral mastectomies and bilateral oophorectomies and the risk then for her family members to also have a mutation. VUS also discussed. invitae testing negative    5. Rash:  Due to taxotere; started 3-4 days after chemo; now improved; PO benadryl did not help; advised benadryl cream; will rx prednisone dose pack in case this occurs again     6. Urine frequency:  UA negative. She states this is due to increased water intake. 7. Fatigue/Dizziness: she feels blood glucose is low at times but has not checked blood glucose. She feels improved with eating so recommended eating small frequent meals. She is requesting IV fluids on 10/1/21, will order. I appreciate the opportunity to participate in Ms. Guadalupe Zarate's care. I have personally seen and evaluated the patient in conjunction with Tucker Bradford NP. I find the patient's history and physical exam are consistent with the NP's documentation. I agree with the above assessment and plan, which I have modified as needed. Proceed with C3 of TC. Advised her to monitor her dizziness and hot flash symptoms, which seem to occur on an empty stomach. Periodic hypoglycemia, stomach ulcer could both fit since she feels better after eating food. Could also be nausea related to chemo. Adding on for IVF on Friday. Signed By: Kely Gilbert MD      No orders of the defined types were placed in this encounter.

## 2021-10-01 ENCOUNTER — HOSPITAL ENCOUNTER (OUTPATIENT)
Dept: INFUSION THERAPY | Age: 61
Discharge: HOME | End: 2021-10-01
Payer: COMMERCIAL

## 2021-10-01 VITALS
HEART RATE: 84 BPM | BODY MASS INDEX: 37.05 KG/M2 | WEIGHT: 188.7 LBS | OXYGEN SATURATION: 98 % | SYSTOLIC BLOOD PRESSURE: 111 MMHG | DIASTOLIC BLOOD PRESSURE: 55 MMHG | TEMPERATURE: 98.3 F | HEIGHT: 60 IN | RESPIRATION RATE: 18 BRPM

## 2021-10-01 DIAGNOSIS — C50.912 BILATERAL MALIGNANT NEOPLASM OF BREAST IN FEMALE, ESTROGEN RECEPTOR POSITIVE, UNSPECIFIED SITE OF BREAST (HCC): Primary | ICD-10-CM

## 2021-10-01 DIAGNOSIS — Z17.0 MALIGNANT NEOPLASM OF OVERLAPPING SITES OF BOTH BREASTS IN FEMALE, ESTROGEN RECEPTOR POSITIVE (HCC): ICD-10-CM

## 2021-10-01 DIAGNOSIS — Z17.0 BILATERAL MALIGNANT NEOPLASM OF BREAST IN FEMALE, ESTROGEN RECEPTOR POSITIVE, UNSPECIFIED SITE OF BREAST (HCC): Primary | ICD-10-CM

## 2021-10-01 DIAGNOSIS — C50.812 MALIGNANT NEOPLASM OF OVERLAPPING SITES OF BOTH BREASTS IN FEMALE, ESTROGEN RECEPTOR POSITIVE (HCC): ICD-10-CM

## 2021-10-01 DIAGNOSIS — C50.811 MALIGNANT NEOPLASM OF OVERLAPPING SITES OF BOTH BREASTS IN FEMALE, ESTROGEN RECEPTOR POSITIVE (HCC): ICD-10-CM

## 2021-10-01 DIAGNOSIS — C50.911 BILATERAL MALIGNANT NEOPLASM OF BREAST IN FEMALE, ESTROGEN RECEPTOR POSITIVE, UNSPECIFIED SITE OF BREAST (HCC): Primary | ICD-10-CM

## 2021-10-01 PROCEDURE — 77030016057 HC NDL HUBR APOL -B

## 2021-10-01 PROCEDURE — 74011250636 HC RX REV CODE- 250/636: Performed by: NURSE PRACTITIONER

## 2021-10-01 PROCEDURE — 74011000250 HC RX REV CODE- 250: Performed by: NURSE PRACTITIONER

## 2021-10-01 PROCEDURE — 96360 HYDRATION IV INFUSION INIT: CPT

## 2021-10-01 RX ORDER — SODIUM CHLORIDE 0.9 % (FLUSH) 0.9 %
10 SYRINGE (ML) INJECTION AS NEEDED
Status: DISPENSED | OUTPATIENT
Start: 2021-10-01 | End: 2021-10-01

## 2021-10-01 RX ORDER — SODIUM CHLORIDE 9 MG/ML
10 INJECTION INTRAMUSCULAR; INTRAVENOUS; SUBCUTANEOUS AS NEEDED
Status: ACTIVE | OUTPATIENT
Start: 2021-10-01 | End: 2021-10-01

## 2021-10-01 RX ORDER — HEPARIN 100 UNIT/ML
300-500 SYRINGE INTRAVENOUS AS NEEDED
Status: ACTIVE | OUTPATIENT
Start: 2021-10-01 | End: 2021-10-01

## 2021-10-01 RX ADMIN — SODIUM CHLORIDE 10 ML: 9 INJECTION, SOLUTION INTRAMUSCULAR; INTRAVENOUS; SUBCUTANEOUS at 08:55

## 2021-10-01 RX ADMIN — SODIUM CHLORIDE 1000 ML: 9 INJECTION, SOLUTION INTRAVENOUS at 08:55

## 2021-10-01 RX ADMIN — Medication 500 UNITS: at 10:07

## 2021-10-01 RX ADMIN — Medication 10 ML: at 10:07

## 2021-10-01 NOTE — PROGRESS NOTES
[facility] Progress Note    Date: 2021    Name: Janet Mcmanus    MRN: [id number]         : 1960    Ms. Bentley Conley Arrived ambulatory and in no distress for Hydration. Assessment was completed, no acute issues at this time, no new complaints voiced. PAC accessed without difficulty with 1\" steiner needle, + blood. Ms. Amandeep Atwood vitals were reviewed. Visit Vitals  /69   Pulse 87   Temp 98.3 °F (36.8 °C)   Resp 18   Ht 5' (1.524 m)   Wt 85.6 kg (188 lb 11.2 oz)   SpO2 98%   BMI 36.85 kg/m²       Medications:  1 L Bolus     Ms. Bentley Conley tolerated treatment well and was discharged from [place] [address] in stable condition at 1015. Port de-accessed, flushed & heparinized per protocol. She is to return on  at 0900 for her next appointment.     [facility]  2021

## 2021-10-19 ENCOUNTER — HOSPITAL ENCOUNTER (OUTPATIENT)
Dept: INFUSION THERAPY | Age: 61
Discharge: HOME | End: 2021-10-19
Payer: COMMERCIAL

## 2021-10-19 ENCOUNTER — OFFICE VISIT (OUTPATIENT)
Dept: ONCOLOGY | Age: 61
End: 2021-10-19
Payer: COMMERCIAL

## 2021-10-19 VITALS
RESPIRATION RATE: 18 BRPM | HEART RATE: 88 BPM | HEIGHT: 60 IN | TEMPERATURE: 98 F | OXYGEN SATURATION: 97 % | BODY MASS INDEX: 36.91 KG/M2 | DIASTOLIC BLOOD PRESSURE: 74 MMHG | SYSTOLIC BLOOD PRESSURE: 130 MMHG | WEIGHT: 188 LBS

## 2021-10-19 VITALS — DIASTOLIC BLOOD PRESSURE: 65 MMHG | SYSTOLIC BLOOD PRESSURE: 137 MMHG | HEART RATE: 70 BPM

## 2021-10-19 DIAGNOSIS — Z17.0 MALIGNANT NEOPLASM OF OVERLAPPING SITES OF BOTH BREASTS IN FEMALE, ESTROGEN RECEPTOR POSITIVE (HCC): Primary | ICD-10-CM

## 2021-10-19 DIAGNOSIS — Z51.11 CHEMOTHERAPY MANAGEMENT, ENCOUNTER FOR: ICD-10-CM

## 2021-10-19 DIAGNOSIS — C50.812 MALIGNANT NEOPLASM OF OVERLAPPING SITES OF BOTH BREASTS IN FEMALE, ESTROGEN RECEPTOR POSITIVE (HCC): Primary | ICD-10-CM

## 2021-10-19 DIAGNOSIS — Z17.0 BILATERAL MALIGNANT NEOPLASM OF BREAST IN FEMALE, ESTROGEN RECEPTOR POSITIVE, UNSPECIFIED SITE OF BREAST (HCC): Primary | ICD-10-CM

## 2021-10-19 DIAGNOSIS — C50.811 MALIGNANT NEOPLASM OF OVERLAPPING SITES OF BOTH BREASTS IN FEMALE, ESTROGEN RECEPTOR POSITIVE (HCC): Primary | ICD-10-CM

## 2021-10-19 DIAGNOSIS — C50.911 BILATERAL MALIGNANT NEOPLASM OF BREAST IN FEMALE, ESTROGEN RECEPTOR POSITIVE, UNSPECIFIED SITE OF BREAST (HCC): Primary | ICD-10-CM

## 2021-10-19 DIAGNOSIS — C50.912 BILATERAL MALIGNANT NEOPLASM OF BREAST IN FEMALE, ESTROGEN RECEPTOR POSITIVE, UNSPECIFIED SITE OF BREAST (HCC): Primary | ICD-10-CM

## 2021-10-19 LAB
ALBUMIN SERPL-MCNC: 3.7 G/DL (ref 3.5–5)
ALBUMIN/GLOB SERPL: 1.2 {RATIO} (ref 1.1–2.2)
ALP SERPL-CCNC: 72 U/L (ref 45–117)
ALT SERPL-CCNC: 30 U/L (ref 12–78)
ANION GAP SERPL CALC-SCNC: 7 MMOL/L (ref 5–15)
AST SERPL-CCNC: 7 U/L (ref 15–37)
BASOPHILS # BLD: 0 K/UL (ref 0–0.1)
BASOPHILS NFR BLD: 0 % (ref 0–1)
BILIRUB SERPL-MCNC: 0.6 MG/DL (ref 0.2–1)
BUN SERPL-MCNC: 21 MG/DL (ref 6–20)
BUN/CREAT SERPL: 25 (ref 12–20)
CALCIUM SERPL-MCNC: 9.9 MG/DL (ref 8.5–10.1)
CHLORIDE SERPL-SCNC: 107 MMOL/L (ref 97–108)
CO2 SERPL-SCNC: 25 MMOL/L (ref 21–32)
CREAT SERPL-MCNC: 0.85 MG/DL (ref 0.55–1.02)
DIFFERENTIAL METHOD BLD: ABNORMAL
EOSINOPHIL # BLD: 0 K/UL (ref 0–0.4)
EOSINOPHIL NFR BLD: 0 % (ref 0–7)
ERYTHROCYTE [DISTWIDTH] IN BLOOD BY AUTOMATED COUNT: 17 % (ref 11.5–14.5)
GLOBULIN SER CALC-MCNC: 3.1 G/DL (ref 2–4)
GLUCOSE SERPL-MCNC: 103 MG/DL (ref 65–100)
HCT VFR BLD AUTO: 35.5 % (ref 35–47)
HGB BLD-MCNC: 11.7 G/DL (ref 11.5–16)
IMM GRANULOCYTES # BLD AUTO: 0.1 K/UL (ref 0–0.04)
IMM GRANULOCYTES NFR BLD AUTO: 1 % (ref 0–0.5)
LYMPHOCYTES # BLD: 1.1 K/UL (ref 0.8–3.5)
LYMPHOCYTES NFR BLD: 9 % (ref 12–49)
MCH RBC QN AUTO: 30.3 PG (ref 26–34)
MCHC RBC AUTO-ENTMCNC: 33 G/DL (ref 30–36.5)
MCV RBC AUTO: 92 FL (ref 80–99)
MONOCYTES # BLD: 1 K/UL (ref 0–1)
MONOCYTES NFR BLD: 8 % (ref 5–13)
NEUTS SEG # BLD: 10.8 K/UL (ref 1.8–8)
NEUTS SEG NFR BLD: 82 % (ref 32–75)
NRBC # BLD: 0 K/UL (ref 0–0.01)
NRBC BLD-RTO: 0 PER 100 WBC
PLATELET # BLD AUTO: 311 K/UL (ref 150–400)
PMV BLD AUTO: 9.5 FL (ref 8.9–12.9)
POTASSIUM SERPL-SCNC: 3.8 MMOL/L (ref 3.5–5.1)
PROT SERPL-MCNC: 6.8 G/DL (ref 6.4–8.2)
RBC # BLD AUTO: 3.86 M/UL (ref 3.8–5.2)
SODIUM SERPL-SCNC: 139 MMOL/L (ref 136–145)
WBC # BLD AUTO: 13 K/UL (ref 3.6–11)

## 2021-10-19 PROCEDURE — 80053 COMPREHEN METABOLIC PANEL: CPT

## 2021-10-19 PROCEDURE — 36415 COLL VENOUS BLD VENIPUNCTURE: CPT

## 2021-10-19 PROCEDURE — 74011250636 HC RX REV CODE- 250/636: Performed by: INTERNAL MEDICINE

## 2021-10-19 PROCEDURE — 96413 CHEMO IV INFUSION 1 HR: CPT

## 2021-10-19 PROCEDURE — 96377 APPLICATON ON-BODY INJECTOR: CPT

## 2021-10-19 PROCEDURE — 96417 CHEMO IV INFUS EACH ADDL SEQ: CPT

## 2021-10-19 PROCEDURE — 74011250637 HC RX REV CODE- 250/637: Performed by: NURSE PRACTITIONER

## 2021-10-19 PROCEDURE — 96361 HYDRATE IV INFUSION ADD-ON: CPT

## 2021-10-19 PROCEDURE — 85025 COMPLETE CBC W/AUTO DIFF WBC: CPT

## 2021-10-19 PROCEDURE — 99215 OFFICE O/P EST HI 40 MIN: CPT | Performed by: INTERNAL MEDICINE

## 2021-10-19 PROCEDURE — 77030016057 HC NDL HUBR APOL -B

## 2021-10-19 PROCEDURE — 96375 TX/PRO/DX INJ NEW DRUG ADDON: CPT

## 2021-10-19 RX ORDER — SODIUM CHLORIDE 9 MG/ML
10 INJECTION INTRAMUSCULAR; INTRAVENOUS; SUBCUTANEOUS AS NEEDED
Status: CANCELLED | OUTPATIENT
Start: 2021-10-22

## 2021-10-19 RX ORDER — SODIUM CHLORIDE 0.9 % (FLUSH) 0.9 %
10 SYRINGE (ML) INJECTION AS NEEDED
Status: CANCELLED | OUTPATIENT
Start: 2021-10-22

## 2021-10-19 RX ORDER — SODIUM CHLORIDE 0.9 % (FLUSH) 0.9 %
10 SYRINGE (ML) INJECTION AS NEEDED
Status: DISPENSED | OUTPATIENT
Start: 2021-10-19 | End: 2021-10-19

## 2021-10-19 RX ORDER — PALONOSETRON 0.05 MG/ML
0.25 INJECTION, SOLUTION INTRAVENOUS ONCE
Status: COMPLETED | OUTPATIENT
Start: 2021-10-19 | End: 2021-10-19

## 2021-10-19 RX ORDER — HEPARIN 100 UNIT/ML
300-500 SYRINGE INTRAVENOUS AS NEEDED
Status: ACTIVE | OUTPATIENT
Start: 2021-10-19 | End: 2021-10-19

## 2021-10-19 RX ORDER — HEPARIN 100 UNIT/ML
300-500 SYRINGE INTRAVENOUS AS NEEDED
Status: CANCELLED | OUTPATIENT
Start: 2021-10-22

## 2021-10-19 RX ORDER — DEXAMETHASONE SODIUM PHOSPHATE 100 MG/10ML
10 INJECTION INTRAMUSCULAR; INTRAVENOUS ONCE
Status: COMPLETED | OUTPATIENT
Start: 2021-10-19 | End: 2021-10-19

## 2021-10-19 RX ORDER — SODIUM CHLORIDE 9 MG/ML
25 INJECTION, SOLUTION INTRAVENOUS CONTINUOUS
Status: DISPENSED | OUTPATIENT
Start: 2021-10-19 | End: 2021-10-19

## 2021-10-19 RX ORDER — ANASTROZOLE 1 MG/1
1 TABLET ORAL DAILY
Qty: 90 TABLET | Refills: 3 | Status: SHIPPED | OUTPATIENT
Start: 2021-10-19 | End: 2022-04-18

## 2021-10-19 RX ORDER — SODIUM CHLORIDE 9 MG/ML
10 INJECTION INTRAMUSCULAR; INTRAVENOUS; SUBCUTANEOUS AS NEEDED
Status: ACTIVE | OUTPATIENT
Start: 2021-10-19 | End: 2021-10-19

## 2021-10-19 RX ORDER — ACETAMINOPHEN 325 MG/1
650 TABLET ORAL ONCE
Status: COMPLETED | OUTPATIENT
Start: 2021-10-19 | End: 2021-10-19

## 2021-10-19 RX ADMIN — DOCETAXEL ANHYDROUS 143 MG: 10 INJECTION, SOLUTION INTRAVENOUS at 13:23

## 2021-10-19 RX ADMIN — ACETAMINOPHEN 650 MG: 325 TABLET ORAL at 12:32

## 2021-10-19 RX ADMIN — DEXAMETHASONE SODIUM PHOSPHATE 10 MG: 10 INJECTION INTRAMUSCULAR; INTRAVENOUS at 12:14

## 2021-10-19 RX ADMIN — PALONOSETRON 0.25 MG: 0.05 INJECTION, SOLUTION INTRAVENOUS at 12:14

## 2021-10-19 RX ADMIN — SODIUM CHLORIDE 500 ML: 9 INJECTION, SOLUTION INTRAVENOUS at 12:36

## 2021-10-19 RX ADMIN — Medication 500 UNITS: at 15:44

## 2021-10-19 RX ADMIN — Medication 10 ML: at 15:44

## 2021-10-19 RX ADMIN — SODIUM CHLORIDE 25 ML/HR: 900 INJECTION, SOLUTION INTRAVENOUS at 12:13

## 2021-10-19 RX ADMIN — CYCLOPHOSPHAMIDE 1146 MG: 200 INJECTION, SOLUTION INTRAVENOUS at 14:24

## 2021-10-19 RX ADMIN — PEGFILGRASTIM 6 MG: KIT SUBCUTANEOUS at 15:44

## 2021-10-19 NOTE — PROGRESS NOTES
[facility] Progress Note    Date: 2021    Name: Sridevi Granado    MRN: [id number]         : 1960    Ms. Ashok Zapata Arrived ambulatory and in no distress for C4D1 of Docetaxel + Cytoxan Regimen. Assessment was completed by Valeria Haley RN,  no acute issues at this time, no new complaints voiced. PAC accessed without difficulty, labs drawn & sent for processing. Patient proceed to appointment with Dr. Josseline Briggs. Ms. Lazarus Gulling vitals were reviewed. Patient Vitals for the past 12 hrs:   Pulse BP   10/19/21 1557 70 137/65       Recent Results (from the past 12 hour(s))   CBC WITH AUTOMATED DIFF    Collection Time: 10/19/21  9:48 AM   Result Value Ref Range    WBC 13.0 (H) 3.6 - 11.0 K/uL    RBC 3.86 3.80 - 5.20 M/uL    HGB 11.7 11.5 - 16.0 g/dL    HCT 35.5 35.0 - 47.0 %    MCV 92.0 80.0 - 99.0 FL    MCH 30.3 26.0 - 34.0 PG    MCHC 33.0 30.0 - 36.5 g/dL    RDW 17.0 (H) 11.5 - 14.5 %    PLATELET 011 164 - 146 K/uL    MPV 9.5 8.9 - 12.9 FL    NRBC 0.0 0  WBC    ABSOLUTE NRBC 0.00 0.00 - 0.01 K/uL    NEUTROPHILS 82 (H) 32 - 75 %    LYMPHOCYTES 9 (L) 12 - 49 %    MONOCYTES 8 5 - 13 %    EOSINOPHILS 0 0 - 7 %    BASOPHILS 0 0 - 1 %    IMMATURE GRANULOCYTES 1 (H) 0.0 - 0.5 %    ABS. NEUTROPHILS 10.8 (H) 1.8 - 8.0 K/UL    ABS. LYMPHOCYTES 1.1 0.8 - 3.5 K/UL    ABS. MONOCYTES 1.0 0.0 - 1.0 K/UL    ABS. EOSINOPHILS 0.0 0.0 - 0.4 K/UL    ABS. BASOPHILS 0.0 0.0 - 0.1 K/UL    ABS. IMM.  GRANS. 0.1 (H) 0.00 - 0.04 K/UL    DF AUTOMATED     METABOLIC PANEL, COMPREHENSIVE    Collection Time: 10/19/21  9:48 AM   Result Value Ref Range    Sodium 139 136 - 145 mmol/L    Potassium 3.8 3.5 - 5.1 mmol/L    Chloride 107 97 - 108 mmol/L    CO2 25 21 - 32 mmol/L    Anion gap 7 5 - 15 mmol/L    Glucose 103 (H) 65 - 100 mg/dL    BUN 21 (H) 6 - 20 MG/DL    Creatinine 0.85 0.55 - 1.02 MG/DL    BUN/Creatinine ratio 25 (H) 12 - 20      GFR est AA >60 >60 ml/min/1.73m2    GFR est non-AA >60 >60 ml/min/1.73m2    Calcium 9.9 8.5 - 10.1 MG/DL    Bilirubin, total 0.6 0.2 - 1.0 MG/DL    ALT (SGPT) 30 12 - 78 U/L    AST (SGOT) 7 (L) 15 - 37 U/L    Alk.  phosphatase 72 45 - 117 U/L    Protein, total 6.8 6.4 - 8.2 g/dL    Albumin 3.7 3.5 - 5.0 g/dL    Globulin 3.1 2.0 - 4.0 g/dL    A-G Ratio 1.2 1.1 - 2.2         Medications:  Medications Administered     0.9% sodium chloride infusion     Admin Date  10/19/2021 Action  New Bag Dose  25 mL/hr Rate  25 mL/hr Route  IntraVENous Administered By  [place], [place]          acetaminophen (TYLENOL) tablet 650 mg     Admin Date  10/19/2021 Action  Given Dose  650 mg Route  Oral Administered By  [place], [place]          cyclophosphamide (CYTOXAN) 1,146 mg in 0.9% sodium chloride 250 mL, overfill volume 25 mL chemo infusion     Admin Date  10/19/2021 Action  New Bag Dose  1,146 mg Rate  561.4 mL/hr Route  IntraVENous Administered By  [place], [place]          dexamethasone (DECADRON) 10 mg/mL injection 10 mg     Admin Date  10/19/2021 Action  Given Dose  10 mg Route  IntraVENous Administered By  [place], [place]          DOCEtaxeL (TAXOTERE) 143 mg in 0.9% sodium chloride 250 mL, overfill volume 25 mL chemo infusion     Admin Date  10/19/2021 Action  New Bag Dose  143 mg Rate  289.3 mL/hr Route  IntraVENous Administered By  [place], [place]          heparin (porcine) pf 300-500 Units     Admin Date  10/19/2021 Action  Given Dose  500 Units Route  InterCATHeter Administered By  [place], [place]          palonosetron HCl (ALOXI) injection 0.25 mg     Admin Date  10/19/2021 Action  Given Dose  0.25 mg Route  IntraVENous Administered By  [place], [place]          pegfilgrastim (NEULASTA) wearable SQ injector 6 mg     Admin Date  10/19/2021 Action  Given Dose  6 mg Route  SubCUTAneous Administered By  [place], [place]          sodium chloride (NS) flush 10 mL     Admin Date  10/19/2021 Action  Given Dose  10 mL Route  IntraVENous Administered By  [place], [place]          sodium chloride 0.9 % bolus infusion 500 mL     Admin Date  10/19/2021 Action  New Bag Dose  500 mL Route  IntraVENous Administered By  [place], [place]              Prior to cooling the patient's hair was dampened with water and hair conditioner was applied to improve scalp contact and reduce the insulation effect of hair. Pre-cooling time: Cold cap therapy initiated 30 minutes prior to Taxotere infusion. Infusion time: Taxotere and Cytoxan infusions    Post infusion cooling time: Cold cap therapy continued 90 minutes post Cytoxan infusion. Ms. Snehal Matos tolerated treatment well and was discharged from [place] [address] in stable condition at 1640. Port de-accessed, flushed & heparinized per protocol. She is to return on October 22 at 1100 for her next appointment.     [facility]  October 19, 2021

## 2021-10-19 NOTE — PROGRESS NOTES
Tam Gates is a 64 y.o. female Follow up for the evaluation of breast cancer. 1. Have you been to the ER, urgent care clinic since your last visit? Hospitalized since your last visit? No    2. Have you seen or consulted any other health care providers outside of the [address] [place] [address] since your last visit? Include any pap smears or colon screening.  No

## 2021-10-19 NOTE — PROGRESS NOTES
[facility] [place] at [place] [address] [address] [facility]  W: [phone]  F: [phone]      Reason for Visit:   Gracia Lutz is a 64 y.o. female who is seen in follow up for breast cancer    Rad onc:  Dr. Jenny Clark  Breast Surgeon: Dr. Ramya Perkins    Treatment History:   · 5/24/21 right breast mass core bx:  IDC, 7 mm, gr 1-2, no LVI, ER + at 100%, IA + at 95%, HER 2 negative at [facility] 1+, ki67 < 5%  · MRI bx, 6/16/21 breast left site A, posterior bx: ILC, gr 1, 6 mm, ER + at 99%, IA + at 60%, HER 2 negative at IHC 1+, ki67 19%; left breast site B, anterior bx:  ILC, gr 1, 1.1 cm, ER + at 99%, IA negative, HER 2 negative (IHC 2+; FISH ratio 1.1 ; sig/cell 2); ki67 18%  · 6/29/21 right breast lumpectomy:  IDC, 2.5 cm, gr 1, DCIS, gr 2, focal 2 mm, 0/7 LN, pT2 pN0 cM0; left breast lumpectomy:  Multifocal ILC, gr 1, 1.8 cm and 1.5 cm, 0/8 LN, pT1c pN0 cM0  · Mammaprint, right breast, high-risk, luminal type B  · Mammaprint, left breast, site B, low-risk, luminal type A  · 9/4/21 invitae genetic testing, negative  · TC 8/17/21- 10/19/21    History of Present Illness: An abnormal right mammogram led to the pathology above    Interval history: Patient presents today for follow up and treatment. She reports gr 1 fatigue, gr 1 hair loss, gr 1 nausea, gr 1 hot flashes, gr 1 insomnia, gr 1 anxiety/depression, gr 1 dyspnea, gr 1 tachycardia, gr 1 neuropathy, gr 1 headache, gr 1 urinary frequency.      FH:  Father with lung cancer; no breast; paternal aunt with ovarian cancer, no prostate or pancreas cancer    Past Medical History:   Diagnosis Date    Asthma     Cancer ([facility] [address] 06/2021    bilateral breast cancer    COVID-19 02/2021    GERD (gastroesophageal reflux disease)     Hyperlipemia     Hypertension       Past Surgical History:   Procedure Laterality Date    HX BREAST LUMPECTOMY Bilateral 6/29/2021    LEFT BREAST LUMPECTOMY WITH BRACKETED NEEDLE LOCALIZATION, LEFT BREAST SENTINEL NODE BIOPSY, RIGHT BREAST LUMPECTOMY WITH ULTRASOUND, RIGHT BREAST SENTINEL NODE BIOPSY performed by Primitivo Cohn MD at [address] HX ORTHOPAEDIC Left 2016    parital left knee arthroplasty    NM ABDOMEN SURGERY PROC UNLISTED  2006    lap nissen      Social History     Tobacco Use    Smoking status: Never Smoker    Smokeless tobacco: Never Used   Substance Use Topics    Alcohol use: Not Currently     Comment:  2 drinks per year      Family History   Problem Relation Age of Onset    Lung Cancer Father      Current Outpatient Medications   Medication Sig    anastrozole (ARIMIDEX) 1 mg tablet Take 1 mg by mouth daily. To start 1 week after completion of radiation    prochlorperazine (COMPAZINE) 10 mg tablet Take 1 Tablet by mouth every six (6) hours as needed for Nausea.  lidocaine-prilocaine (EMLA) topical cream Apply  to affected area as needed for Pain.  ondansetron hcl (ZOFRAN) 8 mg tablet Take 1 Tablet by mouth every eight (8) hours as needed for Nausea.  dexAMETHasone (DECADRON) 4 mg tablet 8 mg PO bid the day before and day after chemo    rosuvastatin (CRESTOR) 20 mg tablet Take 20 mg by mouth daily.  cholecalciferol, vitamin D3, (Vitamin D3) 50 mcg (2,000 unit) tab Take 1 Tablet by mouth daily.  omeprazole (PRILOSEC) 20 mg capsule Take 20 mg by mouth daily.  celecoxib (CELEBREX) 200 mg capsule TAKE 1 CAPSULE BY MOUTH ONCE DAILY WITH FOOD FOR 30 DAYS    lisinopril-hydroCHLOROthiazide (PRINZIDE, ZESTORETIC) 20-25 mg per tablet Take 1 Tablet by mouth daily.  traMADoL (ULTRAM) 50 mg tablet Take 50 mg by mouth every six (6) hours as needed for Pain. No current facility-administered medications for this visit. No Known Allergies     Review of Systems: A complete review of systems was obtained, negative except as described above.     Physical Exam:     Visit Vitals  /74   Pulse 88   Temp 98 °F (36.7 °C) (Temporal)   Resp 18   Ht 5' (1.524 m) Wt 188 lb (85.3 kg)   SpO2 97%   BMI 36.72 kg/m²     ECOG PS: 0    Constitutional: Appears well-developed and well-nourished in no apparent distress      Mental status: Alert and awake, Oriented to person/place/time, Able to follow commands    Eyes: EOM normal, Sclera normal, No visible ocular discharge    HENT: Normocephalic, atraumatic    Mouth/Throat: Moist mucous membranes    External Ears normal    Neck: No visualized mass    Pulmonary/Chest: Respiratory effort normal, No visualized signs of difficulty breathing or respiratory distress    Musculoskeletal: Normal gait with no signs of ataxia, Normal range of motion of neck    Neurological: No facial asymmetry (Cranial nerve 7 motor function), No gaze palsy    Skin: No significant exanthematous lesions or discoloration noted on facial skin    Psychiatric: Normal affect, No hallucinations       Results:     Lab Results   Component Value Date/Time    WBC 12.9 (H) 09/28/2021 09:57 AM    HGB 11.8 09/28/2021 09:57 AM    HCT 36.2 09/28/2021 09:57 AM    PLATELET 652 61/72/3801 09:57 AM    MCV 90.5 09/28/2021 09:57 AM    ABS. NEUTROPHILS 10.4 (H) 09/28/2021 09:57 AM     Lab Results   Component Value Date/Time    Sodium 142 09/28/2021 09:57 AM    Potassium 4.1 09/28/2021 09:57 AM    Chloride 109 (H) 09/28/2021 09:57 AM    CO2 27 09/28/2021 09:57 AM    Glucose 93 09/28/2021 09:57 AM    BUN 20 09/28/2021 09:57 AM    Creatinine 0.66 09/28/2021 09:57 AM    GFR est AA >60 09/28/2021 09:57 AM    GFR est non-AA >60 09/28/2021 09:57 AM    Calcium 9.8 09/28/2021 09:57 AM     Lab Results   Component Value Date/Time    Bilirubin, total 0.5 09/28/2021 09:57 AM    ALT (SGPT) 27 09/28/2021 09:57 AM    Alk.  phosphatase 76 09/28/2021 09:57 AM    Protein, total 7.1 09/28/2021 09:57 AM    Albumin 3.8 09/28/2021 09:57 AM    Globulin 3.3 09/28/2021 09:57 AM     6/8/21 MRI breast  FINDINGS:      Background enhancement: Minimal.     Right Breast: At about the 8:00 position and middle depth of the right breast  there is a 5 x 6 x 8 mm enhancing nodule adjacent to the posterior aspect of  HydroMark clip signal void. There are no other enhancing lesions to suggest any  additional sites of invasive breast carcinoma.     Left Breast: There is a 7 x 9 x 11 mm mass with washout kinetics in the 12:00  position of the central left breast near the junction of the posterior middle  thirds. Slightly superior to this at 12:00 at approximately the junction of the  anterior middle thirds there is a 6 x 9 x 9 mm enhancing focus with some minimal  washout kinetics. There are no other spacious enhancing lesions identified.     Lymph: Unremarkable.     Chest wall and visualized abdomen: Unremarkable.     IMPRESSION  Known right breast carcinoma with no additional right breast lesions  identified. There are two suspicious enhancing masses left breast.     Right Breast:  BI-RADS Assessment Category 6: Known biopsy proven malignancy-  Appropriate action should be taken.     Left Breast:   BI-RADS Assessment Category 4: Suspicious abnormality - Biopsy  should be performed in the absence of clinical contraindication.     RECOMMENDATION: MRI guided left breast biopsy of two lesions. Records reviewed and summarized above. Pathology report(s) reviewed above. Radiology report(s) reviewed above. Assessment/plan:   1. Right breast LOQ IDC, 2.5 cm, 0/7 LN, gr 2, ER +, GA +, HER 2 negative, stage IIA, prognostic stage IA  High risk mammaprint    We explained to the patient that the goal of systemic adjuvant therapy is to improve the chances for cure and decrease the risk of relapse. We explained why a patient can have microscopic cancer spread now even though physical examination, laboratory studies and imaging studies are negative for cancer. We explained that the same treatments used now as adjuvant or preventive treatments rarely if ever are curative in women who develop metastases.      We discussed the potential value of Mammaprint testing. The [place] 70-gene prognostic profile (Mammaprint®), one of the first gene expression array-based prognosticators, classifies tumors as low-risk or high-risk for breast cancer recurrence. The clinical validity of the 70-gene prognostic profile has been demonstrated in multiple studies. Results from an international randomized trial, the Microarray in Node-Negative Disease May Avoid Chemotherapy (MINDACT) trial suggest that this genetic profile may identify subsets of patients who have a low likelihood of distant recurrence despite high-risk clinical features. In this trial, [id number] women, approximately [de-identified] percent of whom had lymph node-negative disease, underwent risk assessment by clinical criteria (using Adjuvant! Online) and by the 70-genetic profile. Patients with discordant clinical and genomic predictions were randomly assigned to receive or not receive adjuvant chemotherapy. Among patients in the intention-to-treat population who had a high clinical risk of recurrence but a low risk by Spray genetic profile, the five-year distant metastases-free survival rate was 95.9 percent with chemotherapy and 94.4 percent without chemotherapy (HR for distant metastasis or death 0.78, 95% CI 0.50-1.21). However, it should be noted that the MINDACT study was not powered to exclude a benefit of chemotherapy. In analysis of discordant groups in the intention-to-treat population, adjuvant chemotherapy was associated with improvement in the rate of distant metastasis-free survival of 2.8 and 2 percent, respectively, for the high clinical/low genomic and low clinical/high genomic risk groups, although these differences were also not statistically significant.    Based on MINDACT, [employer] has suggested Brigitte Cooley is one of several assays that might be used to determine prognosis for those with high clinical risk, hormone receptor-positive, HER2-negative breast cancer and no or limited (one to three) involved lymph nodes to inform decisions regarding withholding chemotherapy. High risk mammaprint, high risk mindact clinical disease, a discussion of chemotherapy is warranted    I discussed the potential risks of dose-dense chemotherapy with the patient. (DD AC-T, adriamycin 60 mg/m2; cyclophosphamide 600 mg/m2 q 2 weeks x 4; paclitaxel 80 mg/m2 qweekly x 12). Major toxicities include nausea and vomiting, stomatitis, fatigue, and a small risk of heart damage. Anemia frequently results and occasionally requires transfusions. Neutropenic fever is uncommon, but can be a life-threatening problem. Also, there is a small but increased risk of myelodysplasia and acute leukemia. We provided the patient with detailed information concerning toxicity including frequent toxicities that only last a few days, such as nausea, vomiting, mouth sores, arthralgia, myalgia, and potentially allergic reactions to paclitaxel, as well as toxicities which can be longer lasting including total alopecia, fatigue, anemia and neuropathy. We provided the patient with detailed information concerning the toxicities of their regimen in addition to our verbal discussion. We discussed the toxicities of TC chemotherapy (docetaxel 75mg/m2/cyclophosphamide 600 mg/m2 q 3 weeks x 4)  in detail. This chemotherapy frequently causes a low white blood cell count and a small percent of patients require hospital admission for treatment of neutropenic fever. We explained that on occasion we consider the use of growth factors to minimize this risk. We explained to the patient that some side effects, if they occur, only last a few days including nausea, vomiting, stomatitis, arthralgia, myalgia, and allergic reactions to Taxotere.  We told the patient that severe nausea and vomiting were very uncommon and that some side effects, if they occur, will last longer: this includes hair loss, which will be seen in all patients treated with these agents and fatigue, which will be seen in most. The patient was also told that if she is having menstrual function that she could develop chemotherapy-induced amenorrhea and infertility. We also told the patient that there was a very small risk of acute leukemia. We also informed the patient that heart damage is rare with these agents    Using predict, there is < 1% difference in OS between 2nd and 3rd gen chemo. I would then recommend TC for this situation. Cold caps discussed, she is using    Discussed risks of COVID19 and precautions to take. Strongly recommended vaccination, she has received her first dose    Discussed oral and peripheral cryotherapy, discussed neuropathy clinical trial and she has enrolled    After this discussion, she is agreeable to [facility] as above, she has signed informed consent  Dr. Jeanie Knapp has placed port    The patient was given presciptions for a wig, emla cream, decadron to take 8 mg bid the day before and day after chemo, zofran and compazine. Neulasta the following day. She presents today for TC #4. Tolerating well with mild fatigue and nausea. Labs reviewed, will proceed with treatment today. Following chemotherapy, she is an excellent candidate for XRT and endocrine therapy. She will call radiation to make appointment. The risks and benefits of aromatase inhibitors (anastrozole, letrozole, and exemestane) were discussed in detail and the patient was informed of the following: Risks include the development of painful muscles and joints (arthralgia/myalgia) and bone loss. Muscle and joint pain can be severe but rarely result in any tissue damage; symptoms usually resolve in several weeks when the medication is stopped. Bone loss is common and a bone density test is recommended as a baseline and then yearly to every several years depending on initial results.  The risk of fractures is increased by a few percent in patients taking these drugs, but careful monitoring of bone density and using bone protecting agents when indicated can minimize these risks. Unlike tamoxifen there is no increased risk of blood clots or endometrial cancer. AIs can cause or worsen vaginal dryness but women using these drugs should not use vaginal estrogen preparations for these symptoms. AIs can also cause or increase hot flashes. Any other symptoms should be reported. She is agreeable to anastrozole this, rx in. To start one week after the completion of XRT. Port may be removed after 3 weeks    2. Left breast central ILC, gr 1, multifocal, 1.8 cm and 1.5 cm, ER +, KS +, HER 2 negative and ER +, KS negative, HER 2 negative, 0/8 LN involved, stage IA both anatomic and prognostic  Low risk mammaprint    See treatment discussion above for higher risk disease    3. Emotional well being:  She has excellent support and is coping well with her disease    4. Genetic testing:   discussed potential ramifications of a positive test including the potential need for bilateral mastectomies and bilateral oophorectomies and the risk then for her family members to also have a mutation. VUS also discussed. invitae testing negative    5. Rash:  Due to taxotere; started 3-4 days after chemo; now improved; PO benadryl did not help; advised benadryl cream; have rx prednisone dose pack in case this occurs again     6. Urine frequency:  UA negative. She states this is due to increased water intake. 7. Fatigue/Dizziness: she feels blood glucose is low at times but has not checked blood glucose. She feels improved with eating so recommended eating small frequent meals. She is requesting IV fluids on 10/22/21, will order. 8. Rapid Heart Rate: patient reports flutter to chest on occasion she feels is due to anxiety. Denies concurrent chest pain or dyspnea. She was seen by PCP since this occurred. None today. Will get EKG this week, she will get this on Friday    9. Osteopenia: per DEXA on 4/23/21.  She is taking 2000 international units of vitamin D3 and 2-3 servings of dietary calcium. Will discuss zometa at her next visit    10. Neuropathy: she had some mild numbness to fingers for about three days but now resolved. This patient was seen in conjunction with Reyes Cha NP. I personally reviewed the history and all points in the assessment and plan, and performed key points on the exam.   Bilateral ER + breast cancer, right breast high risk, on TC #4 today. Will get EKG this week for tachycardia. She will proceed with XRT next. Will start anastrozole after XRT. RTc 3 months    I appreciate the opportunity to participate in Ms. Guadalupe Zarate's care. Signed By: Elke Verdin MD      No orders of the defined types were placed in this encounter.

## 2021-10-22 ENCOUNTER — HOSPITAL ENCOUNTER (OUTPATIENT)
Dept: INFUSION THERAPY | Age: 61
Discharge: HOME | End: 2021-10-22
Payer: COMMERCIAL

## 2021-10-22 VITALS
HEART RATE: 81 BPM | SYSTOLIC BLOOD PRESSURE: 116 MMHG | TEMPERATURE: 97.7 F | OXYGEN SATURATION: 99 % | RESPIRATION RATE: 18 BRPM | DIASTOLIC BLOOD PRESSURE: 66 MMHG

## 2021-10-22 DIAGNOSIS — C50.912 BILATERAL MALIGNANT NEOPLASM OF BREAST IN FEMALE, ESTROGEN RECEPTOR POSITIVE, UNSPECIFIED SITE OF BREAST (HCC): Primary | ICD-10-CM

## 2021-10-22 DIAGNOSIS — Z17.0 MALIGNANT NEOPLASM OF OVERLAPPING SITES OF BOTH BREASTS IN FEMALE, ESTROGEN RECEPTOR POSITIVE (HCC): ICD-10-CM

## 2021-10-22 DIAGNOSIS — C50.911 BILATERAL MALIGNANT NEOPLASM OF BREAST IN FEMALE, ESTROGEN RECEPTOR POSITIVE, UNSPECIFIED SITE OF BREAST (HCC): Primary | ICD-10-CM

## 2021-10-22 DIAGNOSIS — C50.812 MALIGNANT NEOPLASM OF OVERLAPPING SITES OF BOTH BREASTS IN FEMALE, ESTROGEN RECEPTOR POSITIVE (HCC): ICD-10-CM

## 2021-10-22 DIAGNOSIS — Z17.0 BILATERAL MALIGNANT NEOPLASM OF BREAST IN FEMALE, ESTROGEN RECEPTOR POSITIVE, UNSPECIFIED SITE OF BREAST (HCC): Primary | ICD-10-CM

## 2021-10-22 DIAGNOSIS — C50.811 MALIGNANT NEOPLASM OF OVERLAPPING SITES OF BOTH BREASTS IN FEMALE, ESTROGEN RECEPTOR POSITIVE (HCC): ICD-10-CM

## 2021-10-22 PROCEDURE — 96360 HYDRATION IV INFUSION INIT: CPT

## 2021-10-22 PROCEDURE — 77030012965 HC NDL HUBR BBMI -A

## 2021-10-22 PROCEDURE — 74011250636 HC RX REV CODE- 250/636: Performed by: NURSE PRACTITIONER

## 2021-10-22 RX ORDER — SODIUM CHLORIDE 0.9 % (FLUSH) 0.9 %
10 SYRINGE (ML) INJECTION AS NEEDED
Status: DISPENSED | OUTPATIENT
Start: 2021-10-22 | End: 2021-10-22

## 2021-10-22 RX ORDER — SODIUM CHLORIDE 9 MG/ML
10 INJECTION INTRAMUSCULAR; INTRAVENOUS; SUBCUTANEOUS AS NEEDED
Status: ACTIVE | OUTPATIENT
Start: 2021-10-22 | End: 2021-10-22

## 2021-10-22 RX ORDER — HEPARIN 100 UNIT/ML
300-500 SYRINGE INTRAVENOUS AS NEEDED
Status: ACTIVE | OUTPATIENT
Start: 2021-10-22 | End: 2021-10-22

## 2021-10-22 RX ADMIN — SODIUM CHLORIDE 10 ML: 9 INJECTION INTRAMUSCULAR; INTRAVENOUS; SUBCUTANEOUS at 12:36

## 2021-10-22 RX ADMIN — HEPARIN 500 UNITS: 100 SYRINGE at 12:37

## 2021-10-22 RX ADMIN — SODIUM CHLORIDE 10 ML: 9 INJECTION INTRAMUSCULAR; INTRAVENOUS; SUBCUTANEOUS at 11:34

## 2021-10-22 RX ADMIN — Medication 10 ML: at 11:33

## 2021-10-22 RX ADMIN — SODIUM CHLORIDE 1000 ML: 9 INJECTION, SOLUTION INTRAVENOUS at 11:34

## 2021-10-22 NOTE — PROGRESS NOTES
[facility]   Visit Progress Note    1120 Patient admitted to [facility] for hydration in stable condition. Assessment completed. Pt reports mild nausea, but declines offer of antiemetic       VS  Patient Vitals for the past 12 hrs:   Temp Pulse Resp BP SpO2   10/22/21 1238  81  116/66    10/22/21 1124 97.7 °F (36.5 °C) 82 18 92/60 99 %         PAC with positive blood return. Medications:  Medications Administered     0.9% sodium chloride injection 10 mL     Admin Date  10/22/2021 Action  Given Dose  10 mL Route  IntraVENous Administered By  Ren Martin RN           Admin Date  10/22/2021 Action  Given Dose  10 mL Route  IntraVENous Administered By  Ren Martin RN          heparin (porcine) pf 300-500 Units     Admin Date  10/22/2021 Action  Given Dose  500 Units Route  InterCATHeter Administered By  Ren Martin RN          sodium chloride (NS) flush 10 mL     Admin Date  10/22/2021 Action  Given Dose  10 mL Route  IntraVENous Administered By  Ren Martin RN          sodium chloride 0.9 % bolus infusion 1,000 mL     Admin Date  10/22/2021 Action  New Bag Dose  1,000 mL Rate  1,000 mL/hr Route  IntraVENous Administered By  Ren Martin RN                  [id number] Patient tolerated treatment well. Patient discharged from [place] [address] in no distress.

## 2021-10-26 ENCOUNTER — NURSE NAVIGATOR (OUTPATIENT)
Dept: CASE MANAGEMENT | Age: 61
End: 2021-10-26

## 2021-10-26 RX ORDER — SODIUM CHLORIDE 9 MG/ML
10 INJECTION INTRAMUSCULAR; INTRAVENOUS; SUBCUTANEOUS AS NEEDED
Status: CANCELLED | OUTPATIENT
Start: 2021-10-27

## 2021-10-26 RX ORDER — HEPARIN 100 UNIT/ML
300-500 SYRINGE INTRAVENOUS AS NEEDED
Status: CANCELLED | OUTPATIENT
Start: 2021-10-27

## 2021-10-26 RX ORDER — SODIUM CHLORIDE 0.9 % (FLUSH) 0.9 %
10 SYRINGE (ML) INJECTION AS NEEDED
Status: CANCELLED | OUTPATIENT
Start: 2021-10-27

## 2021-10-26 NOTE — NURSE NAVIGATOR
[employer]  Breast Navigator Encounter    Name: Kunal Oneil  Age: 64 y.o.  : 1960  Diagnosis: Right breast IDC; ER+/DE+/HER2-;  Stage IIA; High risk mammaprint    Encounter type:  []Patient Initiated  [x]Navigator Follow-up []Pre-op  []Post-op  []Check-in Prior to First Treatment [x]Treatment Modality Change  []Survivorship Transition []Other:     Narrative:   Called pt to check in s/p last cycle of TC. She states she had her XRT consult and is scheduled for CT sim on  and will start tx on . No questions about that at this time. We discussed overall wellbeing and Ms. Derek Morris reports constant dizziness and feeling as though her head is underwater. She states this is interfering with ADLs and preventing her from working. Discussed with Dr. Afsaneh Jones and team. IVF and labs ordered. Appointment scheduled at [facility] for 10/27 at [address]. Ms. Derek Morris updated. She confirmed having transportation available and confirmed appointment. Ms. Derek Morris also asked about hair care s/p cold capping. Told her that, per Maykel's post-chemo hair care guide, she may return to normal hair care routine once shedding stops. Shedding can persist for a number of weeks, however if longer than 2 months then discuss with your doctor as additional factors can cause hair loss. She verbalizes understanding.        Interdisciplinary Team:  Med-Onc: Dr. Sofi Araiza MD  Surg-Onc: Dr. Los Sheffield  Rad-Onc: Dr. Azam Joy MD  Plastics:  : Ella Silverman LCSW  Nurse Navigator:  LUKE Rosenberg, RN, LUKE Newberry, RN, [facility]  Oncology Breast Navigator    [employer]  [address] [place] [address]  W: [phone]  F: [phone]  [email or web address]   Good Help to Those in [facility]

## 2021-10-27 ENCOUNTER — HOSPITAL ENCOUNTER (OUTPATIENT)
Dept: INFUSION THERAPY | Age: 61
Discharge: HOME | End: 2021-10-27
Payer: COMMERCIAL

## 2021-10-27 VITALS
HEART RATE: 77 BPM | WEIGHT: 187.1 LBS | BODY MASS INDEX: 36.54 KG/M2 | TEMPERATURE: 97.7 F | RESPIRATION RATE: 18 BRPM | SYSTOLIC BLOOD PRESSURE: 111 MMHG | DIASTOLIC BLOOD PRESSURE: 69 MMHG

## 2021-10-27 DIAGNOSIS — C50.812 MALIGNANT NEOPLASM OF OVERLAPPING SITES OF BOTH BREASTS IN FEMALE, ESTROGEN RECEPTOR POSITIVE (HCC): Primary | ICD-10-CM

## 2021-10-27 DIAGNOSIS — Z17.0 MALIGNANT NEOPLASM OF OVERLAPPING SITES OF BOTH BREASTS IN FEMALE, ESTROGEN RECEPTOR POSITIVE (HCC): Primary | ICD-10-CM

## 2021-10-27 DIAGNOSIS — C50.811 MALIGNANT NEOPLASM OF OVERLAPPING SITES OF BOTH BREASTS IN FEMALE, ESTROGEN RECEPTOR POSITIVE (HCC): Primary | ICD-10-CM

## 2021-10-27 LAB
ALBUMIN SERPL-MCNC: 3.5 G/DL (ref 3.5–5)
ALBUMIN/GLOB SERPL: 1.1 {RATIO} (ref 1.1–2.2)
ALP SERPL-CCNC: 75 U/L (ref 45–117)
ALT SERPL-CCNC: 23 U/L (ref 12–78)
ANION GAP SERPL CALC-SCNC: 5 MMOL/L (ref 5–15)
AST SERPL-CCNC: 11 U/L (ref 15–37)
BASOPHILS # BLD: 0.1 K/UL (ref 0–0.1)
BASOPHILS NFR BLD: 2 % (ref 0–1)
BILIRUB SERPL-MCNC: 0.4 MG/DL (ref 0.2–1)
BUN SERPL-MCNC: 13 MG/DL (ref 6–20)
BUN/CREAT SERPL: 19 (ref 12–20)
CALCIUM SERPL-MCNC: 9.4 MG/DL (ref 8.5–10.1)
CHLORIDE SERPL-SCNC: 104 MMOL/L (ref 97–108)
CO2 SERPL-SCNC: 29 MMOL/L (ref 21–32)
CREAT SERPL-MCNC: 0.68 MG/DL (ref 0.55–1.02)
DIFFERENTIAL METHOD BLD: ABNORMAL
EOSINOPHIL # BLD: 0.3 K/UL (ref 0–0.4)
EOSINOPHIL NFR BLD: 5 % (ref 0–7)
ERYTHROCYTE [DISTWIDTH] IN BLOOD BY AUTOMATED COUNT: 16.6 % (ref 11.5–14.5)
GLOBULIN SER CALC-MCNC: 3.2 G/DL (ref 2–4)
GLUCOSE SERPL-MCNC: 81 MG/DL (ref 65–100)
HCT VFR BLD AUTO: 36.8 % (ref 35–47)
HGB BLD-MCNC: 12 G/DL (ref 11.5–16)
IMM GRANULOCYTES # BLD AUTO: 0 K/UL
IMM GRANULOCYTES NFR BLD AUTO: 0 %
LYMPHOCYTES # BLD: 1.4 K/UL (ref 0.8–3.5)
LYMPHOCYTES NFR BLD: 23 % (ref 12–49)
MCH RBC QN AUTO: 29.9 PG (ref 26–34)
MCHC RBC AUTO-ENTMCNC: 32.6 G/DL (ref 30–36.5)
MCV RBC AUTO: 91.8 FL (ref 80–99)
METAMYELOCYTES NFR BLD MANUAL: 3 %
MONOCYTES # BLD: 0.9 K/UL (ref 0–1)
MONOCYTES NFR BLD: 16 % (ref 5–13)
MYELOCYTES NFR BLD MANUAL: 2 %
NEUTS BAND NFR BLD MANUAL: 2 % (ref 0–6)
NEUTS SEG # BLD: 2.8 K/UL (ref 1.8–8)
NEUTS SEG NFR BLD: 46 % (ref 32–75)
NRBC # BLD: 0.04 K/UL (ref 0–0.01)
NRBC BLD-RTO: 0.7 PER 100 WBC
PLATELET # BLD AUTO: 251 K/UL (ref 150–400)
PMV BLD AUTO: 9.2 FL (ref 8.9–12.9)
POTASSIUM SERPL-SCNC: 3.7 MMOL/L (ref 3.5–5.1)
PROMYELOCYTES NFR BLD MANUAL: 1 %
PROT SERPL-MCNC: 6.7 G/DL (ref 6.4–8.2)
RBC # BLD AUTO: 4.01 M/UL (ref 3.8–5.2)
RBC MORPH BLD: ABNORMAL
RBC MORPH BLD: ABNORMAL
SODIUM SERPL-SCNC: 138 MMOL/L (ref 136–145)
WBC # BLD AUTO: 5.9 K/UL (ref 3.6–11)
WBC MORPH BLD: ABNORMAL

## 2021-10-27 PROCEDURE — 77030012965 HC NDL HUBR BBMI -A

## 2021-10-27 PROCEDURE — 80053 COMPREHEN METABOLIC PANEL: CPT

## 2021-10-27 PROCEDURE — 36415 COLL VENOUS BLD VENIPUNCTURE: CPT

## 2021-10-27 PROCEDURE — 85025 COMPLETE CBC W/AUTO DIFF WBC: CPT

## 2021-10-27 PROCEDURE — 96360 HYDRATION IV INFUSION INIT: CPT

## 2021-10-27 PROCEDURE — 74011250636 HC RX REV CODE- 250/636: Performed by: NURSE PRACTITIONER

## 2021-10-27 RX ORDER — SODIUM CHLORIDE 9 MG/ML
10 INJECTION INTRAMUSCULAR; INTRAVENOUS; SUBCUTANEOUS AS NEEDED
Status: ACTIVE | OUTPATIENT
Start: 2021-10-27 | End: 2021-10-27

## 2021-10-27 RX ORDER — HEPARIN 100 UNIT/ML
300-500 SYRINGE INTRAVENOUS AS NEEDED
Status: ACTIVE | OUTPATIENT
Start: 2021-10-27 | End: 2021-10-27

## 2021-10-27 RX ORDER — SODIUM CHLORIDE 0.9 % (FLUSH) 0.9 %
10 SYRINGE (ML) INJECTION AS NEEDED
Status: DISPENSED | OUTPATIENT
Start: 2021-10-27 | End: 2021-10-27

## 2021-10-27 RX ADMIN — SODIUM CHLORIDE 10 ML: 9 INJECTION INTRAMUSCULAR; INTRAVENOUS; SUBCUTANEOUS at 08:09

## 2021-10-27 RX ADMIN — HEPARIN 500 UNITS: 100 SYRINGE at 09:25

## 2021-10-27 RX ADMIN — Medication 10 ML: at 08:09

## 2021-10-27 RX ADMIN — SODIUM CHLORIDE 1000 ML: 9 INJECTION, SOLUTION INTRAVENOUS at 08:10

## 2021-10-27 RX ADMIN — SODIUM CHLORIDE 10 ML: 9 INJECTION INTRAMUSCULAR; INTRAVENOUS; SUBCUTANEOUS at 09:24

## 2021-10-27 NOTE — PROGRESS NOTES
[facility]   Visit Progress Note    4847 Patient admitted to [facility] for hydration and labs in stable condition. Assessment completed. No new concerns voiced; continues to report dizziness/\"head swimming\". Pt denies need for antiemetic. VS  Patient Vitals for the past 12 hrs:   Temp Pulse Resp BP   10/27/21 0920  77  111/69   10/27/21 0801 97.7 °F (36.5 °C) 86 18 110/73         PAC with positive blood return. Medications:  Medications Administered     0.9% sodium chloride injection 10 mL     Admin Date  10/27/2021 Action  Given Dose  10 mL Route  IntraVENous Administered By  Mallie Jeans, RN           Admin Date  10/27/2021 Action  Given Dose  10 mL Route  IntraVENous Administered By  Mallie Jeans, RN          heparin (porcine) pf 300-500 Units     Admin Date  10/27/2021 Action  Given Dose  500 Units Route  InterCATHeter Administered By  Mallie Jeans, RN          sodium chloride (NS) flush 10 mL     Admin Date  10/27/2021 Action  Given Dose  10 mL Route  IntraVENous Administered By  Mallie Jeans, RN          sodium chloride 0.9 % bolus infusion 1,000 mL     Admin Date  10/27/2021 Action  New Bag Dose  1,000 mL Rate  1,000 mL/hr Route  IntraVENous Administered By  Mallie Jeans, [address] Patient tolerated treatment well. Patient discharged from [place] [address] in no distress.      Labs  Recent Results (from the past 12 hour(s))   CBC WITH AUTOMATED DIFF    Collection Time: 10/27/21  8:07 AM   Result Value Ref Range    WBC 5.9 3.6 - 11.0 K/uL    RBC 4.01 3.80 - 5.20 M/uL    HGB 12.0 11.5 - 16.0 g/dL    HCT 36.8 35.0 - 47.0 %    MCV 91.8 80.0 - 99.0 FL    MCH 29.9 26.0 - 34.0 PG    MCHC 32.6 30.0 - 36.5 g/dL    RDW 16.6 (H) 11.5 - 14.5 %    PLATELET 318 028 - 411 K/uL    MPV 9.2 8.9 - 12.9 FL    NRBC 0.7 (H) 0  WBC    ABSOLUTE NRBC 0.04 (H) 0.00 - 0.01 K/uL    NEUTROPHILS PENDING %    LYMPHOCYTES PENDING % MONOCYTES PENDING %    EOSINOPHILS PENDING %    BASOPHILS PENDING %    IMMATURE GRANULOCYTES PENDING %    ABS. NEUTROPHILS PENDING K/UL    ABS. LYMPHOCYTES PENDING K/UL    ABS. MONOCYTES PENDING K/UL    ABS. EOSINOPHILS PENDING K/UL    ABS. BASOPHILS PENDING K/UL    ABS. IMM. GRANS. PENDING K/UL    DF PENDING    METABOLIC PANEL, COMPREHENSIVE    Collection Time: 10/27/21  8:07 AM   Result Value Ref Range    Sodium 138 136 - 145 mmol/L    Potassium 3.7 3.5 - 5.1 mmol/L    Chloride 104 97 - 108 mmol/L    CO2 29 21 - 32 mmol/L    Anion gap 5 5 - 15 mmol/L    Glucose 81 65 - 100 mg/dL    BUN 13 6 - 20 MG/DL    Creatinine 0.68 0.55 - 1.02 MG/DL    BUN/Creatinine ratio 19 12 - 20      GFR est AA >60 >60 ml/min/1.73m2    GFR est non-AA >60 >60 ml/min/1.73m2    Calcium 9.4 8.5 - 10.1 MG/DL    Bilirubin, total 0.4 0.2 - 1.0 MG/DL    ALT (SGPT) 23 12 - 78 U/L    AST (SGOT) 11 (L) 15 - 37 U/L    Alk.  phosphatase 75 45 - 117 U/L    Protein, total 6.7 6.4 - 8.2 g/dL    Albumin 3.5 3.5 - 5.0 g/dL    Globulin 3.2 2.0 - 4.0 g/dL    A-G Ratio 1.1 1.1 - 2.2

## 2021-11-03 ENCOUNTER — TELEPHONE (OUTPATIENT)
Dept: ONCOLOGY | Age: 61
End: 2021-11-03

## 2021-11-03 NOTE — TELEPHONE ENCOUNTER
[address] at [place]  [phone]    11/03/2021--This user printed out patient's previous [employer] paperwork and answered number 7, as number 1 was already completed so there was nothing to answer. I faxed it over to  [employer] via the number provided on the forms and received a fax confirmation and will have scanned into patient's chart. This user attempted to call patient to let her know this information but she did not answer, so I left a message.

## 2021-11-03 NOTE — TELEPHONE ENCOUNTER
Patient called stated that she had FMLA paperwork sent to [facility] back in August and they contacted and informed her that question number 1 and 7 are incomplete and needs to be completed and sent back.  Please advise       CB# [phone]

## 2021-11-19 ENCOUNTER — DOCUMENTATION ONLY (OUTPATIENT)
Dept: SURGERY | Age: 61
End: 2021-11-19

## 2021-11-19 NOTE — PROGRESS NOTES
FAXED OFFICE NOTE AND PATHOLOGY REPORT TO [facility] PATIENT [employer] REPRESENTATIVE  [phone]. CONFIRMATION RECEIVED.

## 2021-11-22 ENCOUNTER — RESEARCH ENCOUNTER (OUTPATIENT)
Dept: ONCOLOGY | Age: 61
End: 2021-11-22

## 2021-11-22 NOTE — PROGRESS NOTES
Called patient to review W12 QOLs for  research study. Spoke with pt, pt said she will call back when she has the time.

## 2021-11-30 ENCOUNTER — NURSE NAVIGATOR (OUTPATIENT)
Dept: CASE MANAGEMENT | Age: 61
End: 2021-11-30

## 2021-11-30 NOTE — NURSE NAVIGATOR
[address]  Breast Navigator Encounter    Name: Selwyn Living  Age: 64 y.o.  : 1960  Diagnosis: Right breast IDC; ER+/CT+/HER2-;  Stage IIA; High risk mammaprint    Encounter type:  []Patient Initiated  [x]Navigator Follow-up []Pre-op  []Post-op  []Check-in Prior to First Treatment [x]Treatment Modality Change  []Survivorship Transition []Other:     Narrative:   Called to check in. Pt doing well, stating dizziness has resolved for the most part. She asked about taking hair,skin, and nails vitamins. I told her I don't think there would be an issue but to double check with Dr. Shelley Reyes while she is undergoing radiation treatment. She verbalized understanding. No other questions or concerns at this time. Encouraged her to reach out as needed. Will follow-up.      Interdisciplinary Team:  Med-Onc: Dr. Chuyita Esposito MD  Surg-Onc: Dr. Lela Colbert  Rad-Onc: Dr. Columba Coleman MD  Plastics:  : Siena Renae LCSW  Nurse Navigator:  LUKE Vicekrs, RN, LUKE Fuller, RN, [facility]  Oncology Breast Navigator    [address]  [address] [place] [address]  W: [phone]  F: [phone]  [email or web address]   Good Help to Those in [facility]

## 2021-12-03 ENCOUNTER — RESEARCH ENCOUNTER (OUTPATIENT)
Dept: ONCOLOGY | Age: 61
End: 2021-12-03

## 2021-12-03 NOTE — PROGRESS NOTES
Pt called for follow up visit today per protocol with RN. This is her week 12 visit. QOLs were completed per protocol. Assessments were not completed due to visit being a phone call. Next appt is scheduled for 1/26/22.

## 2022-01-14 ENCOUNTER — OFFICE VISIT (OUTPATIENT)
Dept: SURGERY | Age: 62
End: 2022-01-14
Payer: COMMERCIAL

## 2022-01-14 ENCOUNTER — TELEPHONE (OUTPATIENT)
Dept: SURGERY | Age: 62
End: 2022-01-14

## 2022-01-14 VITALS
HEIGHT: 60 IN | WEIGHT: 187 LBS | BODY MASS INDEX: 36.71 KG/M2 | SYSTOLIC BLOOD PRESSURE: 117 MMHG | HEART RATE: 82 BPM | DIASTOLIC BLOOD PRESSURE: 70 MMHG

## 2022-01-14 DIAGNOSIS — Z92.3 S/P RADIATION THERAPY: ICD-10-CM

## 2022-01-14 DIAGNOSIS — C50.912 BILATERAL MALIGNANT NEOPLASM OF BREAST IN FEMALE, ESTROGEN RECEPTOR POSITIVE, UNSPECIFIED SITE OF BREAST (HCC): Primary | ICD-10-CM

## 2022-01-14 DIAGNOSIS — C50.911 BILATERAL MALIGNANT NEOPLASM OF BREAST IN FEMALE, ESTROGEN RECEPTOR POSITIVE, UNSPECIFIED SITE OF BREAST (HCC): Primary | ICD-10-CM

## 2022-01-14 DIAGNOSIS — Z17.0 BILATERAL MALIGNANT NEOPLASM OF BREAST IN FEMALE, ESTROGEN RECEPTOR POSITIVE, UNSPECIFIED SITE OF BREAST (HCC): Primary | ICD-10-CM

## 2022-01-14 DIAGNOSIS — Z85.3 HISTORY OF BREAST CANCER IN FEMALE: ICD-10-CM

## 2022-01-14 DIAGNOSIS — Z98.890 S/P LUMPECTOMY OF BREAST: ICD-10-CM

## 2022-01-14 PROCEDURE — 99213 OFFICE O/P EST LOW 20 MIN: CPT | Performed by: NURSE PRACTITIONER

## 2022-01-14 NOTE — PATIENT INSTRUCTIONS

## 2022-01-14 NOTE — PROGRESS NOTES
HISTORY OF PRESENT ILLNESS  Meena Quesada is a 64 y.o. female. HPI Established patient presents for follow-up for BILATERAL breast cancer. Denies breast mass, worrisome skin changes, nipple discharge and pain. Breast history -   Referring - Dr. Clark Obregon  · 5/24/21 right breast mass core bx:  IDC, 7 mm, gr 1-2, no LVI, ER + at 100%, NV + at 95%, HER 2 negative at [facility] 1+, ki67 < 5%  · MRI bx, 6/16/21 breast left site A, posterior bx: ILC, gr 1, 6 mm, ER + at 99%, NV + at 60%, HER 2 negative at [facility] 1+, ki67 19%; left breast site B, anterior bx:  ILC, gr 1, 1.1 cm, ER + at 99%, NV negative, HER 2 negative (IHC 2+; FISH ratio 1.1; sig/cell 2); ki67 18%  · 6/29/21 - BILATERAL lumpectomies and SLNBs - Dr. Denise Mederos  · 4/18/09 - RIGHT breast lumpectomy:  IDC, 2.5 cm, gr 1, DCIS, gr 2, focal 2 mm, 0/7 LN, pT2 pN0 cM0  · 6/29/21 - LEFT breast lumpectomy:  Multifocal ILC, gr 1, 1.8 cm and 1.5 cm, 0/8 LN, pT1c pN0 cM0  · Mammaprint, right breast, high-risk, luminal type B  · Mammaprint, left breast, site B, low-risk, luminal type A  · TC 8/17/21- 10/19/21- Dr. Jazzy Turner  · 11/2021 - completed XRT - Dr. Sekou San  · 1/2022 - started anastrozole - Dr. Jazzy Turner      Family history -   Father with lung cancer  Paternal aunt with ovarian cancer  No breast, prostate or pancreatic cancer  9/4/21 - [facility] genetic testing - negative      OB History    No obstetric history on file. Obstetric Comments   Menarche:  5. LMP: 2014. # of Children:  4. Age at Delivery of First Child:  13.   Hysterectomy/oophorectomy:  NO/?.  Breast Bx:  no.  Hx of Breast Feeding:  ? [place] BCP:  ? . Hormone therapy:  no.                    Past Surgical History:   Procedure Laterality Date    HX BREAST LUMPECTOMY Bilateral 6/29/2021    LEFT BREAST LUMPECTOMY WITH BRACKETED NEEDLE LOCALIZATION, LEFT BREAST SENTINEL NODE BIOPSY, RIGHT BREAST LUMPECTOMY WITH ULTRASOUND, RIGHT BREAST SENTINEL NODE BIOPSY performed by Donna Rodriguez MD at [address] [address] OR    HX ORTHOPAEDIC Left 2016    parital left knee arthroplasty    LA ABDOMEN SURGERY PROC UNLISTED  2006    lap nissen         ROS    Physical Exam  Constitutional:       Appearance: Normal appearance. Chest:   Breasts:      Right: Skin change present. No mass, nipple discharge, tenderness, axillary adenopathy or supraclavicular adenopathy. Left: Skin change present. No mass, nipple discharge, tenderness, axillary adenopathy or supraclavicular adenopathy. Comments: BILATERAL breast and axillary incisions - well healed; skin peeling post XRT, but no blisters or skin breakdown  Musculoskeletal:      Comments: FROM - UE x 2   Lymphadenopathy:      Upper Body:      Right upper body: No supraclavicular or axillary adenopathy. Left upper body: No supraclavicular or axillary adenopathy. Neurological:      Mental Status: She is alert. Psychiatric:         Attention and Perception: Attention normal.         Mood and Affect: Mood normal.         Speech: Speech normal.         Behavior: Behavior normal.         Visit Vitals  /70 (BP 1 Location: Left upper arm, BP Patient Position: Sitting, BP Cuff Size: Adult)   Pulse 82   Ht 5' (1.524 m)   Wt 187 lb (84.8 kg)   BMI 36.52 kg/m²         ASSESSMENT and PLAN    ICD-10-CM ICD-9-CM    1. Bilateral malignant neoplasm of breast in female, estrogen receptor positive, unspecified site of breast (HCC)  C50.911 174.9     Z17.0 V86.0     C50.912     2. S/P lumpectomy of breast  Z98.890 V45.89    3. S/P radiation therapy  Z92.3 V66.1    4. History of breast cancer in female  Z80.3 V10.3 JORDAN 3D LULY W MAMMO BI DX INCL CAD      Normal exam with no evidence of local recurrence. Reviewed normal changes to the breast post treatment. Breasts remain sensitive. Taking AI and tolerating well. Port - would like removed. Request made to scheduling. BDmammogram 3D in 5/2022. RTC in 6 months or sooner PRN. She is comfortable with this plan.   All questions answered and she stated understanding. Total time spent for this patient - 20 minutes.

## 2022-01-19 DIAGNOSIS — C50.912 MALIGNANT NEOPLASM OF LEFT FEMALE BREAST, UNSPECIFIED ESTROGEN RECEPTOR STATUS, UNSPECIFIED SITE OF BREAST (HCC): Primary | ICD-10-CM

## 2022-01-19 DIAGNOSIS — C50.911 MALIGNANT NEOPLASM OF RIGHT FEMALE BREAST, UNSPECIFIED ESTROGEN RECEPTOR STATUS, UNSPECIFIED SITE OF BREAST (HCC): ICD-10-CM

## 2022-01-26 ENCOUNTER — OFFICE VISIT (OUTPATIENT)
Dept: ONCOLOGY | Age: 62
End: 2022-01-26
Payer: COMMERCIAL

## 2022-01-26 ENCOUNTER — RESEARCH ENCOUNTER (OUTPATIENT)
Dept: ONCOLOGY | Age: 62
End: 2022-01-26

## 2022-01-26 VITALS
SYSTOLIC BLOOD PRESSURE: 115 MMHG | HEIGHT: 60 IN | TEMPERATURE: 98.6 F | WEIGHT: 183.8 LBS | OXYGEN SATURATION: 94 % | RESPIRATION RATE: 18 BRPM | DIASTOLIC BLOOD PRESSURE: 70 MMHG | BODY MASS INDEX: 36.08 KG/M2 | HEART RATE: 83 BPM

## 2022-01-26 DIAGNOSIS — C50.911 BILATERAL MALIGNANT NEOPLASM OF BREAST IN FEMALE, ESTROGEN RECEPTOR POSITIVE, UNSPECIFIED SITE OF BREAST (HCC): Primary | ICD-10-CM

## 2022-01-26 DIAGNOSIS — C50.912 BILATERAL MALIGNANT NEOPLASM OF BREAST IN FEMALE, ESTROGEN RECEPTOR POSITIVE, UNSPECIFIED SITE OF BREAST (HCC): Primary | ICD-10-CM

## 2022-01-26 DIAGNOSIS — Z17.0 BILATERAL MALIGNANT NEOPLASM OF BREAST IN FEMALE, ESTROGEN RECEPTOR POSITIVE, UNSPECIFIED SITE OF BREAST (HCC): Primary | ICD-10-CM

## 2022-01-26 PROCEDURE — 99214 OFFICE O/P EST MOD 30 MIN: CPT | Performed by: INTERNAL MEDICINE

## 2022-01-26 NOTE — PROGRESS NOTES
Pt in for follow up visit today per  protocol with Dr. Shanelle Vilchis and RN. This is the 24 week time point of treatment. Patient refused research labs at this appointment. Assessment and QOLs completed.

## 2022-01-26 NOTE — PROGRESS NOTES
[facility] is a 64 y.o. female Follow up for the evaluation of breast cancer. 1. Have you been to the ER, urgent care clinic since your last visit? Hospitalized since your last visit? No    2. Have you seen or consulted any other health care providers outside of the [address] [place] [address] since your last visit? Include any pap smears or colon screening.  No

## 2022-01-26 NOTE — PATIENT INSTRUCTIONS
Start the following for your joint pain:    Tart cherry juice concentrate - 1 Tbsp in 8oz water daily  Omega 3 fatty acids 3g/day    Zoledronic Acid (By injection)   Zoledronic Acid ([id number] AS-id)  Treats high blood calcium levels. Also treats bone damage caused by Paget disease, multiple myeloma, and cancers that spread to the bone. Also treats osteoporosis and reduces the risk of hip fractures in certain patients. Brand Name(s): PremierPro Rx Zoledronic Acid, Reclast, Zoledronic Acid Novaplus, Zometa   There may be other brand names for this medicine. When This Medicine Should Not Be Used: This medicine is not right for everyone. You should not receive it if you had an allergic reaction to zoledronic acid, or if you are pregnant. How to Use This Medicine:   Injectable  · A nurse or other health provider will give you this medicine. · Your doctor will prescribe your dose and schedule. This medicine is given through a needle placed in a vein. · Your doctor may tell you to drink extra liquids before your treatment to prevent kidney problems. · Your doctor may also give you vitamin D and calcium supplements. Tell your doctor if you are not able to take these medicines. · This medicine should come with a Medication Guide. Ask your pharmacist for a copy if you do not have one. · Missed dose: You must use this medicine on a fixed schedule. Call your doctor or pharmacist if you miss a dose. Drugs and Foods to Avoid:   Ask your doctor or pharmacist before using any other medicine, including over-the-counter medicines, vitamins, and herbal products. · Do not use other medicines that also contain zoledronic acid. Do not use zoledronic acid together with another bisphosphonate medicine. · Some foods and medicines can affect how zoledronic acid works.  Tell your doctor if you are using digoxin, antibiotics, diuretics (water pills), an NSAID pain or arthritis medicine (such as aspirin, celecoxib, ibuprofen, naproxen), steroid medicines, or cancer medicines. Warnings While Using This Medicine:   · It is not safe to take this medicine during pregnancy. It could harm an unborn baby. Tell your doctor right away if you become pregnant. · Tell your doctor if you are breastfeeding, or if you have kidney disease, anemia, aspirin-sensitive asthma, bleeding problems, cancer, congestive heart failure, low blood calcium levels, stomach absorption problems, mineral imbalance, dental problems or gum disease. Also tell your doctor if you had surgery on your bowel or parathyroid or thyroid gland. · This medicine may cause the following problems:  ¨ Jaw or teeth problems  ¨ Severe bone, joint, or muscle pain  ¨ Increased risk of thigh bone fracture  ¨ Low calcium levels in your blood  · You must have regular dental exams while you are being treated with this medicine. Tell your dentist or oral surgeon that you are using this medicine. · Your doctor will do lab tests at regular visits to check on the effects of this medicine. Keep all appointments.   Possible Side Effects While Using This Medicine:   Call your doctor right away if you notice any of these side effects:  · Allergic reaction: Itching or hives, swelling in your face or hands, swelling or tingling in your mouth or throat, chest tightness, trouble breathing  · Chest pain, trouble breathing, fast or uneven heartbeat  · Decrease in how much or how often you urinate, blood in the urine, lower back or side pain, burning or painful urination  · Muscle spasm or twitching, or numbness or tingling in your fingers, feet, or around your mouth  · Pain, swelling, or numbness in the mouth or jaw, loose teeth or other teeth problems  · Severe muscle, bone, or joint pain  · Unusual pain in your thigh, groin, or hip  If you notice these less serious side effects, talk with your doctor:   · Fever, chills, cough, sore throat, and body aches  · Headache  · Mild nausea, constipation, diarrhea, stomach pain or upset  · Redness, pain, or swelling of your skin where the needle is placed  If you notice other side effects that you think are caused by this medicine, tell your doctor. Call your doctor for medical advice about side effects. You may report side effects to FDA at 6-411-TMC-0295  © 2017 [facility] Information is for End User's use only and may not be sold, redistributed or otherwise used for commercial purposes. The above information is an  only. It is not intended as medical advice for individual conditions or treatments. Talk to your doctor, nurse or pharmacist before following any medical regimen to see if it is safe and effective for you.

## 2022-01-26 NOTE — PROGRESS NOTES
[facility] at [address], [address] [facility] 100[facility]  W: [phone]  F: [phone]      Reason for Visit:   Vladimir Mo is a 64 y.o. female who is seen in follow up for breast cancer    Rad onc:  Dr. Ariana Martinez  Breast Surgeon: Dr. Marcello Fox    Treatment History:   · 5/24/21 right breast mass core bx:  IDC, 7 mm, gr 1-2, no LVI, ER + at 100%, NH + at 95%, HER 2 negative at [facility] 1+, ki67 < 5%  · MRI bx, 6/16/21 breast left site A, posterior bx: ILC, gr 1, 6 mm, ER + at 99%, NH + at 60%, HER 2 negative at [facility] 1+, ki67 19%; left breast site B, anterior bx:  ILC, gr 1, 1.1 cm, ER + at 99%, NH negative, HER 2 negative ([facility] 2+; FISH ratio 1.1 ; sig/cell 2); ki67 18%  · 6/29/21 right breast lumpectomy:  IDC, 2.5 cm, gr 1, DCIS, gr 2, focal 2 mm, 0/7 LN, pT2 pN0 cM0; left breast lumpectomy:  Multifocal ILC, gr 1, 1.8 cm and 1.5 cm, 0/8 LN, pT1c pN0 cM0  · Mammaprint, right breast, high-risk, luminal type B  · Mammaprint, left breast, site B, low-risk, luminal type A  · 9/4/21 invitae genetic testing, negative  · TC 8/17/21- 10/19/21  · XRT completed 11/30/21  · Anastrozole 12/7/22 -     History of Present Illness: An abnormal right mammogram led to the pathology above    Interval history: Patient presents today for follow up and treatment. She reports gr 1 fatigue, gr 1 hair loss, gr 1 nausea, gr 2 generalized pain/carmping she rates 7/10, gr 1 cough, gr 1 dyspnea, gr 1 rapid heartbeat.      FH:  Father with lung cancer; no breast; paternal aunt with ovarian cancer, no prostate or pancreas cancer    Past Medical History:   Diagnosis Date    Asthma     Cancer ([facility] [address] 06/2021    bilateral breast cancer    COVID-19 02/2021    GERD (gastroesophageal reflux disease)     Hyperlipemia     Hypertension       Past Surgical History:   Procedure Laterality Date    HX BREAST LUMPECTOMY Bilateral 6/29/2021    LEFT BREAST LUMPECTOMY WITH BRACKETED NEEDLE LOCALIZATION, LEFT BREAST SENTINEL NODE BIOPSY, RIGHT BREAST LUMPECTOMY WITH ULTRASOUND, RIGHT BREAST SENTINEL NODE BIOPSY performed by Claudine Alonzo MD at [place] [address] HX ORTHOPAEDIC Left 2016    parital left knee arthroplasty    [place] ABDOMEN SURGERY PROC UNLISTED  2006    lap nissen      Social History     Tobacco Use    Smoking status: Never Smoker    Smokeless tobacco: Never Used   Substance Use Topics    Alcohol use: Not Currently     Comment:  2 drinks per year      Family History   Problem Relation Age of Onset    Lung Cancer Father      Current Outpatient Medications   Medication Sig    bioflav,lemon/vit Bcomp,C (LIPO-FLAVONOID PLUS PO) Take  by mouth.  OTHER,NON-FORMULARY, FLAVANOID    OTHER 6,000 mcg. HAIR, SKIN AND NAIL GUMMIES.  anastrozole (ARIMIDEX) 1 mg tablet Take 1 mg by mouth daily. To start 1 week after completion of radiation    prochlorperazine (COMPAZINE) 10 mg tablet Take 1 Tablet by mouth every six (6) hours as needed for Nausea.  lidocaine-prilocaine (EMLA) topical cream Apply  to affected area as needed for Pain.  ondansetron hcl (ZOFRAN) 8 mg tablet Take 1 Tablet by mouth every eight (8) hours as needed for Nausea.  dexAMETHasone (DECADRON) 4 mg tablet 8 mg PO bid the day before and day after chemo    rosuvastatin (CRESTOR) 20 mg tablet Take 20 mg by mouth daily.  cholecalciferol, vitamin D3, (Vitamin D3) 50 mcg (2,000 unit) tab Take 1 Tablet by mouth daily.  omeprazole (PRILOSEC) 20 mg capsule Take 20 mg by mouth daily.  celecoxib (CELEBREX) 200 mg capsule TAKE 1 CAPSULE BY MOUTH ONCE DAILY WITH FOOD FOR 30 DAYS    lisinopril-hydroCHLOROthiazide (PRINZIDE, ZESTORETIC) 20-25 mg per tablet Take 1 Tablet by mouth daily.  traMADoL (ULTRAM) 50 mg tablet Take 50 mg by mouth every six (6) hours as needed for Pain. No current facility-administered medications for this visit.       No Known Allergies     Review of Systems: A complete review of systems was obtained, negative except as described above. Physical Exam:     Visit Vitals  /70   Pulse 83   Temp 98.6 °F (37 °C) (Temporal)   Resp 18   Ht 5' (1.524 m)   Wt 183 lb 12.8 oz (83.4 kg)   SpO2 94%   BMI 35.90 kg/m²     ECOG PS: 0    Constitutional: Appears well-developed and well-nourished in no apparent distress      Mental status: Alert and awake, Oriented to person/place/time, Able to follow commands    Eyes: EOM normal, Sclera normal, No visible ocular discharge    HENT: Normocephalic, atraumatic    Mouth/Throat: Moist mucous membranes    External Ears normal    Neck: No visualized mass    Pulmonary/Chest: Respiratory effort normal, No visualized signs of difficulty breathing or respiratory distress    Musculoskeletal: Normal gait with no signs of ataxia, Normal range of motion of neck    Neurological: No facial asymmetry (Cranial nerve 7 motor function), No gaze palsy    Skin: No significant exanthematous lesions or discoloration noted on facial skin    Psychiatric: Normal affect, No hallucinations       Results:     Lab Results   Component Value Date/Time    WBC 5.9 10/27/2021 08:07 AM    HGB 12.0 10/27/2021 08:07 AM    HCT 36.8 10/27/2021 08:07 AM    PLATELET 403 77/16/8619 08:07 AM    MCV 91.8 10/27/2021 08:07 AM    ABS. NEUTROPHILS 2.8 10/27/2021 08:07 AM     Lab Results   Component Value Date/Time    Sodium 138 10/27/2021 08:07 AM    Potassium 3.7 10/27/2021 08:07 AM    Chloride 104 10/27/2021 08:07 AM    CO2 29 10/27/2021 08:07 AM    Glucose 81 10/27/2021 08:07 AM    BUN 13 10/27/2021 08:07 AM    Creatinine 0.68 10/27/2021 08:07 AM    GFR est AA >60 10/27/2021 08:07 AM    GFR est non-AA >60 10/27/2021 08:07 AM    Calcium 9.4 10/27/2021 08:07 AM     Lab Results   Component Value Date/Time    Bilirubin, total 0.4 10/27/2021 08:07 AM    ALT (SGPT) 23 10/27/2021 08:07 AM    Alk.  phosphatase 75 10/27/2021 08:07 AM    Protein, total 6.7 10/27/2021 08:07 AM    Albumin 3.5 10/27/2021 08:07 AM    Globulin 3.2 10/27/2021 08:07 AM     6/8/21 MRI breast  FINDINGS:      Background enhancement: Minimal.     Right Breast: At about the 8:00 position and middle depth of the right breast  there is a 5 x 6 x 8 mm enhancing nodule adjacent to the posterior aspect of  HydroMark clip signal void. There are no other enhancing lesions to suggest any  additional sites of invasive breast carcinoma.     Left Breast: There is a 7 x 9 x 11 mm mass with washout kinetics in the 12:00  position of the central left breast near the junction of the posterior middle  thirds. Slightly superior to this at 12:00 at approximately the junction of the  anterior middle thirds there is a 6 x 9 x 9 mm enhancing focus with some minimal  washout kinetics. There are no other spacious enhancing lesions identified.     Lymph: Unremarkable.     Chest wall and visualized abdomen: Unremarkable.     IMPRESSION  Known right breast carcinoma with no additional right breast lesions  identified. There are two suspicious enhancing masses left breast.     Right Breast:  BI-RADS Assessment Category 6: Known biopsy proven malignancy-  Appropriate action should be taken.     Left Breast:   BI-RADS Assessment Category 4: Suspicious abnormality - Biopsy  should be performed in the absence of clinical contraindication.     RECOMMENDATION: MRI guided left breast biopsy of two lesions. Records reviewed and summarized above. Pathology report(s) reviewed above. Radiology report(s) reviewed above. Assessment/plan:   1. Right breast LOQ IDC, 2.5 cm, 0/7 LN, gr 2, ER +, TN +, HER 2 negative, stage IIA, prognostic stage IA  High risk mammaprint    We explained to the patient that the goal of systemic adjuvant therapy is to improve the chances for cure and decrease the risk of relapse. We explained why a patient can have microscopic cancer spread now even though physical examination, laboratory studies and imaging studies are negative for cancer.  We explained that the same treatments used now as adjuvant or preventive treatments rarely if ever are curative in women who develop metastases. She is agreeable to anastrozole. Started this 12/7/22. Tolerating well with moderate joint pain, see #9. She will call us in 2 weeks and will consider switching to exemestane. Port may be removed after 3 weeks. She gets this removed 2/1/22. 2. Left breast central ILC, gr 1, multifocal, 1.8 cm and 1.5 cm, ER +, ID +, HER 2 negative and ER +, ID negative, HER 2 negative, 0/8 LN involved, stage IA both anatomic and prognostic  Low risk mammaprint    See treatment discussion above for higher risk disease    3. Emotional well being:  She has excellent support and is coping well with her disease    4. Genetic testing:   discussed potential ramifications of a positive test including the potential need for bilateral mastectomies and bilateral oophorectomies and the risk then for her family members to also have a mutation. VUS also discussed. invitae testing negative    5. Fatigue/Dizziness: fatigue continues but improved since last visit. 6. Rapid Heart Rate: patient reports flutter to chest on occasion she feels is due to anxiety. Denies concurrent chest pain or dyspnea. EKG not performed. This continues and she will follow up with PCP. 7. Osteopenia: per DEXA on 4/23/21. She is taking 2000 international units of vitamin D3 and 2-3 servings of dietary calcium. We discussed the data from [id number] [place] [address], [facility] 2013, for the meta-analysis for adjuvant bisphosphonate use in breast cancer. We discussed that in postmenopausal women, it appears that the bisphosphate zolendronic acid, given as in ABCSG 12 at 4 mg IV q 6 months x 3 years, is beneficial in improving bone DFS and OS. We discussed side effects such as ONJ and the need to avoid invasive dental procedures while on these medications, renal damage, and hypocalcemia.     She is agreeable to this but has not seen a dentist in about one year. She will see a dentist then reach out to us    8. Neuropathy: resolved. 9. Joint Pain: most likely due to AI and bothersome. She will try tart cherry juice and omgea 3 FA 3g/day. If no improvement she will call us and discuss HOLDING anastrozole x2 weeks and switching to exemestane. I personally saw and evaluated the patient and performed the entire medical decision making. The history, physical exam, and documentation were performed by Argelia Niño NP. I reviewed and verified the above documentation and modified it as needed. Bilateral breast cancer, high risk on right, low risk on left, on anastrozole, LOGAN, continue. No neuropathy. Having joint pains due to AI, discussed tart cherry juice and omega 3 FA, which she will try, but also discussed stopping anastrozole and changing to another medication. She would like to continue anastrozole for now. Osteopenia on dexa, will monitor for now as pt needs to see dentist.  Dawna Ferreira 3 months, she will call sooner if pain does not improve    I appreciate the opportunity to participate in Ms. Guadalupe Zarate's care. Signed By: En Alvarado MD      No orders of the defined types were placed in this encounter.

## 2022-01-28 ENCOUNTER — HOSPITAL ENCOUNTER (OUTPATIENT)
Dept: PREADMISSION TESTING | Age: 62
Discharge: HOME | End: 2022-01-28
Payer: COMMERCIAL

## 2022-01-28 PROCEDURE — U0005 INFEC AGEN DETEC AMPLI PROBE: HCPCS

## 2022-01-29 LAB
SARS-COV-2, XPLCVT: NOT DETECTED
SOURCE, COVRS: NORMAL

## 2022-02-01 ENCOUNTER — HOSPITAL ENCOUNTER (OUTPATIENT)
Age: 62
Setting detail: OUTPATIENT SURGERY
Discharge: HOME | End: 2022-02-01
Attending: SURGERY | Admitting: SURGERY
Payer: COMMERCIAL

## 2022-02-01 VITALS
DIASTOLIC BLOOD PRESSURE: 73 MMHG | RESPIRATION RATE: 21 BRPM | HEART RATE: 84 BPM | OXYGEN SATURATION: 99 % | SYSTOLIC BLOOD PRESSURE: 119 MMHG | TEMPERATURE: 98.1 F

## 2022-02-01 DIAGNOSIS — C50.911 MALIGNANT NEOPLASM OF RIGHT FEMALE BREAST, UNSPECIFIED ESTROGEN RECEPTOR STATUS, UNSPECIFIED SITE OF BREAST (HCC): ICD-10-CM

## 2022-02-01 DIAGNOSIS — C50.912 MALIGNANT NEOPLASM OF LEFT FEMALE BREAST, UNSPECIFIED ESTROGEN RECEPTOR STATUS, UNSPECIFIED SITE OF BREAST (HCC): ICD-10-CM

## 2022-02-01 PROCEDURE — 76030000002 HC AMB SURG OR TIME FIRST 0.: Performed by: SURGERY

## 2022-02-01 PROCEDURE — 36590 REMOVAL TUNNELED CV CATH: CPT | Performed by: SURGERY

## 2022-02-01 PROCEDURE — 74011000250 HC RX REV CODE- 250: Performed by: SURGERY

## 2022-02-01 PROCEDURE — 2709999900 HC NON-CHARGEABLE SUPPLY: Performed by: SURGERY

## 2022-02-01 PROCEDURE — 77030011267 HC ELECTRD BLD COVD -A: Performed by: SURGERY

## 2022-02-01 PROCEDURE — 76210000046 HC AMBSU PH II REC FIRST 0.5 HR: Performed by: SURGERY

## 2022-02-01 RX ORDER — BUPIVACAINE HYDROCHLORIDE AND EPINEPHRINE 5; 5 MG/ML; UG/ML
30 INJECTION, SOLUTION EPIDURAL; INTRACAUDAL; PERINEURAL ONCE
Status: CANCELLED | OUTPATIENT
Start: 2022-02-01 | End: 2022-02-01

## 2022-02-01 RX ORDER — LIDOCAINE HYDROCHLORIDE AND EPINEPHRINE 10; 10 MG/ML; UG/ML
30 INJECTION, SOLUTION INFILTRATION; PERINEURAL ONCE
Status: CANCELLED | OUTPATIENT
Start: 2022-02-01 | End: 2022-02-01

## 2022-02-01 NOTE — BRIEF OP NOTE
Brief Postoperative Note    Patient: Coby Romano  YOB: 1960  MRN: [id number]    Date of Procedure: 2/1/2022     Pre-Op Diagnosis: BILATERAL BREAST CANCER    Post-Op Diagnosis: Same as preoperative diagnosis. Procedure(s):  PORT REMOVAL    Surgeon(s):  Sherry Cooper MD    Surgical Assistant: None    Anesthesia: Local     Estimated Blood Loss (mL): Minimal    Complications: None    Specimens: * No specimens in log *     Implants:   Implant Name Type Inv.  Item Serial No.  Lot No. LRB No. Used Action   PORT ATTACH CATH POWERPORT 8FR --  - SN/A  PORT ATTACH CATH POWERPORT 8FR --  N/A [employer]WD [id number] N/A 1 Explanted       Drains: * No LDAs found *    Findings: Intact portacath    Electronically Signed by Gayathri Bunn MD on 4/3/1945 at 12:10 PM

## 2022-02-01 NOTE — H&P
HISTORY OF PRESENT ILLNESS  Lou Ghotra is a 61 y.o. female. HPI  ESTABLISHED patient here for post op visit s/p bilateral breast lumpectomies and bilateral SLNB. She is having some pain in the LEFT breast and feels \"pulling\" in RIGHT breast.     05/24/21: RIGHT breast bx, 9:00. PATH: IDC, 0.7cm, histologic grade 1-2, ER+(100%)/MI+(95%)/HER2-, Ki-67 <5%. Clinical stage 1.     06/16/21: LEFT breast MRI-guided biopsies. PATH:  · Site A, posterior: Invasive and in situ mammary carcinoma, favor lobular, 6mm, overall grade 1, ER+(99%)/MI+(60%)/HER2-, Ki-67 19%. · Site B, anterior: Invasive and in situ mammary carcinoma, favor lobular, 11mm, overall grade 1, ER+(99%)/MI-, HER2- by FISH, Ki-67 18%. MammaPrint: Low risk, luminal type A, +0.309. · Clinical stage 1.     06/29/21: BL lumpectomies and BL SLNBx. PATH:  · RIGHT: IDC, 25 x 10 x 10, overall grade 1, negative margins, 0/7 LNs invovled. DCIS, 2 x 1mm, negative margins. Fibroadenoma. Pathologic stage: pT2, pN0. MammaPrint pending. · LEFT: Multifocal ILC (x2), 18 x 15 x 10mm and 15 x 14 x 10, overall grade 1, negative margins, 0/8 LNs involved. LCIS.  Pathologic stage: pT1c, pN0.     MammaPrint pending for RIGHT breast.             Past Medical History:   Diagnosis Date    Asthma      Cancer ([address] 06/2021     bilateral breast cancer    COVID-19 02/2021    GERD (gastroesophageal reflux disease)      Hyperlipemia      Hypertension                 Past Surgical History:   Procedure Laterality Date    HX BREAST LUMPECTOMY Bilateral 6/29/2021     LEFT BREAST LUMPECTOMY WITH BRACKETED NEEDLE LOCALIZATION, LEFT BREAST SENTINEL NODE BIOPSY, RIGHT BREAST LUMPECTOMY WITH ULTRASOUND, RIGHT BREAST SENTINEL NODE BIOPSY performed by Gerry Don MD at [place] [address] HX ORTHOPAEDIC Left 2016     parital left knee arthroplasty    [place] ABDOMEN SURGERY PROC UNLISTED   2006     lap nissen         Social History            Socioeconomic History    Marital status:        Spouse name: Not on file    Number of children: Not on file    Years of education: Not on file    Highest education level: Not on file   Occupational History    Not on file   Tobacco Use    Smoking status: Never Smoker    Smokeless tobacco: Never Used   Vaping Use    Vaping Use: Never used   Substance and Sexual Activity    Alcohol use: Not Currently       Comment:  2 drinks per year    Drug use: No    Sexual activity: Not on file   Other Topics Concern    Not on file   Social History Narrative    Not on file      Social Determinants of Health          Financial Resource Strain:     Difficulty of Paying Living Expenses:    Food Insecurity:     Worried About Running Out of Food in the Last Year:     [address] in the Last Year:    Transportation Needs:     Lack of Transportation (Medical):      Lack of Transportation (Non-Medical):    Physical Activity:     Days of Exercise per Week:     Minutes of Exercise per Session:    Stress:     Feeling of Stress :    Social Connections:     Frequency of Communication with Friends and Family:     Frequency of Social Gatherings with Friends and Family:     Attends Jew Services:     Active Member of Clubs or Organizations:     Attends Club or Organization Meetings:     Marital Status:    Intimate Partner Violence:     Fear of Current or Ex-Partner:     Emotionally Abused:     Physically Abused:     Sexually Abused:                 Current Outpatient Medications on File Prior to Visit   Medication Sig Dispense Refill    rosuvastatin (CRESTOR) 20 mg tablet Take 20 mg by mouth daily.        cholecalciferol, vitamin D3, (Vitamin D3) 50 mcg (2,000 unit) tab Take 1 Tablet by mouth daily.        omeprazole (PRILOSEC) 20 mg capsule Take 20 mg by mouth daily.        celecoxib (CELEBREX) 200 mg capsule TAKE 1 CAPSULE BY MOUTH ONCE DAILY WITH FOOD FOR 30 DAYS        lisinopril-hydroCHLOROthiazide (Gini Brasher, ZESTORETIC) 20-25 mg per tablet Take 1 Tablet by mouth daily.        traMADoL (ULTRAM) 50 mg tablet Take 50 mg by mouth every six (6) hours as needed for Pain.          No current facility-administered medications on file prior to visit.         No Known Allergies     OB History    No obstetric history on file.       Obstetric Comments   Menarche:  9. LMP: 2014. # of Children:  4. Age at Delivery of First Child:  13.   Hysterectomy/oophorectomy:  NO/?.  Breast Bx:  no.  Hx of Breast Feeding:  ? Angelica Ferguson BCP:  ? . Hormone therapy:  no.                       ROS     Physical Exam  Exam conducted with a chaperone present. Cardiovascular:      Rate and Rhythm: Normal rate and regular rhythm. Heart sounds: Normal heart sounds. Pulmonary:      Breath sounds: Normal breath sounds. Chest:      Breasts: Breasts are symmetrical.         Right: Normal. No swelling, bleeding, inverted nipple, mass, nipple discharge, skin change or tenderness. Left: Normal. No swelling, bleeding, inverted nipple, mass, nipple discharge, skin change or tenderness. Lymphadenopathy:      Cervical:      Right cervical: No superficial, deep or posterior cervical adenopathy. Left cervical: No superficial, deep or posterior cervical adenopathy. Upper Body:      Right upper body: No supraclavicular or axillary adenopathy. Left upper body: No supraclavicular or axillary adenopathy.       ASSESSMENT and PLAN      ICD-10-CM ICD-9-CM     1. Bilateral malignant neoplasm of breast in female, estrogen receptor positive, unspecified site of breast ([facility]  C50.911 174. 9       Z17.0 V86.0       C50.912          Patient presents for f/u s/p BL lumpectomies and BL SLNBx on 06/29/21, and is doing well overall. Well healed incisions bilaterally, no evidence of local recurrence. Reviewed low risk MammaPrint results on the LEFT.  MammaPrint still pending on the RIGHT, will call with results when available     Pt may begin to resume her normal activities and swimming. She may return to work in 3 weeks and may do heavy lifting there as usual. Also addressed questions about post-op immunity; pt may have changes in immunity with chemo, but is at normal immunity now after surgery.     Pt to see medical oncology and radiation oncology for further consultation for adjuvant treatments. F/U in 6 months with Ronni Díaz NP. This plan was reviewed with the patient and patient agrees. All questions were answered.

## 2022-02-01 NOTE — DISCHARGE INSTRUCTIONS
Discharge Instructions from Dr. Lyndsay Payne    · You may shower, but no hot tubs, swimming pools, or baths until your incision is healed. · No heavy lifting with the affected extremity (nothing greater than 5 pounds), and limit its use for the next 4-5 days. · You may use an ice pack for comfort for the next couple of days, but do not place ice directly on the skin. Rather, use a towel or clothing to serve as a barrier between skin and ice to prevent injury. · Follow medication instructions carefully. · Watch for signs of infection as listed below. · Redness  · Swelling  · Drainage from the incision or from your nipple that appears infected  · Fever over 101 degrees for consecutive readings, or over 99.5 if you are currently undergoing chemotherapy. · Call our office (number is below) for a follow-up appointment. · If you have any problems, our phone number is [phone]. DISCHARGE SUMMARY from your Nurse      PATIENT INSTRUCTIONS    After general anesthesia or intravenous sedation, for 24 hours or while taking prescription Narcotics:  · Limit your activities  · Do not drive and operate hazardous machinery  · Do not make important personal or business decisions  · Do  not drink alcoholic beverages  · If you have not urinated within 8 hours after discharge, please contact your surgeon on call.     Report the following to your surgeon:  · Excessive pain, swelling, redness or odor of or around the surgical area  · Temperature over 100.5  · Nausea and vomiting lasting longer than 4 hours or if unable to take medications  · Any signs of decreased circulation or nerve impairment to extremity: change in color, persistent  numbness, tingling, coldness or increase pain  · Any questions      GOOD HELP TO FIGHT AN INFECTION  Here are a few tip to help reduce the chance of getting an infection after surgery:   Wash Your Hands   Good handwashing is the most important thing you and your caregiver can do.  Wash before and after caring for any wounds. Dry your hand with a clean towel.  Wash with soap and water for at least 20 seconds. A TIP: sing the \"Happy Birthday\" song through one time while washing to help with the timing.  Use a hand  in between washings.  Shower   When your surgeon says it is OK to take a shower, use a new bar of antibacterial soap (if that is what you use, and keep that bar of soap ONLY for your use), or antibacterial body wash.  Use a clean wash cloth or sponge when you bathe.  Dry off with a clean towel  after every bath - be careful around any wounds, skin staples, sutures or surgical glue over/on wounds.  Do not enter swimming pools, hot tubs, lakes, rivers and/or ocean until wounds are healed and your doctor/surgeon says it is OK.  Use Clean Sheets   Sleep on freshly laundered sheets after your surgery.  Keep the surgery site covered with a clean, dry bandage (if instructed to do so). If the bandage becomes soiled, reapply a new, dry, clean bandage.  Do not allow pets to sleep with you while your wound is healing.  Lifestyle Modification and Controlling Your Blood Sugar   Smoking slows wound healing. Stop smoking and limit exposure to second-hand smoke.  High blood sugar slows wound healing. Eat a well-balanced diet to provide proper nutrition while healing   Monitor your blood sugar (if you are a diabetic) and take your medications as you are suppose to so you can control you blood sugar after surgery. COUGH AND DEEP BREATHE    Breathing deeply and coughing are very important exercises to do after surgery. Deep breathing and coughing open the little air tubes and air sacks in your lungs. You take deep breaths every day. You may not even notice - it is just something you do when you sigh or yawn. It is a natural exercise you do to keep these air passages open.       After surgery, take deep breaths and cough, on purpose. DIRECTIONS:  · Take 10 to 15 slow deep breaths every hour while awake. · Breathe in deeply, and hold it for 2 seconds. · Exhale slowly through puckered lips, like blowing up a balloon. · After every 4th or 5th deep breath, hug your pillow to your chest or belly and give a hard, deep cough. Yes, it will probably hurt. But doing this exercise is a very important part of healing after surgery. Take your pain medicine to help you do this exercise without too much pain. Coughing and deep breathing help prevent bronchitis and pneumonia after surgery. If you had chest or belly surgery, use a pillow as a \"hug isabelle\" and hold it tightly to your chest or belly when you cough. ANKLE PUMPS    Ankle pumps increase the circulation of oxygenated blood to your lower extremities and decrease your risk for circulation problems such as blood clots. They also stretch the muscles, tendons and ligaments in your foot and ankle, and prevent joint contracture in the ankle and foot, especially after surgeries on the legs. It is important to do ankle pump exercises regularly after surgery because immobility increases your risk for developing a blood clot. Your doctor may also have you take an Aspirin for the next few days as well. If your doctor did not ask you to take an Aspirin, consult with him before starting Aspirin therapy on your own. The exercise is quite simple. · Slowly point your foot forward, feeling the muscles on the top of your lower leg stretch, and hold this position for 5 seconds. · Next, pull your foot back toward you as far as possible, stretching the calf muscles, and hold that position for 5 seconds. · Repeat with the other foot. · Perform 10 repetitions every hour while awake for both ankles if possible (down and then up with the foot once is one repetition).     You should feel gentle stretching of the muscles in your lower leg when doing this exercise. If you feel pain, or your range of motion is limited, don't push too hard. Only go the limit your joint and muscles will let you go. If you have increasing pain, progressively worsening leg warmth or swelling, STOP the exercise and call your doctor. MEDICATION AND   SIDE EFFECT GUIDE    The [facility] MEDICATION AND SIDE EFFECT GUIDE was provided to the PATIENT AND CARE PROVIDER. Information provided includes instruction about drug purpose and common side effects for the following medications: These are general instructions for a healthy lifestyle:    *   Please give a list of your current medications to your Primary Care Provider. *   Please update this list whenever your medications are discontinued, doses are changed, or new medications (including over-the-counter products) are added. *   Please carry medication information at all times in case of emergency situations. About Smoking  No smoking / No tobacco products  Avoid exposure to second hand smoke     Surgeon General's Warning:  Quitting smoking now greatly reduces serious risk to your health. Obesity, smoking, and sedentary lifestyle greatly increases your risk for illness and disease. A healthy diet, regular physical exercise & weight monitoring are important for maintaining a healthy lifestyle. Congestive Heart Failure  You may be retaining fluid if you have a history of heart failure or if you experience any of the following symptoms:  Weight gain of 3 pounds or more overnight or 5 pounds in a week, increased swelling in your hands or feet or shortness of breath while lying flat in bed. Please call your doctor as soon as you notice any of these symptoms; do not wait until your next office visit.       Recognize signs and symptoms of STROKE:  F -  Face looks uneven  A -  Arms unable to move or move evenly  S -  Speech slurred or non-existent  T -  Time-call 911 as soon as signs and symptoms begin-DO NOT go          back to bed or wait to see if you get better-TIME IS BRAIN. Warning Signs of HEART ATTACK   Call 911 if you have these symptoms:     Chest discomfort. Most heart attacks involve discomfort in the center of the chest that lasts more than a few minutes, or that goes away and comes back. It can feel like uncomfortable pressure, squeezing, fullness, or pain.  Discomfort in other areas of the upper body. Symptoms can include pain or discomfort in one or both arms, the back, neck, jaw, or stomach.  Shortness of breath with or without chest discomfort.  Other signs may include breaking out in a cold sweat, nausea, or lightheadedness. Don't wait more than five minutes to call 911 - MINUTES MATTER! Fast action can save your life. Calling 911 is almost always the fastest way to get lifesaving treatment. Emergency Medical Services staff can begin treatment when they arrive -- up to an hour sooner than if someone gets to the hospital by car. Learning About Coronavirus [phone])  Coronavirus [phone]): Overview  What is coronavirus (COVID-19)? The coronavirus disease (COVID-19) is caused by a virus. It is an illness that was first found in [place], [place], in December 2019. It has since spread worldwide. The virus can cause fever, cough, and trouble breathing. In severe cases, it can cause pneumonia and make it hard to breathe without help. It can cause death. Coronaviruses are a large group of viruses. They cause the common cold. They also cause more serious illnesses like Middle East respiratory syndrome (MERS) and severe acute respiratory syndrome (SARS). COVID-19 is caused by a novel coronavirus. That means it's a new type that has not been seen in people before. This virus spreads person-to-person through droplets from coughing and sneezing. It can also spread when you are close to someone who is infected.  And it can spread when you touch something that has the virus on it, such as a doorknob or a tabletop. What can you do to protect yourself from coronavirus (COVID-19)? The best way to protect yourself from getting sick is to:  · Avoid areas where there is an outbreak. · Avoid contact with people who may be infected. · Wash your hands often with soap or alcohol-based hand sanitizers. · Avoid crowds and try to stay at least 6 feet away from other people. · Wash your hands often, especially after you cough or sneeze. Use soap and water, and scrub for at least 20 seconds. If soap and water aren't available, use an alcohol-based hand . · Avoid touching your mouth, nose, and eyes. What can you do to avoid spreading the virus to others? To help avoid spreading the virus to others:  · Cover your mouth with a tissue when you cough or sneeze. Then throw the tissue in the trash. · Use a disinfectant to clean things that you touch often. · Stay home if you are sick or have been exposed to the virus. Don't go to school, work, or public areas. And don't use public transportation. · If you are sick:  ? Leave your home only if you need to get medical care. But call the doctor's office first so they know you're coming. And wear a face mask, if you have one.  ? If you have a face mask, wear it whenever you're around other people. It can help stop the spread of the virus when you cough or sneeze. ? Clean and disinfect your home every day. Use household  and disinfectant wipes or sprays. Take special care to clean things that you grab with your hands. These include doorknobs, remote controls, phones, and handles on your refrigerator and microwave. And don't forget countertops, tabletops, bathrooms, and computer keyboards. When to call for help  Call 911 anytime you think you may need emergency care. For example, call if:  · You have severe trouble breathing. (You can't talk at all.)  · You have constant chest pain or pressure.   · You are severely dizzy or lightheaded. · You are confused or can't think clearly. · Your face and lips have a blue color. · You pass out (lose consciousness) or are very hard to wake up. Call your doctor now if you develop symptoms such as:  · Shortness of breath. · Fever. · Cough. If you need to get care, call ahead to the doctor's office for instructions before you go. Make sure you wear a face mask, if you have one, to prevent exposing other people to the virus. Where can you get the latest information? The following health organizations are tracking and studying this virus. Their websites contain the most up-to-date information. Jina Melania also learn what to do if you think you may have been exposed to the virus. · U.S. Centers for Disease Control and Prevention (CDC): The CDC provides updated news about the disease and travel advice. The website also tells you how to prevent the spread of infection. www.cdc.gov  · World Health Organization [facility]): WHO offers information about the virus outbreaks. WHO also has travel advice. www.who.int  Current as of: April 1, 2020               Content Version: 12.4  © 1518-6497 [employer]. Care instructions adapted under license by your healthcare professional. If you have questions about a medical condition or this instruction, always ask your healthcare professional. [address] any warranty or liability for your use of this information. The discharge information has been reviewed with the patient. Any questions and concerns from the patient have been addressed. The patient verbalized understanding.         CONTENTS FOUND IN YOUR DISCHARGE ENVELOPE:  [x]     Surgeon and Hospital Discharge Instructions  [x]     [facility] Surgical Services Care Provider Card  []     Medication & Side Effect Guide            (your newly prescribed medications have been marked/highlighted showing the most common side effects from   the classes of drugs on your prescriptions)  []     Medication Prescription(s) x  ( [] These have been sent electronically to your pharmacy by your surgeon,   - OR -       your surgeon has already provided these to you during a previous/pre-op office visit)  []     [address]  []     Physical Therapy Prescription  []     Follow-up Appointment Cards  []     Surgery-related Pictures/Media  []     Pain block and/or block with On-Q Catheter from Anesthesia Service (information included in your instructions above)  []     Medical device information sheets/pamphlets from their    []     School/work excuse note. []     /parent work excuse note. The following personal items collected during your admission are returned to you:   Dental Appliance: Dental Appliances: Partials,Lowers  Vision:    Hearing Aid:    Jewelry:    Clothing: Clothing: Footwear,Pants,Shirt,Undergarments,Jacket/Coat  Other Valuables:  Other Valuables: Eyeglasses,Cell Phone,Purse  Valuables sent to safe:

## 2022-02-02 NOTE — OP NOTES
[facility] [place] [facility]  OPERATIVE REPORT    Name:  Ebonie Hurley  MR#:  [id number]  :  1960  ACCOUNT #:  [de-identified]  DATE OF SERVICE:  2022    PREOPERATIVE DIAGNOSIS:  Carcinoma of the breast status post chemotherapy. POSTOPERATIVE DIAGNOSIS:  Carcinoma of the breast status post chemotherapy. PROCEDURE PERFORMED:  Venous Port-A-Cath removal.    SURGEON:  Tommy Bee MD    ASSISTANT:  None. ANESTHESIA:  Local.    COMPLICATIONS:  None. SPECIMENS REMOVED:  None. IMPLANTS:  None. ESTIMATED BLOOD LOSS:  Minimal.    INDICATIONS:  The patient is a 28-year-old female who had a Port-A-Cath for chemotherapy for carcinoma of the breast who has completed chemotherapy and desires Port-A-Cath removal.    PROCEDURE:  After sterile prep and drape and local anesthesia with 1% lidocaine mixed with 0.5% Marcaine, the previous Port-A-Cath incision was opened. The Port-A-Cath was identified and removed and was noted to be intact. The wound was hemostatic. It was closed with interrupted 3-0 Vicryl and running subcuticular 4-0 Monocryl on the skin. The patient tolerated the procedure well with no immediate complications. She was taken to the recovery room in stable condition.       Juni Santos MD      [id number]  D:  2022 14:20  T:  2022 19:15  JOB #:  [id number]  CC:  MD Teri Vora MD

## 2022-03-14 ENCOUNTER — TELEPHONE (OUTPATIENT)
Dept: ONCOLOGY | Age: 62
End: 2022-03-14

## 2022-03-14 NOTE — TELEPHONE ENCOUNTER
Baldomero Freitas is a 64 y.o. female  Returned patient's call to inform her that per Dr. Reymundo King orders she can stop the anastrozole for a month then come in to see him. I transferred the patient to the  to reschedule her appointment.

## 2022-03-14 NOTE — TELEPHONE ENCOUNTER
Patient called and stated that she was advised to let team know if the medication (anastrozole) was helping. Patient states that this medication is not helping and her body is still hurting.  Please advise       CB# [phone]

## 2022-03-19 PROBLEM — Z17.0 BILATERAL MALIGNANT NEOPLASM OF BREAST IN FEMALE, ESTROGEN RECEPTOR POSITIVE (HCC): Status: ACTIVE | Noted: 2021-05-24

## 2022-03-19 PROBLEM — C50.912 BILATERAL MALIGNANT NEOPLASM OF BREAST IN FEMALE, ESTROGEN RECEPTOR POSITIVE (HCC): Status: ACTIVE | Noted: 2021-05-24

## 2022-03-19 PROBLEM — C50.911 BILATERAL MALIGNANT NEOPLASM OF BREAST IN FEMALE, ESTROGEN RECEPTOR POSITIVE (HCC): Status: ACTIVE | Noted: 2021-05-24

## 2022-04-18 ENCOUNTER — OFFICE VISIT (OUTPATIENT)
Dept: ONCOLOGY | Age: 62
End: 2022-04-18
Payer: COMMERCIAL

## 2022-04-18 VITALS
RESPIRATION RATE: 18 BRPM | TEMPERATURE: 98 F | WEIGHT: 186.2 LBS | OXYGEN SATURATION: 97 % | BODY MASS INDEX: 36.56 KG/M2 | DIASTOLIC BLOOD PRESSURE: 89 MMHG | HEART RATE: 86 BPM | HEIGHT: 60 IN | SYSTOLIC BLOOD PRESSURE: 126 MMHG

## 2022-04-18 DIAGNOSIS — Z17.0 BILATERAL MALIGNANT NEOPLASM OF BREAST IN FEMALE, ESTROGEN RECEPTOR POSITIVE, UNSPECIFIED SITE OF BREAST (HCC): Primary | ICD-10-CM

## 2022-04-18 DIAGNOSIS — C50.911 BILATERAL MALIGNANT NEOPLASM OF BREAST IN FEMALE, ESTROGEN RECEPTOR POSITIVE, UNSPECIFIED SITE OF BREAST (HCC): Primary | ICD-10-CM

## 2022-04-18 DIAGNOSIS — C50.912 BILATERAL MALIGNANT NEOPLASM OF BREAST IN FEMALE, ESTROGEN RECEPTOR POSITIVE, UNSPECIFIED SITE OF BREAST (HCC): Primary | ICD-10-CM

## 2022-04-18 PROCEDURE — 99214 OFFICE O/P EST MOD 30 MIN: CPT | Performed by: INTERNAL MEDICINE

## 2022-04-18 RX ORDER — EXEMESTANE 25 MG/1
25 TABLET ORAL DAILY
Qty: 30 TABLET | Refills: 2 | Status: SHIPPED | OUTPATIENT
Start: 2022-04-18 | End: 2022-07-15

## 2022-04-18 NOTE — PROGRESS NOTES
Gaby Yancey is a 64 y.o. female Follow up for the evaluation of breast cancer. 1. Have you been to the ER, urgent care clinic since your last visit? Hospitalized since your last visit? No    2. Have you seen or consulted any other health care providers outside of the [address] [place] [address] since your last visit? Include any pap smears or colon screening.  No

## 2022-04-18 NOTE — PROGRESS NOTES
[facility] [place] [address] [facility] [address]  W: [phone]  F: [phone]      Reason for Visit:   Saira Torres is a 64 y.o. female who is seen in follow up for breast cancer    Rad onc:  Dr. Rosie Barrientos  Breast Surgeon: Dr. Booker Jackman    Treatment History:   · 5/24/21 right breast mass core bx:  IDC, 7 mm, gr 1-2, no LVI, ER + at 100%, NV + at 95%, HER 2 negative at [facility] 1+, ki67 < 5%  · MRI bx, 6/16/21 breast left site A, posterior bx: ILC, gr 1, 6 mm, ER + at 99%, NV + at 60%, HER 2 negative at [facility] 1+, ki67 19%; left breast site B, anterior bx:  ILC, gr 1, 1.1 cm, ER + at 99%, NV negative, HER 2 negative ([facility] 2+; FISH ratio 1.1 ; sig/cell 2); ki67 18%  · 6/29/21 right breast lumpectomy:  IDC, 2.5 cm, gr 1, DCIS, gr 2, focal 2 mm, 0/7 LN, pT2 pN0 cM0; left breast lumpectomy:  Multifocal ILC, gr 1, 1.8 cm and 1.5 cm, 0/8 LN, pT1c pN0 cM0  · Mammaprint, right breast, high-risk, luminal type B  · Mammaprint, left breast, site B, low-risk, luminal type A  · 9/4/21 invitae genetic testing, negative  · TC 8/17/21- 10/19/21  · XRT completed 11/30/21  · Anastrozole 12/7/22 - stopped 3/15/2022 due to intolerance  · Transitioned to Exemestane 4/18/22-    History of Present Illness: An abnormal right mammogram led to the pathology above    Interval history: Patient presents today for follow up and treatment. She reports gr 1 fatigue, gr 1 hot flashes. Mild nausea. Reported back and knee prior to starting AI therapy (taking Tramadol PRN), but reports full body pain and intensified back and knee pain on Arimidex. States she has been off for one month now, it took a few weeks to feel better, but now back to baseline. No hot flashes or night sweats. Works in [employer] as a .      FH:  Father with lung cancer; no breast; paternal aunt with ovarian cancer, no prostate or pancreas cancer    Past Medical History:   Diagnosis Date    Asthma     Cancer ([facility] [address] 06/2021    bilateral breast cancer    COVID-19 02/2021    GERD (gastroesophageal reflux disease)     Hyperlipemia     Hypertension       Past Surgical History:   Procedure Laterality Date    HX BREAST LUMPECTOMY Bilateral 6/29/2021    LEFT BREAST LUMPECTOMY WITH BRACKETED NEEDLE LOCALIZATION, LEFT BREAST SENTINEL NODE BIOPSY, RIGHT BREAST LUMPECTOMY WITH ULTRASOUND, RIGHT BREAST SENTINEL NODE BIOPSY performed by Marco A Ellis MD at [address] HX ORTHOPAEDIC Left 2016    parital left knee arthroplasty    [facility] ABDOMEN SURGERY PROC UNLISTED  2006    lap nissen      Social History     Tobacco Use    Smoking status: Never Smoker    Smokeless tobacco: Never Used   Substance Use Topics    Alcohol use: Not Currently     Comment:  2 drinks per year      Family History   Problem Relation Age of Onset    Lung Cancer Father      Current Outpatient Medications   Medication Sig    bioflav,lemon/vit Bcomp,C (LIPO-FLAVONOID PLUS PO) Take  by mouth.  OTHER,NON-FORMULARY, FLAVANOID    OTHER 6,000 mcg. HAIR, SKIN AND NAIL GUMMIES.  prochlorperazine (COMPAZINE) 10 mg tablet Take 1 Tablet by mouth every six (6) hours as needed for Nausea.  ondansetron hcl (ZOFRAN) 8 mg tablet Take 1 Tablet by mouth every eight (8) hours as needed for Nausea.  rosuvastatin (CRESTOR) 20 mg tablet Take 20 mg by mouth daily.  cholecalciferol, vitamin D3, (Vitamin D3) 50 mcg (2,000 unit) tab Take 1 Tablet by mouth daily.  omeprazole (PRILOSEC) 20 mg capsule Take 20 mg by mouth daily.  celecoxib (CELEBREX) 200 mg capsule TAKE 1 CAPSULE BY MOUTH ONCE DAILY WITH FOOD FOR 30 DAYS    lisinopril-hydroCHLOROthiazide (PRINZIDE, ZESTORETIC) 20-25 mg per tablet Take 1 Tablet by mouth daily.  traMADoL (ULTRAM) 50 mg tablet Take 50 mg by mouth every six (6) hours as needed for Pain.  anastrozole (ARIMIDEX) 1 mg tablet Take 1 mg by mouth daily.  To start 1 week after completion of radiation (Patient not taking: Reported on 4/18/2022)    lidocaine-prilocaine (EMLA) topical cream Apply  to affected area as needed for Pain. (Patient not taking: Reported on 4/18/2022)    dexAMETHasone (DECADRON) 4 mg tablet 8 mg PO bid the day before and day after chemo (Patient not taking: Reported on 4/18/2022)     No current facility-administered medications for this visit. No Known Allergies     Review of Systems: A complete review of systems was obtained, negative except as described above. Physical Exam:     Visit Vitals  /89   Pulse 86   Temp 98 °F (36.7 °C) (Temporal)   Resp 18   Ht 5' (1.524 m)   Wt 186 lb 3.2 oz (84.5 kg)   SpO2 97%   BMI 36.36 kg/m²     ECOG PS: 0    Constitutional: Appears well-developed and well-nourished in no apparent distress      Mental status: Alert and awake, Oriented to person/place/time, Able to follow commands    Eyes: EOM normal, Sclera normal, No visible ocular discharge    HENT: Normocephalic, atraumatic    Mouth/Throat: Moist mucous membranes    External Ears normal    Neck: No visualized mass    Pulmonary/Chest: Respiratory effort normal, No visualized signs of difficulty breathing or respiratory distress    Musculoskeletal: Normal gait with no signs of ataxia, Normal range of motion of neck    Neurological: No facial asymmetry (Cranial nerve 7 motor function), No gaze palsy    Skin: No significant exanthematous lesions or discoloration noted on facial skin    Psychiatric: Normal affect, No hallucinations       Results:     Lab Results   Component Value Date/Time    WBC 5.9 10/27/2021 08:07 AM    HGB 12.0 10/27/2021 08:07 AM    HCT 36.8 10/27/2021 08:07 AM    PLATELET 824 93/14/8961 08:07 AM    MCV 91.8 10/27/2021 08:07 AM    ABS.  NEUTROPHILS 2.8 10/27/2021 08:07 AM     Lab Results   Component Value Date/Time    Sodium 138 10/27/2021 08:07 AM    Potassium 3.7 10/27/2021 08:07 AM    Chloride 104 10/27/2021 08:07 AM    CO2 29 10/27/2021 08:07 AM    Glucose 81 10/27/2021 08:07 AM    BUN 13 10/27/2021 08:07 AM    Creatinine 0.68 10/27/2021 08:07 AM    GFR est AA >60 10/27/2021 08:07 AM    GFR est non-AA >60 10/27/2021 08:07 AM    Calcium 9.4 10/27/2021 08:07 AM     Lab Results   Component Value Date/Time    Bilirubin, total 0.4 10/27/2021 08:07 AM    ALT (SGPT) 23 10/27/2021 08:07 AM    Alk. phosphatase 75 10/27/2021 08:07 AM    Protein, total 6.7 10/27/2021 08:07 AM    Albumin 3.5 10/27/2021 08:07 AM    Globulin 3.2 10/27/2021 08:07 AM     6/8/21 MRI breast  FINDINGS:      Background enhancement: Minimal.     Right Breast: At about the 8:00 position and middle depth of the right breast  there is a 5 x 6 x 8 mm enhancing nodule adjacent to the posterior aspect of  HydroMark clip signal void. There are no other enhancing lesions to suggest any  additional sites of invasive breast carcinoma.     Left Breast: There is a 7 x 9 x 11 mm mass with washout kinetics in the 12:00  position of the central left breast near the junction of the posterior middle  thirds. Slightly superior to this at 12:00 at approximately the junction of the  anterior middle thirds there is a 6 x 9 x 9 mm enhancing focus with some minimal  washout kinetics. There are no other spacious enhancing lesions identified.     Lymph: Unremarkable.     Chest wall and visualized abdomen: Unremarkable.     IMPRESSION  Known right breast carcinoma with no additional right breast lesions  identified. There are two suspicious enhancing masses left breast.     Right Breast:  BI-RADS Assessment Category 6: Known biopsy proven malignancy-  Appropriate action should be taken.     Left Breast:   BI-RADS Assessment Category 4: Suspicious abnormality - Biopsy  should be performed in the absence of clinical contraindication.     RECOMMENDATION: MRI guided left breast biopsy of two lesions. Records reviewed and summarized above. Pathology report(s) reviewed above. Radiology report(s) reviewed above. Assessment/plan:   1.  Right breast LOQ IDC, 2.5 cm, 0/7 LN, gr 2, ER +, ID +, HER 2 negative, stage IIA, prognostic stage IA  High risk mammaprint    We explained to the patient that the goal of systemic adjuvant therapy is to improve the chances for cure and decrease the risk of relapse. We explained why a patient can have microscopic cancer spread now even though physical examination, laboratory studies and imaging studies are negative for cancer. We explained that the same treatments used now as adjuvant or preventive treatments rarely if ever are curative in women who develop metastases. She is agreeable to anastrozole. Started this 12/7/22. Tolerating poorly with joint/body aches. Stopped 3/2022, will transition to Exemestane 25 mg daily. Mammogram in May. Port has been removed by Dr. Eloisa Harper. 2. Left breast central ILC, gr 1, multifocal, 1.8 cm and 1.5 cm, ER +, ID +, HER 2 negative and ER +, ID negative, HER 2 negative, 0/8 LN involved, stage IA both anatomic and prognostic  Low risk mammaprint    See treatment discussion above for higher risk disease    3. Emotional well being:  She has excellent support and is coping well with her disease    4. Genetic testing:   discussed potential ramifications of a positive test including the potential need for bilateral mastectomies and bilateral oophorectomies and the risk then for her family members to also have a mutation. VUS also discussed. invitae testing negative    5. Fatigue/Dizziness: fatigue continues but improved since last visit. 6. Rapid Heart Rate: patient reports flutter to chest on occasion she feels is due to anxiety. Denies concurrent chest pain or dyspnea. EKG not performed. This continues and she has seen PCP for management. Discussed option of EKG for evaluation and cardiology evaluation, but due to stability and no associated symptoms she prefers to hold off on this for now. 7. Osteopenia: per DEXA on 4/23/21.  She is taking 2000 international units of vitamin D3 and 2-3 servings of dietary calcium. We discussed the data from [address] [address], [address] [facility] 2013, for the meta-analysis for adjuvant bisphosphonate use in breast cancer. We discussed that in postmenopausal women, it appears that the bisphosphate zolendronic acid, given as in ABCSG 12 at 4 mg IV q 6 months x 3 years, is beneficial in improving bone DFS and OS. We discussed side effects such as ONJ and the need to avoid invasive dental procedures while on these medications, renal damage, and hypocalcemia. She is agreeable to this but has not seen a dentist in about one year. She will fu with dentist upcoming. 8. Joint Pain: most likely due to AI and bothersome. Recommend tart cherry juice and omgea 3 FA 3g/day, has not started this yet, she will initiate. Arimidex on hold x 1 month, will now transition to Exemestane. Follow up in 3 months, or sooner if needed. I personally saw and evaluated the patient and performed the entire medical decision making. The history, physical exam, and documentation were performed by Minetta Spurling, NP. I reviewed and verified the above documentation and modified it as needed. Right breast cancer, ER +, changing to exemestane from anastrozole due to joint pain. Will discuss zometa again after her dental appointment. She is following up with pcp for rapid heart rate. Feels well. RTC 3 months      Signed By: Dmitriy Dailey MD      No orders of the defined types were placed in this encounter.

## 2022-05-06 ENCOUNTER — HOSPITAL ENCOUNTER (OUTPATIENT)
Dept: MAMMOGRAPHY | Age: 62
Discharge: HOME | End: 2022-05-06
Attending: NURSE PRACTITIONER
Payer: COMMERCIAL

## 2022-05-06 DIAGNOSIS — Z85.3 HISTORY OF BREAST CANCER IN FEMALE: ICD-10-CM

## 2022-05-06 PROCEDURE — 77062 BREAST TOMOSYNTHESIS BI: CPT

## 2022-07-15 ENCOUNTER — OFFICE VISIT (OUTPATIENT)
Dept: SURGERY | Age: 62
End: 2022-07-15
Payer: COMMERCIAL

## 2022-07-15 VITALS — DIASTOLIC BLOOD PRESSURE: 73 MMHG | HEART RATE: 84 BPM | SYSTOLIC BLOOD PRESSURE: 115 MMHG

## 2022-07-15 DIAGNOSIS — C50.911 BILATERAL MALIGNANT NEOPLASM OF BREAST IN FEMALE, ESTROGEN RECEPTOR POSITIVE, UNSPECIFIED SITE OF BREAST (HCC): Primary | ICD-10-CM

## 2022-07-15 DIAGNOSIS — Z92.3 S/P RADIATION THERAPY: ICD-10-CM

## 2022-07-15 DIAGNOSIS — Z17.0 BILATERAL MALIGNANT NEOPLASM OF BREAST IN FEMALE, ESTROGEN RECEPTOR POSITIVE, UNSPECIFIED SITE OF BREAST (HCC): Primary | ICD-10-CM

## 2022-07-15 DIAGNOSIS — Z85.3 HISTORY OF BREAST CANCER IN FEMALE: ICD-10-CM

## 2022-07-15 DIAGNOSIS — Z98.890 S/P LUMPECTOMY OF BREAST: ICD-10-CM

## 2022-07-15 DIAGNOSIS — C50.912 BILATERAL MALIGNANT NEOPLASM OF BREAST IN FEMALE, ESTROGEN RECEPTOR POSITIVE, UNSPECIFIED SITE OF BREAST (HCC): Primary | ICD-10-CM

## 2022-07-15 PROCEDURE — 99213 OFFICE O/P EST LOW 20 MIN: CPT | Performed by: NURSE PRACTITIONER

## 2022-07-15 RX ORDER — EXEMESTANE 25 MG/1
TABLET ORAL
Qty: 30 TABLET | Refills: 0 | Status: SHIPPED | OUTPATIENT
Start: 2022-07-15 | End: 2022-08-23

## 2022-07-15 NOTE — PROGRESS NOTES
HISTORY OF PRESENT ILLNESS  Iris Clorox Low Sequeira is a 64 y.o. female. HPI Established patient presents for follow-up for BILATERAL breast cancer. Denies breast mass, worrisome skin changes, nipple discharge and pain.          Breast history -   Referring - Dr. Valere Landau  · 5/24/21 right breast mass core bx:  IDC, 7 mm, gr 1-2, no LVI, ER + at 100%, SC + at 95%, HER 2 negative at [facility] 1+, ki67 < 5%  · MRI bx, 6/16/21 breast left site A, posterior bx: ILC, gr 1, 6 mm, ER + at 99%, SC + at 60%, HER 2 negative at [facility] 1+, ki67 19%; left breast site B, anterior bx:  ILC, gr 1, 1.1 cm, ER + at 99%, SC negative, HER 2 negative ([facility] 2+; FISH ratio 1.1; sig/cell 2); ki67 18%  · 6/29/21 - BILATERAL lumpectomies and SLNBs - Dr. Karo Medrano  · [birth year] - RIGHT breast lumpectomy:  IDC, 2.5 cm, gr 1, DCIS, gr 2, focal 2 mm, 0/7 LN, pT2 pN0 cM0  · 6/29/21 - LEFT breast lumpectomy:  Multifocal ILC, gr 1, 1.8 cm and 1.5 cm, 0/8 LN, pT1c pN0 cM0  · Mammaprint, right breast, high-risk, luminal type B  · Mammaprint, left breast, site B, low-risk, luminal type A  · TC 8/17/21- 10/19/21- Dr. Ella Desai  · 11/2021 - completed XRT - Dr. Tere Gaitan  · 1/2022 - started anastrozole - Dr. Ella Desai  · 2/2022 - port removal  · 4/2022 - switched to exemestane - Dr. Gina Swan history -   Father with lung cancer  Paternal aunt with ovarian cancer  No breast, prostate or pancreatic cancer  9/4/21 - [facility] genetic testing - negative      OB History    No obstetric history on file. Obstetric Comments   Menarche:  5. LMP: 2014. # of Children:  4. Age at Delivery of First Child:  13.   Hysterectomy/oophorectomy:  NO/?.  Breast Bx:  no.  Hx of Breast Feeding:  ? Ramo Hernandez BCP:  ? . Hormone therapy:  no.                  Past Surgical History:   Procedure Laterality Date    HX BREAST LUMPECTOMY Bilateral 6/29/2021    LEFT BREAST LUMPECTOMY WITH BRACKETED NEEDLE LOCALIZATION, LEFT BREAST SENTINEL NODE BIOPSY, RIGHT BREAST LUMPECTOMY WITH ULTRASOUND, RIGHT BREAST SENTINEL NODE BIOPSY performed by Arash Norris MD at [address] HX ORTHOPAEDIC Left 2016    parital left knee arthroplasty    [place] ABDOMEN SURGERY PROC UNLISTED  2006    lap nissen         [facility] Results (most recent):  Results from Hospital Encounter encounter on 05/06/22    [facility] 3D LULY W MAMMO BI DX INCL CAD    Narrative  INDICATION: April 23, 2021. COMPARISON: None  . COMPOSITION the breast parenchymal pattern is scattered fibroglandular  densities. .  TECHNIQUE: Standard 2D, CC and MLO views of each breast were performed with  digital technique and subjected to computer-aided detection. Tomosynthesis of  each breast was performed in CC and MLO projections. Magnification views of the  breasts bilaterally were performed. [facility] FINDINGS:  Lumpectomy changes are seen bilaterally. No new masses or microcalcifications  are seen. .    Impression  1. BI-RADS category 2, benign. 2. The patient should return in one year for diagnostic bilateral mammography. 3. She was informed. ROS    Physical Exam  Constitutional:       Appearance: Normal appearance. Chest:   Breasts:      Right: No mass, nipple discharge, skin change, tenderness, axillary adenopathy or supraclavicular adenopathy. Left: No mass, nipple discharge, skin change, tenderness, axillary adenopathy or supraclavicular adenopathy. Comments: Well healed surgical incisions bilaterally with XRT changes  Musculoskeletal:      Comments: FROM - UE x 2   Lymphadenopathy:      Upper Body:      Right upper body: No supraclavicular or axillary adenopathy. Left upper body: No supraclavicular or axillary adenopathy. Neurological:      Mental Status: She is alert.    Psychiatric:         Attention and Perception: Attention normal.         Mood and Affect: Mood normal.         Speech: Speech normal.         Behavior: Behavior normal.         Visit Vitals  /73   Pulse 84         ASSESSMENT and PLAN    ICD-10-CM ICD-9-CM 1. Bilateral malignant neoplasm of breast in female, estrogen receptor positive, unspecified site of breast ([facility] [address]  C50.911 174.9     Z17.0 V86.0     C50.912     2. S/P lumpectomy of breast  Z98.890 V45.89    3. S/P radiation therapy  Z92.3 V66.1    4. History of breast cancer in female  Z85.3 V10.3         Normal exam with no evidence of local recurrence. Reviewed normal changes to the breast post treatment. Switched AI and tolerating well. BDmammogram 3D in 5/2023. RTC in 6 months or sooner PRN. She is comfortable with this plan. All questions answered and she stated understanding. Total time spent for this patient - 20 minutes.

## 2022-07-25 ENCOUNTER — OFFICE VISIT (OUTPATIENT)
Dept: ONCOLOGY | Age: 62
End: 2022-07-25
Payer: COMMERCIAL

## 2022-07-25 ENCOUNTER — RESEARCH ENCOUNTER (OUTPATIENT)
Dept: ONCOLOGY | Age: 62
End: 2022-07-25

## 2022-07-25 VITALS
HEIGHT: 60 IN | DIASTOLIC BLOOD PRESSURE: 82 MMHG | WEIGHT: 190.4 LBS | TEMPERATURE: 98 F | SYSTOLIC BLOOD PRESSURE: 126 MMHG | BODY MASS INDEX: 37.38 KG/M2 | RESPIRATION RATE: 18 BRPM | OXYGEN SATURATION: 95 % | HEART RATE: 86 BPM

## 2022-07-25 DIAGNOSIS — C50.911 BILATERAL MALIGNANT NEOPLASM OF BREAST IN FEMALE, ESTROGEN RECEPTOR POSITIVE, UNSPECIFIED SITE OF BREAST (HCC): ICD-10-CM

## 2022-07-25 DIAGNOSIS — Z17.0 BILATERAL MALIGNANT NEOPLASM OF BREAST IN FEMALE, ESTROGEN RECEPTOR POSITIVE, UNSPECIFIED SITE OF BREAST (HCC): ICD-10-CM

## 2022-07-25 DIAGNOSIS — C50.912 BILATERAL MALIGNANT NEOPLASM OF BREAST IN FEMALE, ESTROGEN RECEPTOR POSITIVE, UNSPECIFIED SITE OF BREAST (HCC): ICD-10-CM

## 2022-07-25 DIAGNOSIS — R42 VERTIGO: ICD-10-CM

## 2022-07-25 DIAGNOSIS — C50.912 BILATERAL MALIGNANT NEOPLASM OF BREAST IN FEMALE, ESTROGEN RECEPTOR POSITIVE, UNSPECIFIED SITE OF BREAST (HCC): Primary | ICD-10-CM

## 2022-07-25 DIAGNOSIS — C50.911 BILATERAL MALIGNANT NEOPLASM OF BREAST IN FEMALE, ESTROGEN RECEPTOR POSITIVE, UNSPECIFIED SITE OF BREAST (HCC): Primary | ICD-10-CM

## 2022-07-25 DIAGNOSIS — Z17.0 BILATERAL MALIGNANT NEOPLASM OF BREAST IN FEMALE, ESTROGEN RECEPTOR POSITIVE, UNSPECIFIED SITE OF BREAST (HCC): Primary | ICD-10-CM

## 2022-07-25 PROBLEM — M85.89 OSTEOPENIA OF MULTIPLE SITES: Status: ACTIVE | Noted: 2022-07-25

## 2022-07-25 LAB
BASOPHILS # BLD: 0 K/UL (ref 0–0.1)
BASOPHILS NFR BLD: 1 % (ref 0–1)
DIFFERENTIAL METHOD BLD: ABNORMAL
EOSINOPHIL # BLD: 0.3 K/UL (ref 0–0.4)
EOSINOPHIL NFR BLD: 3 % (ref 0–7)
ERYTHROCYTE [DISTWIDTH] IN BLOOD BY AUTOMATED COUNT: 13.9 % (ref 11.5–14.5)
HCT VFR BLD AUTO: 42.6 % (ref 35–47)
HGB BLD-MCNC: 14.2 G/DL (ref 11.5–16)
IMM GRANULOCYTES # BLD AUTO: 0 K/UL (ref 0–0.04)
IMM GRANULOCYTES NFR BLD AUTO: 1 % (ref 0–0.5)
LYMPHOCYTES # BLD: 1.6 K/UL (ref 0.8–3.5)
LYMPHOCYTES NFR BLD: 22 % (ref 12–49)
MCH RBC QN AUTO: 30.2 PG (ref 26–34)
MCHC RBC AUTO-ENTMCNC: 33.3 G/DL (ref 30–36.5)
MCV RBC AUTO: 90.6 FL (ref 80–99)
MONOCYTES # BLD: 0.6 K/UL (ref 0–1)
MONOCYTES NFR BLD: 8 % (ref 5–13)
NEUTS SEG # BLD: 4.9 K/UL (ref 1.8–8)
NEUTS SEG NFR BLD: 65 % (ref 32–75)
NRBC # BLD: 0 K/UL (ref 0–0.01)
NRBC BLD-RTO: 0 PER 100 WBC
PLATELET # BLD AUTO: 245 K/UL (ref 150–400)
PMV BLD AUTO: 9.8 FL (ref 8.9–12.9)
RBC # BLD AUTO: 4.7 M/UL (ref 3.8–5.2)
TSH SERPL DL<=0.05 MIU/L-ACNC: 2.01 UIU/ML (ref 0.36–3.74)
WBC # BLD AUTO: 7.3 K/UL (ref 3.6–11)

## 2022-07-25 PROCEDURE — 99214 OFFICE O/P EST MOD 30 MIN: CPT | Performed by: INTERNAL MEDICINE

## 2022-07-25 RX ORDER — HEPARIN 100 UNIT/ML
500 SYRINGE INTRAVENOUS AS NEEDED
Status: CANCELLED
Start: 2022-08-24

## 2022-07-25 RX ORDER — HYDROCORTISONE SODIUM SUCCINATE 100 MG/2ML
100 INJECTION, POWDER, FOR SOLUTION INTRAMUSCULAR; INTRAVENOUS AS NEEDED
Status: CANCELLED | OUTPATIENT
Start: 2022-08-24

## 2022-07-25 RX ORDER — SODIUM CHLORIDE 9 MG/ML
5-250 INJECTION, SOLUTION INTRAVENOUS AS NEEDED
Status: CANCELLED | OUTPATIENT
Start: 2022-08-24

## 2022-07-25 RX ORDER — DIPHENHYDRAMINE HYDROCHLORIDE 50 MG/ML
50 INJECTION, SOLUTION INTRAMUSCULAR; INTRAVENOUS AS NEEDED
Status: CANCELLED
Start: 2022-08-24

## 2022-07-25 RX ORDER — SODIUM CHLORIDE 0.9 % (FLUSH) 0.9 %
5-40 SYRINGE (ML) INJECTION AS NEEDED
Status: CANCELLED | OUTPATIENT
Start: 2022-08-24

## 2022-07-25 RX ORDER — ONDANSETRON 2 MG/ML
8 INJECTION INTRAMUSCULAR; INTRAVENOUS AS NEEDED
Status: CANCELLED | OUTPATIENT
Start: 2022-08-24

## 2022-07-25 RX ORDER — DIPHENHYDRAMINE HYDROCHLORIDE 50 MG/ML
25 INJECTION, SOLUTION INTRAMUSCULAR; INTRAVENOUS AS NEEDED
Status: CANCELLED
Start: 2022-08-24

## 2022-07-25 RX ORDER — ALBUTEROL SULFATE 0.83 MG/ML
2.5 SOLUTION RESPIRATORY (INHALATION) AS NEEDED
Status: CANCELLED
Start: 2022-08-24

## 2022-07-25 RX ORDER — SODIUM CHLORIDE 9 MG/ML
5-40 INJECTION INTRAMUSCULAR; INTRAVENOUS; SUBCUTANEOUS AS NEEDED
Status: CANCELLED | OUTPATIENT
Start: 2022-08-24

## 2022-07-25 RX ORDER — ACETAMINOPHEN 325 MG/1
650 TABLET ORAL AS NEEDED
Status: CANCELLED
Start: 2022-08-24

## 2022-07-25 RX ORDER — EPINEPHRINE 1 MG/ML
0.3 INJECTION, SOLUTION, CONCENTRATE INTRAVENOUS AS NEEDED
Status: CANCELLED | OUTPATIENT
Start: 2022-08-24

## 2022-07-25 NOTE — PROGRESS NOTES
[facility] [place] [address]  [address] [facility], [address] [facility] [address] [facility]  Sage Cortez: [phone]  F: [phone]      Reason for Visit:   Denise Tim is a 64 y.o. female who is seen in follow up for breast cancer    Rad onc:  Dr. Bessie Lo  Breast Surgeon: Dr. Liya Elizabeth    Treatment History:   5/24/21 right breast mass core bx:  IDC, 7 mm, gr 1-2, no LVI, ER + at 100%, WV + at 95%, HER 2 negative at [facility] 1+, ki67 < 5%  MRI bx, 6/16/21 breast left site A, posterior bx: ILC, gr 1, 6 mm, ER + at 99%, WV + at 60%, HER 2 negative at [facility] 1+, ki67 19%; left breast site B, anterior bx:  ILC, gr 1, 1.1 cm, ER + at 99%, WV negative, HER 2 negative (IHC 2+; FISH ratio 1.1 ; sig/cell 2); ki67 18%  6/29/21 right breast lumpectomy:  IDC, 2.5 cm, gr 1, DCIS, gr 2, focal 2 mm, 0/7 LN, pT2 pN0 cM0; left breast lumpectomy:  Multifocal ILC, gr 1, 1.8 cm and 1.5 cm, 0/8 LN, pT1c pN0 cM0  Mammaprint, right breast, high-risk, luminal type B  Mammaprint, left breast, site B, low-risk, luminal type A  9/4/21 invitae genetic testing, negative  TC 8/17/21- 10/19/21  XRT completed 11/30/21  Anastrozole 12/7/22 - 3/15/2022 (joint pain)   Exemestane 4/18/22- current    History of Present Illness: An abnormal right mammogram led to the pathology above    Interval history: Patient presents today for follow up on exemestane. Reports gr 2 fatigue, gr 2 hot flashes, gr 2 pain/cramping rated 4/10, gr 1 mouth sores, gr 1 skin rash. Works in [employer] as a .      FH:  Father with lung cancer; no breast; paternal aunt with ovarian cancer, no prostate or pancreas cancer    Past Medical History:   Diagnosis Date    Asthma     Breast cancer ([facility] [address] 2021    bi lateral breast cancers    Cancer ([facility] [address] 06/2021    bilateral breast cancer    COVID-19 02/2021    GERD (gastroesophageal reflux disease)     History of chemotherapy 2021    Hyperlipemia     Hypertension     Radiation therapy complication 7437    bi lat Past Surgical History:   Procedure Laterality Date    HX BREAST LUMPECTOMY Bilateral 6/29/2021    LEFT BREAST LUMPECTOMY WITH BRACKETED NEEDLE LOCALIZATION, LEFT BREAST SENTINEL NODE BIOPSY, RIGHT BREAST LUMPECTOMY WITH ULTRASOUND, RIGHT BREAST SENTINEL NODE BIOPSY performed by Unique Zacarias MD at [address] [facility]    HX ORTHOPAEDIC Left 2016    parital left knee arthroplasty    [place] ABDOMEN SURGERY PROC UNLISTED  2006    lap nissen      Social History     Tobacco Use    Smoking status: Never    Smokeless tobacco: Never   Substance Use Topics    Alcohol use: Not Currently     Comment:  2 drinks per year      Family History   Problem Relation Age of Onset    Lung Cancer Father      Current Outpatient Medications   Medication Sig    exemestane (AROMASIN) 25 mg tablet Take 1 tablet by mouth once daily    bioflav,lemon/vit Bcomp,C (LIPO-FLAVONOID PLUS PO) Take  by mouth. OTHER,NON-FORMULARY, FLAVANOID    OTHER 6,000 mcg. HAIR, SKIN AND NAIL GUMMIES. prochlorperazine (COMPAZINE) 10 mg tablet Take 1 Tablet by mouth every six (6) hours as needed for Nausea. ondansetron hcl (ZOFRAN) 8 mg tablet Take 1 Tablet by mouth every eight (8) hours as needed for Nausea. rosuvastatin (CRESTOR) 20 mg tablet Take 20 mg by mouth daily. cholecalciferol, vitamin D3, 50 mcg (2,000 unit) tab Take 1 Tablet by mouth daily. omeprazole (PRILOSEC) 20 mg capsule Take 20 mg by mouth daily. celecoxib (CELEBREX) 200 mg capsule TAKE 1 CAPSULE BY MOUTH ONCE DAILY WITH FOOD FOR 30 DAYS    lisinopril-hydroCHLOROthiazide (PRINZIDE, ZESTORETIC) 20-25 mg per tablet Take 1 Tablet by mouth daily. traMADoL (ULTRAM) 50 mg tablet Take 50 mg by mouth every six (6) hours as needed for Pain. No current facility-administered medications for this visit. No Known Allergies     Review of Systems: A complete review of systems was obtained, negative except as described above.     Physical Exam:     Visit Vitals  /82   Pulse 86   Temp 98 °F (36.7 °C) (Temporal)   Resp 18   Ht 5' (1.524 m)   Wt 190 lb 6.4 oz (86.4 kg)   SpO2 95%   BMI 37.18 kg/m²     ECOG PS: 0    Constitutional: Appears well-developed and well-nourished in no apparent distress      Mental status: Alert and awake, Oriented to person/place/time, Able to follow commands    Eyes: EOM normal, Sclera normal, No visible ocular discharge    HENT: Normocephalic, atraumatic    Mouth/Throat: Moist mucous membranes    External Ears normal    Neck: No visualized mass    Pulmonary/Chest: Respiratory effort normal, No visualized signs of difficulty breathing or respiratory distress    Musculoskeletal: Normal gait with no signs of ataxia, Normal range of motion of neck    Neurological: No facial asymmetry (Cranial nerve 7 motor function), No gaze palsy    Skin: No significant exanthematous lesions or discoloration noted on facial skin    Psychiatric: Normal affect, No hallucinations       Results:     Lab Results   Component Value Date/Time    WBC 5.9 10/27/2021 08:07 AM    HGB 12.0 10/27/2021 08:07 AM    HCT 36.8 10/27/2021 08:07 AM    PLATELET 729 49/18/6599 08:07 AM    MCV 91.8 10/27/2021 08:07 AM    ABS. NEUTROPHILS 2.8 10/27/2021 08:07 AM     Lab Results   Component Value Date/Time    Sodium 138 10/27/2021 08:07 AM    Potassium 3.7 10/27/2021 08:07 AM    Chloride 104 10/27/2021 08:07 AM    CO2 29 10/27/2021 08:07 AM    Glucose 81 10/27/2021 08:07 AM    BUN 13 10/27/2021 08:07 AM    Creatinine 0.68 10/27/2021 08:07 AM    GFR est AA >60 10/27/2021 08:07 AM    GFR est non-AA >60 10/27/2021 08:07 AM    Calcium 9.4 10/27/2021 08:07 AM     Lab Results   Component Value Date/Time    Bilirubin, total 0.4 10/27/2021 08:07 AM    ALT (SGPT) 23 10/27/2021 08:07 AM    Alk.  phosphatase 75 10/27/2021 08:07 AM    Protein, total 6.7 10/27/2021 08:07 AM    Albumin 3.5 10/27/2021 08:07 AM    Globulin 3.2 10/27/2021 08:07 AM     6/8/21 MRI breast  FINDINGS:      Background enhancement: Minimal. Right Breast: At about the 8:00 position and middle depth of the right breast  there is a 5 x 6 x 8 mm enhancing nodule adjacent to the posterior aspect of  HydroMark clip signal void. There are no other enhancing lesions to suggest any  additional sites of invasive breast carcinoma. Left Breast: There is a 7 x 9 x 11 mm mass with washout kinetics in the 12:00  position of the central left breast near the junction of the posterior middle  thirds. Slightly superior to this at 12:00 at approximately the junction of the  anterior middle thirds there is a 6 x 9 x 9 mm enhancing focus with some minimal  washout kinetics. There are no other spacious enhancing lesions identified. Lymph: Unremarkable. Chest wall and visualized abdomen: Unremarkable. IMPRESSION  Known right breast carcinoma with no additional right breast lesions  identified. There are two suspicious enhancing masses left breast.     Right Breast:  BI-RADS Assessment Category 6: Known biopsy proven malignancy-  Appropriate action should be taken. Left Breast:   BI-RADS Assessment Category 4: Suspicious abnormality - Biopsy  should be performed in the absence of clinical contraindication. RECOMMENDATION: MRI guided left breast biopsy of two lesions. Records reviewed and summarized above. Pathology report(s) reviewed above. Radiology report(s) reviewed above. Assessment/plan:   1. Right breast LOQ IDC, 2.5 cm, 0/7 LN, gr 2, ER +, RI +, HER 2 negative, stage IIA, prognostic stage IA  High risk mammaprint    We explained to the patient that the goal of systemic adjuvant therapy is to improve the chances for cure and decrease the risk of relapse. We explained why a patient can have microscopic cancer spread now even though physical examination, laboratory studies and imaging studies are negative for cancer.  We explained that the same treatments used now as adjuvant or preventive treatments rarely if ever are curative in women who develop metastases. She is agreeable to anastrozole. Started this 12/7/22. Tolerated poorly with joint/body aches. Stopped 3/2022. Switched to Exemestane 25 mg daily 4/2022, tolerating that well with mild-moderate fatigue. Mammogram in May 2023    Port has been removed by Dr. Gautam Hudson. 2. Left breast central ILC, gr 1, multifocal, 1.8 cm and 1.5 cm, ER +, ID +, HER 2 negative and ER +, ID negative, HER 2 negative, 0/8 LN involved, stage IA both anatomic and prognostic  Low risk mammaprint    See treatment discussion above for higher risk disease    3. Emotional well being:  She has excellent support and is coping well with her disease    4. Genetic testing:   discussed potential ramifications of a positive test including the potential need for bilateral mastectomies and bilateral oophorectomies and the risk then for her family members to also have a mutation. VUS also discussed. invitae testing negative    5. Fatigue: fatigue continues and worsened in the last two weeks. Discussed NCI wellbutrin study, she rates her fatigue 4/10 today. TSH and CBC to check as well. Will have research meet with her today    6. Rapid Heart Rate: patient reports flutter to chest on occasion she feels is due to anxiety. Denies concurrent chest pain or dyspnea. EKG not performed. This continues and she has seen PCP for management. Discussed option of EKG for evaluation and cardiology evaluation, but due to stability and no associated symptoms she prefers to hold off. NO recurrence or this since last visit. 7. Osteopenia: per DEXA on 4/23/21. She is taking 2000 international units of vitamin D3 and 2-3 servings of dietary calcium. We discussed the data from [address] [facility], [facility] 2013, for the meta-analysis for adjuvant bisphosphonate use in breast cancer.   We discussed that in postmenopausal women, it appears that the bisphosphate zolendronic acid, given as in ABCSG 12 at 4 mg IV q 6 months x 3 years, is beneficial in improving bone DFS and OS. We discussed side effects such as ONJ and the need to avoid invasive dental procedures while on these medications, renal damage, and hypocalcemia. She is agreeable to this. She did not see the dentist but states she has no dental issues and wishes to start. Will set this up for next week    8. Joint Pain: most likely due to AI and bothersome. Recommended tart cherry juice and omgea 3 FA 3g/day, she did not take. Improved on exemestane. 9. Vertigo/ringing in ears: this has been present for many years but worsened, she is trying to get an appt with ENT and following up with PCP. I personally saw and evaluated the patient and performed the entire medical decision making. The history, physical exam, and documentation were performed by Denise Ellison NP. I reviewed and verified the above documentation and modified it as needed. Right breast cancer, ER +, on exemestane, LOGAN, continue. Joint pain improved. Fatigue worse, 4/10, discussed wellbutrin study, research to meet with her today. Referred to ENT for vertigo. Will set up zometa for osteopenia. TSH and CBC today. Signed By: Tom Monreal MD      No orders of the defined types were placed in this encounter.

## 2022-07-25 NOTE — PROGRESS NOTES
Semaj Ellington is a 64 y.o. female Follow up for the evaluation of breast cancer. 1. Have you been to the ER, urgent care clinic since your last visit? Hospitalized since your last visit? No    2. Have you seen or consulted any other health care providers outside of the [address] [place] [address] since your last visit? Include any pap smears or colon screening.  No

## 2022-07-25 NOTE — PROGRESS NOTES
Pt in for follow up visit today per  protocol with Dr. Josseline Briggs and RN. This is the 52 week time point on study. Vital signs are stable. QOLs and assessment completed today. Labs deferred to next week (8/3/22) while the patient will be getting zometa. Next appt will be scheduled for the 104 week timepoint.

## 2022-07-25 NOTE — PROGRESS NOTES
Met with patient to discuss [facility]-[id number]:    RANDOMIZED [facility]ACE[place] CONTROLLED TRIAL OF BUPROPION   FOR CANCER RELATED FATIGUE. Patient agreed to follow up call on Monday 8/1/22 to review questions/interest in participating. If patient agrees to sign consent, will plan to do so on Wednesday 8/3/22.

## 2022-07-26 ENCOUNTER — TELEPHONE (OUTPATIENT)
Dept: ONCOLOGY | Age: 62
End: 2022-07-26

## 2022-07-26 NOTE — TELEPHONE ENCOUNTER
----- Message from Nara Stone MD sent at 7/26/2022  8:45 AM EDT -----  Please let she know this is normal

## 2022-07-26 NOTE — TELEPHONE ENCOUNTER
[address] at [facility]  [phone]    07/26/2022 at 9:11 am--This user called and spoke with the patient and let her know that her labs were normal. Patient verbalized understanding and had no further question's or concerns at that time.

## 2022-08-03 ENCOUNTER — HOSPITAL ENCOUNTER (OUTPATIENT)
Dept: INFUSION THERAPY | Age: 62
End: 2022-08-03

## 2022-08-17 ENCOUNTER — HOSPITAL ENCOUNTER (OUTPATIENT)
Dept: INFUSION THERAPY | Age: 62
End: 2022-08-17

## 2022-08-23 RX ORDER — EXEMESTANE 25 MG/1
TABLET ORAL
Qty: 30 TABLET | Refills: 0 | Status: SHIPPED | OUTPATIENT
Start: 2022-08-23 | End: 2022-09-27

## 2022-08-24 ENCOUNTER — HOSPITAL ENCOUNTER (OUTPATIENT)
Dept: INFUSION THERAPY | Age: 62
Discharge: HOME | End: 2022-08-24
Payer: COMMERCIAL

## 2022-08-24 ENCOUNTER — RESEARCH ENCOUNTER (OUTPATIENT)
Dept: ONCOLOGY | Age: 62
End: 2022-08-24

## 2022-08-24 VITALS
WEIGHT: 187.1 LBS | RESPIRATION RATE: 16 BRPM | HEIGHT: 60 IN | BODY MASS INDEX: 36.73 KG/M2 | SYSTOLIC BLOOD PRESSURE: 112 MMHG | TEMPERATURE: 97.4 F | OXYGEN SATURATION: 97 % | HEART RATE: 81 BPM | DIASTOLIC BLOOD PRESSURE: 63 MMHG

## 2022-08-24 DIAGNOSIS — M85.89 OSTEOPENIA OF MULTIPLE SITES: Primary | ICD-10-CM

## 2022-08-24 DIAGNOSIS — Z17.0 BILATERAL MALIGNANT NEOPLASM OF BREAST IN FEMALE, ESTROGEN RECEPTOR POSITIVE, UNSPECIFIED SITE OF BREAST (HCC): ICD-10-CM

## 2022-08-24 DIAGNOSIS — C50.912 BILATERAL MALIGNANT NEOPLASM OF BREAST IN FEMALE, ESTROGEN RECEPTOR POSITIVE, UNSPECIFIED SITE OF BREAST (HCC): ICD-10-CM

## 2022-08-24 DIAGNOSIS — C50.911 BILATERAL MALIGNANT NEOPLASM OF BREAST IN FEMALE, ESTROGEN RECEPTOR POSITIVE, UNSPECIFIED SITE OF BREAST (HCC): ICD-10-CM

## 2022-08-24 LAB
ANION GAP BLD CALC-SCNC: 12 MMOL/L (ref 10–20)
CA-I BLD-MCNC: 1.18 MMOL/L (ref 1.12–1.32)
CHLORIDE BLD-SCNC: 103 MMOL/L (ref 98–107)
CO2 BLD-SCNC: 25.5 MMOL/L (ref 21–32)
CREAT BLD-MCNC: 0.9 MG/DL (ref 0.6–1.3)
GLUCOSE BLD-MCNC: 91 MG/DL (ref 65–100)
POTASSIUM BLD-SCNC: 4.5 MMOL/L (ref 3.5–5.1)
SERVICE CMNT-IMP: NORMAL
SODIUM BLD-SCNC: 139 MMOL/L (ref 136–145)

## 2022-08-24 PROCEDURE — 96374 THER/PROPH/DIAG INJ IV PUSH: CPT

## 2022-08-24 PROCEDURE — 74011250636 HC RX REV CODE- 250/636: Performed by: NURSE PRACTITIONER

## 2022-08-24 PROCEDURE — 80047 BASIC METABLC PNL IONIZED CA: CPT

## 2022-08-24 RX ORDER — SODIUM CHLORIDE 9 MG/ML
5-250 INJECTION, SOLUTION INTRAVENOUS AS NEEDED
Status: DISPENSED | OUTPATIENT
Start: 2022-08-24 | End: 2022-08-24

## 2022-08-24 RX ADMIN — ZOLEDRONIC ACID 4 MG: 0.04 INJECTION, SOLUTION INTRAVENOUS at 11:58

## 2022-08-24 RX ADMIN — SODIUM CHLORIDE 25 ML/HR: 9 INJECTION, SOLUTION INTRAVENOUS at 11:54

## 2022-08-24 NOTE — PROGRESS NOTES
[facility]fusion [facility] Short Visit Progress Note    1140 Patient admitted to [facility] for Zometa ambulatory in stable condition. Assessment completed. No new concerns voiced. Peripheral IV 24 g established in right arm by Berta Art RN. Labs obtained and I-stat performed. Results are within parameters to treat. Patient denied have any recent invasive dental procedures. Patient tolerated treatment well. Patient did not want to stay post treatment observation for monitoring. Vital Signs:  Visit Vitals  /63 (BP 1 Location: Left arm, BP Patient Position: Sitting)   Pulse 81   Temp 97.4 °F (36.3 °C)   Resp 16   Ht 5' (1.524 m)   Wt 84.9 kg (187 lb 1.6 oz)   SpO2 97%   Breastfeeding No   BMI 36.54 kg/m²     Patient Vitals for the past 12 hrs:   Temp Pulse Resp BP SpO2   08/24/22 1219 97.4 °F (36.3 °C) 81 16 112/63 97 %   08/24/22 1148 98.2 °F (36.8 °C) 82 16 113/67 94 %     Lab Results:  Recent Results (from the past 12 hour(s))   POC CHEM8    Collection Time: 08/24/22 11:51 AM   Result Value Ref Range    Calcium, ionized (POC) 1.18 1.12 - 1.32 mmol/L    Sodium (POC) 139 136 - 145 mmol/L    Potassium (POC) 4.5 3.5 - 5.1 mmol/L    Chloride (POC) 103 98 - 107 mmol/L    CO2 (POC) 25.5 21 - 32 mmol/L    Anion gap (POC) 12 10 - 20 mmol/L    Glucose (POC) 91 65 - 100 mg/dL    Creatinine (POC) 0.90 0.6 - 1.3 mg/dL    GFRAA, POC >60 >60 ml/min/1.73m2    GFRNA, POC >60 >60 ml/min/1.73m2    Comment Comment Not Indicated. Medications:  Medications Administered       0.9% sodium chloride infusion       Admin Date  08/24/2022 Action  New Bag Dose  25 mL/hr Rate  25 mL/hr Route  IntraVENous Administered By  Terri Hubbard RN              zoledronic acid (ZOMETA) 4 mg/100mL infusion       Admin Date  08/24/2022 Action  New Bag Dose  4 mg Rate  400 mL/hr Route  IntraVENous Administered By  Terri Hubbard, SHAYY                    1225 Patient tolerated treatment well.  PIV taken out with no issues, pressure applied, wrapped in 2x2 gauze and band-aid. Patient discharged from [facility] ambulatory in no distress at 1225. Patient aware of next appointment.     Future Appointments   Date Time Provider Sam Gayathri   1/6/2023 11:15 AM Em Willoughby NP VBCWTC BS AMB   1/25/2023  9:00 AM SS INF7 CH2 <1H [facility]   1/25/2023  9:30 AM Ian Plunkett MD ONCSF BS AMB   5/12/2023  9:15 AM WTC [facility] 2 [facility]

## 2022-08-24 NOTE — PROGRESS NOTES
Patient's zometa appointment was rescheduled from 8/3/2022 to today, 8/24/2022. Patient refused research labs today. Will cancel for the 52 week time point on .

## 2022-09-27 RX ORDER — EXEMESTANE 25 MG/1
TABLET ORAL
Qty: 30 TABLET | Refills: 0 | Status: SHIPPED | OUTPATIENT
Start: 2022-09-27 | End: 2022-10-27

## 2022-10-27 ENCOUNTER — TELEPHONE (OUTPATIENT)
Dept: ONCOLOGY | Age: 62
End: 2022-10-27

## 2022-10-27 NOTE — TELEPHONE ENCOUNTER
[facility]  [phone]    10/27/2022--This user called and spoke with the patient and stated that she wanted the refill sent to [address] [place] [address] and would like refills this time because it never has any on there and she has to keep going back to get the medication refilled. I let her know that I would let our provider know and we would send it in.

## 2022-10-27 NOTE — TELEPHONE ENCOUNTER
Patient called and stated that she needs a prescription refill for her Aromasin medication. Patient also stated that she took her last pill yesterday. Requested a call back to speak about getting her medication on automatic order.      # [phone]

## 2022-11-03 ENCOUNTER — HOSPITAL ENCOUNTER (OUTPATIENT)
Dept: RADIATION THERAPY | Age: 62
Discharge: HOME | End: 2022-11-03

## 2022-11-21 ENCOUNTER — APPOINTMENT (OUTPATIENT)
Dept: INFUSION THERAPY | Age: 62
End: 2022-11-21

## 2023-01-06 ENCOUNTER — OFFICE VISIT (OUTPATIENT)
Dept: SURGERY | Age: 63
End: 2023-01-06
Payer: COMMERCIAL

## 2023-01-06 VITALS
WEIGHT: 187 LBS | SYSTOLIC BLOOD PRESSURE: 134 MMHG | HEART RATE: 73 BPM | BODY MASS INDEX: 36.71 KG/M2 | DIASTOLIC BLOOD PRESSURE: 82 MMHG | HEIGHT: 60 IN

## 2023-01-06 DIAGNOSIS — Z98.890 S/P LUMPECTOMY OF BREAST: ICD-10-CM

## 2023-01-06 DIAGNOSIS — Z92.3 S/P RADIATION THERAPY: ICD-10-CM

## 2023-01-06 DIAGNOSIS — Z85.3 HISTORY OF BREAST CANCER IN FEMALE: ICD-10-CM

## 2023-01-06 DIAGNOSIS — C50.912 BILATERAL MALIGNANT NEOPLASM OF BREAST IN FEMALE, ESTROGEN RECEPTOR POSITIVE, UNSPECIFIED SITE OF BREAST (HCC): Primary | ICD-10-CM

## 2023-01-06 DIAGNOSIS — C50.911 BILATERAL MALIGNANT NEOPLASM OF BREAST IN FEMALE, ESTROGEN RECEPTOR POSITIVE, UNSPECIFIED SITE OF BREAST (HCC): Primary | ICD-10-CM

## 2023-01-06 DIAGNOSIS — Z17.0 BILATERAL MALIGNANT NEOPLASM OF BREAST IN FEMALE, ESTROGEN RECEPTOR POSITIVE, UNSPECIFIED SITE OF BREAST (HCC): Primary | ICD-10-CM

## 2023-01-06 PROCEDURE — 99213 OFFICE O/P EST LOW 20 MIN: CPT | Performed by: NURSE PRACTITIONER

## 2023-01-06 NOTE — PROGRESS NOTES
HISTORY OF PRESENT ILLNESS  Guadalupe Abbott is a 58 y.o. female. HPI  Established patient presents for follow-up for BILATERAL breast cancer. Denies breast mass, worrisome skin changes, nipple discharge and pain. Breast history -   Referring - Dr. Zaynab Cardenas  5/24/21 right breast mass core bx:  IDC, 7 mm, gr 1-2, no LVI, ER + at 100%, OR + at 95%, HER 2 negative at [facility] 1+, ki67 < 5%  MRI bx, 6/16/21 breast left site A, posterior bx: ILC, gr 1, 6 mm, ER + at 99%, OR + at 60%, HER 2 negative at [facility] 1+, ki67 19%; left breast site B, anterior bx:  ILC, gr 1, 1.1 cm, ER + at 99%, OR negative, HER 2 negative ([facility] 2+; FISH ratio 1.1; sig/cell 2); ki67 18%  6/29/21 - BILATERAL lumpectomies and SLNBs - Dr. Argelia Sanchez  6/63/13 - RIGHT breast lumpectomy:  IDC, 2.5 cm, gr 1, DCIS, gr 2, focal 2 mm, 0/7 LN, pT2 pN0 cM0  6/29/21 - LEFT breast lumpectomy:  Multifocal ILC, gr 1, 1.8 cm and 1.5 cm, 0/8 LN, pT1c pN0 cM0  Mammaprint, right breast, high-risk, luminal type B  Mammaprint, left breast, site B, low-risk, luminal type A  TC 8/17/21- 10/19/21- Dr. Michelle Lantigua  11/2021 - completed XRT - Dr. Andrei Norman  1/2022 - started anastrozole - Dr. Michelle Lantigua  2/2022 - port removal  4/2022 - switched to exemestane - Dr. Michelle Lantigua        Family history -   Father with lung cancer  Paternal aunt with ovarian cancer  No breast, prostate or pancreatic cancer  9/4/21 - [employer]ae genetic testing - negative      OB History    No obstetric history on file. Obstetric Comments   Menarche:  5. LMP: 2014. # of Children:  4. Age at Delivery of First Child:  13.   Hysterectomy/oophorectomy:  NO/?.  Breast Bx:  no.  Hx of Breast Feeding:  ? [place] St[place] BCP:  ? . Hormone therapy:  no.                      Past Surgical History:   Procedure Laterality Date    HX BREAST LUMPECTOMY Bilateral 6/29/2021    LEFT BREAST LUMPECTOMY WITH BRACKETED NEEDLE LOCALIZATION, LEFT BREAST SENTINEL NODE BIOPSY, RIGHT BREAST LUMPECTOMY WITH ULTRASOUND, RIGHT BREAST SENTINEL NODE BIOPSY performed by Marcyruz Rodriguez MD at [facility]    HX ORTHOPAEDIC Left 2016    parital left knee arthroplasty    [place] ABDOMEN SURGERY PROC UNLISTED  2006    lap nissen         [facility] Results (most recent):  Results from Hospital Encounter encounter on 05/06/22    [facility] 3D LULY W MAMMO BI DX INCL CAD    Narrative  INDICATION: April 23, 2021. COMPARISON: None  . COMPOSITION the breast parenchymal pattern is scattered fibroglandular  densities. .  TECHNIQUE: Standard 2D, CC and MLO views of each breast were performed with  digital technique and subjected to computer-aided detection. Tomosynthesis of  each breast was performed in CC and MLO projections. Magnification views of the  breasts bilaterally were performed. Paulina Dumont FINDINGS:  Lumpectomy changes are seen bilaterally. No new masses or microcalcifications  are seen. .    Impression  1. BI-RADS category 2, benign. 2. The patient should return in one year for diagnostic bilateral mammography. 3. She was informed. ROS    Physical Exam  Constitutional:       Appearance: Normal appearance. Chest:   Breasts:     Right: No mass, nipple discharge, skin change or tenderness. Left: No mass, nipple discharge, skin change or tenderness. Comments: BILATERAL incisions CDI and stable post treatment changes  Musculoskeletal:      Comments: FROM - UE x 2   Lymphadenopathy:      Upper Body:      Right upper body: No supraclavicular or axillary adenopathy. Left upper body: No supraclavicular or axillary adenopathy. Neurological:      Mental Status: She is alert.    Psychiatric:         Attention and Perception: Attention normal.         Mood and Affect: Mood normal.         Speech: Speech normal.         Behavior: Behavior normal.         Visit Vitals  /82 (BP 1 Location: Right arm, BP Patient Position: Sitting, BP Cuff Size: Small adult)   Pulse 73   Ht 5' (1.524 m)   Wt 187 lb (84.8 kg)   BMI 36.52 kg/m²       ASSESSMENT and PLAN    ICD-10-CM ICD-9-CM    1. Bilateral malignant neoplasm of breast in female, estrogen receptor positive, unspecified site of breast ([facility] [address]  C50.911 174.9     Z17.0 V86.0     C50.912        2. S/P lumpectomy of breast  Z98.890 V45.89       3. S/P radiation therapy  Z92.3 V66.1       4. History of breast cancer in female  Z80.3 V10.3 [facility] 3D LULY W MAMMO BI DX INCL CAD          Normal exam with no evidence of local recurrence. Switched AI and still having significant joint pain and fatigue. Has follow-up with Dr. Teresa Vargas this month and will discuss with him. BDmammogram 3D in 5/2023. Planning to move to [facility] and will look into follow-up care in [place]. RTC in 6 months or sooner PRN. She is comfortable with this plan. All questions answered and she stated understanding. Total time spent for this patient - 20 minutes.

## 2023-01-23 ENCOUNTER — APPOINTMENT (OUTPATIENT)
Dept: INFUSION THERAPY | Age: 63
End: 2023-01-23

## 2023-01-25 ENCOUNTER — HOSPITAL ENCOUNTER (OUTPATIENT)
Dept: INFUSION THERAPY | Age: 63
End: 2023-01-25

## 2023-02-01 RX ORDER — ONDANSETRON 2 MG/ML
8 INJECTION INTRAMUSCULAR; INTRAVENOUS AS NEEDED
Status: CANCELLED | OUTPATIENT
Start: 2023-02-08

## 2023-02-01 RX ORDER — DIPHENHYDRAMINE HYDROCHLORIDE 50 MG/ML
50 INJECTION, SOLUTION INTRAMUSCULAR; INTRAVENOUS AS NEEDED
Status: CANCELLED
Start: 2023-02-08

## 2023-02-01 RX ORDER — HEPARIN 100 UNIT/ML
500 SYRINGE INTRAVENOUS AS NEEDED
Status: CANCELLED
Start: 2023-02-08

## 2023-02-01 RX ORDER — HYDROCORTISONE SODIUM SUCCINATE 100 MG/2ML
100 INJECTION, POWDER, FOR SOLUTION INTRAMUSCULAR; INTRAVENOUS AS NEEDED
Status: CANCELLED | OUTPATIENT
Start: 2023-02-08

## 2023-02-01 RX ORDER — DIPHENHYDRAMINE HYDROCHLORIDE 50 MG/ML
25 INJECTION, SOLUTION INTRAMUSCULAR; INTRAVENOUS AS NEEDED
Status: CANCELLED
Start: 2023-02-08

## 2023-02-01 RX ORDER — SODIUM CHLORIDE 0.9 % (FLUSH) 0.9 %
5-40 SYRINGE (ML) INJECTION AS NEEDED
Status: CANCELLED | OUTPATIENT
Start: 2023-02-08

## 2023-02-01 RX ORDER — EPINEPHRINE 1 MG/ML
0.3 INJECTION, SOLUTION, CONCENTRATE INTRAVENOUS AS NEEDED
Status: CANCELLED | OUTPATIENT
Start: 2023-02-08

## 2023-02-01 RX ORDER — ALBUTEROL SULFATE 0.83 MG/ML
2.5 SOLUTION RESPIRATORY (INHALATION) AS NEEDED
Status: CANCELLED
Start: 2023-02-08

## 2023-02-01 RX ORDER — ACETAMINOPHEN 325 MG/1
650 TABLET ORAL AS NEEDED
Status: CANCELLED
Start: 2023-02-08

## 2023-02-08 ENCOUNTER — OFFICE VISIT (OUTPATIENT)
Dept: ONCOLOGY | Age: 63
End: 2023-02-08
Payer: COMMERCIAL

## 2023-02-08 ENCOUNTER — HOSPITAL ENCOUNTER (OUTPATIENT)
Dept: INFUSION THERAPY | Age: 63
Discharge: HOME | End: 2023-02-08
Payer: COMMERCIAL

## 2023-02-08 VITALS
HEIGHT: 60 IN | SYSTOLIC BLOOD PRESSURE: 121 MMHG | TEMPERATURE: 97.9 F | BODY MASS INDEX: 35.53 KG/M2 | WEIGHT: 181 LBS | RESPIRATION RATE: 16 BRPM | OXYGEN SATURATION: 97 % | HEART RATE: 83 BPM | DIASTOLIC BLOOD PRESSURE: 78 MMHG

## 2023-02-08 VITALS
WEIGHT: 181 LBS | HEIGHT: 60 IN | HEART RATE: 83 BPM | TEMPERATURE: 97.9 F | OXYGEN SATURATION: 97 % | RESPIRATION RATE: 16 BRPM | BODY MASS INDEX: 35.53 KG/M2 | SYSTOLIC BLOOD PRESSURE: 121 MMHG | DIASTOLIC BLOOD PRESSURE: 78 MMHG

## 2023-02-08 DIAGNOSIS — Z78.0 POSTMENOPAUSAL: ICD-10-CM

## 2023-02-08 DIAGNOSIS — C50.911 BILATERAL MALIGNANT NEOPLASM OF BREAST IN FEMALE, ESTROGEN RECEPTOR POSITIVE, UNSPECIFIED SITE OF BREAST (HCC): ICD-10-CM

## 2023-02-08 DIAGNOSIS — C50.911 BILATERAL MALIGNANT NEOPLASM OF BREAST IN FEMALE, ESTROGEN RECEPTOR POSITIVE, UNSPECIFIED SITE OF BREAST (HCC): Primary | ICD-10-CM

## 2023-02-08 DIAGNOSIS — C50.912 BILATERAL MALIGNANT NEOPLASM OF BREAST IN FEMALE, ESTROGEN RECEPTOR POSITIVE, UNSPECIFIED SITE OF BREAST (HCC): Primary | ICD-10-CM

## 2023-02-08 DIAGNOSIS — Z17.0 BILATERAL MALIGNANT NEOPLASM OF BREAST IN FEMALE, ESTROGEN RECEPTOR POSITIVE, UNSPECIFIED SITE OF BREAST (HCC): Primary | ICD-10-CM

## 2023-02-08 DIAGNOSIS — M85.89 OSTEOPENIA OF MULTIPLE SITES: Primary | ICD-10-CM

## 2023-02-08 DIAGNOSIS — C50.912 BILATERAL MALIGNANT NEOPLASM OF BREAST IN FEMALE, ESTROGEN RECEPTOR POSITIVE, UNSPECIFIED SITE OF BREAST (HCC): ICD-10-CM

## 2023-02-08 DIAGNOSIS — Z17.0 BILATERAL MALIGNANT NEOPLASM OF BREAST IN FEMALE, ESTROGEN RECEPTOR POSITIVE, UNSPECIFIED SITE OF BREAST (HCC): ICD-10-CM

## 2023-02-08 LAB
ANION GAP BLD CALC-SCNC: 13 MMOL/L (ref 10–20)
CA-I BLD-MCNC: 1.19 MMOL/L (ref 1.12–1.32)
CHLORIDE BLD-SCNC: 105 MMOL/L (ref 98–107)
CO2 BLD-SCNC: 25.6 MMOL/L (ref 21–32)
CREAT BLD-MCNC: 0.73 MG/DL (ref 0.6–1.3)
GLUCOSE BLD-MCNC: 115 MG/DL (ref 65–100)
POTASSIUM BLD-SCNC: 4.8 MMOL/L (ref 3.5–5.1)
SERVICE CMNT-IMP: ABNORMAL
SODIUM BLD-SCNC: 143 MMOL/L (ref 136–145)

## 2023-02-08 PROCEDURE — 96374 THER/PROPH/DIAG INJ IV PUSH: CPT

## 2023-02-08 PROCEDURE — 80047 BASIC METABLC PNL IONIZED CA: CPT

## 2023-02-08 PROCEDURE — 74011250636 HC RX REV CODE- 250/636: Performed by: INTERNAL MEDICINE

## 2023-02-08 PROCEDURE — 99214 OFFICE O/P EST MOD 30 MIN: CPT | Performed by: INTERNAL MEDICINE

## 2023-02-08 RX ORDER — DIPHENHYDRAMINE HYDROCHLORIDE 50 MG/ML
25 INJECTION, SOLUTION INTRAMUSCULAR; INTRAVENOUS AS NEEDED
Start: 2023-07-26

## 2023-02-08 RX ORDER — LETROZOLE 2.5 MG/1
2.5 TABLET, FILM COATED ORAL DAILY
Qty: 90 TABLET | Refills: 2 | Status: SHIPPED | OUTPATIENT
Start: 2023-02-08

## 2023-02-08 RX ORDER — EPINEPHRINE 1 MG/ML
0.3 INJECTION, SOLUTION, CONCENTRATE INTRAVENOUS AS NEEDED
OUTPATIENT
Start: 2023-07-26

## 2023-02-08 RX ORDER — SODIUM CHLORIDE 9 MG/ML
5-250 INJECTION, SOLUTION INTRAVENOUS AS NEEDED
Status: DISPENSED | OUTPATIENT
Start: 2023-02-08 | End: 2023-02-08

## 2023-02-08 RX ORDER — SODIUM CHLORIDE 9 MG/ML
5-250 INJECTION, SOLUTION INTRAVENOUS AS NEEDED
OUTPATIENT
Start: 2023-07-26

## 2023-02-08 RX ORDER — ALBUTEROL SULFATE 0.83 MG/ML
2.5 SOLUTION RESPIRATORY (INHALATION) AS NEEDED
Start: 2023-07-26

## 2023-02-08 RX ORDER — CYCLOBENZAPRINE HCL 10 MG
TABLET ORAL
COMMUNITY
Start: 2022-12-14

## 2023-02-08 RX ORDER — HEPARIN 100 UNIT/ML
500 SYRINGE INTRAVENOUS AS NEEDED
Start: 2023-07-26

## 2023-02-08 RX ORDER — ONDANSETRON 2 MG/ML
8 INJECTION INTRAMUSCULAR; INTRAVENOUS AS NEEDED
OUTPATIENT
Start: 2023-07-26

## 2023-02-08 RX ORDER — SODIUM CHLORIDE 9 MG/ML
5-40 INJECTION INTRAVENOUS AS NEEDED
Status: ACTIVE | OUTPATIENT
Start: 2023-02-08 | End: 2023-02-08

## 2023-02-08 RX ORDER — SODIUM CHLORIDE 0.9 % (FLUSH) 0.9 %
5-40 SYRINGE (ML) INJECTION AS NEEDED
OUTPATIENT
Start: 2023-07-26

## 2023-02-08 RX ORDER — ACETAMINOPHEN 325 MG/1
650 TABLET ORAL AS NEEDED
Start: 2023-07-26

## 2023-02-08 RX ORDER — DIPHENHYDRAMINE HYDROCHLORIDE 50 MG/ML
50 INJECTION, SOLUTION INTRAMUSCULAR; INTRAVENOUS AS NEEDED
Start: 2023-07-26

## 2023-02-08 RX ORDER — SODIUM CHLORIDE 9 MG/ML
5-40 INJECTION INTRAVENOUS AS NEEDED
OUTPATIENT
Start: 2023-07-26

## 2023-02-08 RX ORDER — HYDROCORTISONE SODIUM SUCCINATE 100 MG/2ML
100 INJECTION, POWDER, FOR SOLUTION INTRAMUSCULAR; INTRAVENOUS AS NEEDED
OUTPATIENT
Start: 2023-07-26

## 2023-02-08 RX ADMIN — SODIUM CHLORIDE 25 ML/HR: 9 INJECTION, SOLUTION INTRAVENOUS at 11:32

## 2023-02-08 RX ADMIN — ZOLEDRONIC ACID 4 MG: 0.04 INJECTION, SOLUTION INTRAVENOUS at 11:32

## 2023-02-08 NOTE — PROGRESS NOTES
Chief Complaint   Patient presents with    Chemotherapy       Vitals:    02/08/23 0915   BP: 121/78   Pulse: 83   Resp: 16   Temp: 97.9 °F (36.6 °C)   TempSrc: Temporal   SpO2: 97%   Weight: 181 lb (82.1 kg)   Height: 5' (1.524 m)   PainSc:   0 - No pain       Current Outpatient Medications on File Prior to Visit   Medication Sig Dispense Refill    cyclobenzaprine (FLEXERIL) 10 mg tablet TAKE 1 TABLET BY MOUTH ONCE DAILY AT BEDTIME AS NEEDED      exemestane (AROMASIN) 25 mg tablet Take 1 tablet by mouth once daily 90 Tablet 3    OTHER 6,000 mcg. HAIR, SKIN AND NAIL GUMMIES. rosuvastatin (CRESTOR) 20 mg tablet Take 20 mg by mouth daily. cholecalciferol, vitamin D3, 50 mcg (2,000 unit) tab Take 1 Tablet by mouth daily. omeprazole (PRILOSEC) 20 mg capsule Take 20 mg by mouth daily. celecoxib (CELEBREX) 200 mg capsule TAKE 1 CAPSULE BY MOUTH ONCE DAILY WITH FOOD FOR 30 DAYS      lisinopril-hydroCHLOROthiazide (PRINZIDE, ZESTORETIC) 20-25 mg per tablet Take 1 Tablet by mouth daily. traMADoL (ULTRAM) 50 mg tablet Take 50 mg by mouth every six (6) hours as needed for Pain.      prochlorperazine (COMPAZINE) 10 mg tablet Take 1 Tablet by mouth every six (6) hours as needed for Nausea. (Patient not taking: Reported on 2/8/2023) 50 Tablet 5     Current Facility-Administered Medications on File Prior to Visit   Medication Dose Route Frequency Provider Last Rate Last Admin    0.9% sodium chloride infusion  5-250 mL/hr IntraVENous PRN Peri Bailon MD        zoledronic acid (ZOMETA) 4 mg/100mL infusion  4 mg IntraVENous ONCE Peri Bailon MD        0.9% sodium chloride injection 5-40 mL  5-40 mL IntraVENous PRN Peri Bailon MD        0.9% sodium chloride infusion  5-250 mL/hr IntraVENous PRALEXANDER Bailon MD           No Known Allergies        1. Have you been to the ER, urgent care clinic since your last visit? Hospitalized since your last visit? No    2.  Have you seen or consulted any other health care providers outside of the [address] [place] [address] since your last visit? Include any pap smears or colon screening.  No

## 2023-02-08 NOTE — PROGRESS NOTES
[facility] [place] [address]  [address] [facility], [address] [facility] Manners: [phone]  F: [phone]      Reason for Visit:   Ynes Stephen is a 58 y.o. female who is seen in follow up for breast cancer    Rad onc:  Dr. Lele Armendariz  Breast Surgeon: Dr. Iwona Chanel    Treatment History:   5/24/21 right breast mass core bx:  IDC, 7 mm, gr 1-2, no LVI, ER + at 100%, DC + at 95%, HER 2 negative at [facility] 1+, ki67 < 5%  MRI bx, 6/16/21 breast left site A, posterior bx: ILC, gr 1, 6 mm, ER + at 99%, DC + at 60%, HER 2 negative at IHC 1+, ki67 19%; left breast site B, anterior bx:  ILC, gr 1, 1.1 cm, ER + at 99%, DC negative, HER 2 negative (IHC 2+; FISH ratio 1.1 ; sig/cell 2); ki67 18%  6/29/21 right breast lumpectomy:  IDC, 2.5 cm, gr 1, DCIS, gr 2, focal 2 mm, 0/7 LN, pT2 pN0 cM0; left breast lumpectomy:  Multifocal ILC, gr 1, 1.8 cm and 1.5 cm, 0/8 LN, pT1c pN0 cM0  Mammaprint, right breast, high-risk, luminal type B  Mammaprint, left breast, site B, low-risk, luminal type A  9/4/21 invitae genetic testing, negative  TC 8/17/21- 10/19/21  XRT completed 11/30/21  Anastrozole 12/7/21 - 3/15/2022 (joint pain)   Exemestane 4/18/22- 2/8/23 (hand pain)  Letrozole (plan on starting 2/22/23)    History of Present Illness: An abnormal right mammogram led to the pathology above    Interval history: Patient presents today for follow up on exemestane. Reports gr 2 fatigue, gr 1 hot flashes, gr 1 insomnia, gr 1 pain/cramping rated 4/10 described as generalized to body/hands.      FH:  Father with lung cancer; no breast; paternal aunt with ovarian cancer, no prostate or pancreas cancer    Past Medical History:   Diagnosis Date    Asthma     Breast cancer ([facility] [address] 2021    bi lateral breast cancers    Cancer ([facility] [address] 06/2021    bilateral breast cancer    COVID-19 02/2021    GERD (gastroesophageal reflux disease)     History of chemotherapy 2021    Hyperlipemia     Hypertension     Radiation therapy complication 6076    bi lat      Past Surgical History:   Procedure Laterality Date    HX BREAST LUMPECTOMY Bilateral 6/29/2021    LEFT BREAST LUMPECTOMY WITH BRACKETED NEEDLE LOCALIZATION, LEFT BREAST SENTINEL NODE BIOPSY, RIGHT BREAST LUMPECTOMY WITH ULTRASOUND, RIGHT BREAST SENTINEL NODE BIOPSY performed by Bryan Cook MD at [address] [facility]    HX ORTHOPAEDIC Left 2016    parital left knee arthroplasty    IA UNLISTED PROCEDURE ABDOMEN PERITONEUM & OMENTUM  2006    lap nissen      Social History     Tobacco Use    Smoking status: Never    Smokeless tobacco: Never   Substance Use Topics    Alcohol use: Not Currently     Comment:  2 drinks per year      Family History   Problem Relation Age of Onset    Lung Cancer Father      Current Outpatient Medications   Medication Sig    cyclobenzaprine (FLEXERIL) 10 mg tablet TAKE 1 TABLET BY MOUTH ONCE DAILY AT BEDTIME AS NEEDED    letrozole (FEMARA) 2.5 mg tablet Take 1 Tablet by mouth daily. OTHER 6,000 mcg. HAIR, SKIN AND NAIL GUMMIES. rosuvastatin (CRESTOR) 20 mg tablet Take 20 mg by mouth daily. cholecalciferol, vitamin D3, 50 mcg (2,000 unit) tab Take 1 Tablet by mouth daily. omeprazole (PRILOSEC) 20 mg capsule Take 20 mg by mouth daily. celecoxib (CELEBREX) 200 mg capsule TAKE 1 CAPSULE BY MOUTH ONCE DAILY WITH FOOD FOR 30 DAYS    lisinopril-hydroCHLOROthiazide (PRINZIDE, ZESTORETIC) 20-25 mg per tablet Take 1 Tablet by mouth daily. traMADoL (ULTRAM) 50 mg tablet Take 50 mg by mouth every six (6) hours as needed for Pain.    prochlorperazine (COMPAZINE) 10 mg tablet Take 1 Tablet by mouth every six (6) hours as needed for Nausea. (Patient not taking: Reported on 2/8/2023)     No current facility-administered medications for this visit.      Facility-Administered Medications Ordered in Other Visits   Medication Dose Route Frequency    0.9% sodium chloride infusion  5-250 mL/hr IntraVENous PRN    zoledronic acid (ZOMETA) 4 mg/100mL infusion 4 mg IntraVENous ONCE    0.9% sodium chloride injection 5-40 mL  5-40 mL IntraVENous PRN    0.9% sodium chloride infusion  5-250 mL/hr IntraVENous PRN      No Known Allergies     Review of Systems: A complete review of systems was obtained, negative except as described above. Physical Exam:     Visit Vitals  /78 (BP 1 Location: Right upper arm, BP Patient Position: Sitting, BP Cuff Size: Adult)   Pulse 83   Temp 97.9 °F (36.6 °C) (Temporal)   Resp 16   Ht 5' (1.524 m)   Wt 181 lb (82.1 kg)   SpO2 97%   BMI 35.35 kg/m²     ECOG PS: 0    Constitutional: Appears well-developed and well-nourished in no apparent distress      Mental status: Alert and awake, Oriented to person/place/time, Able to follow commands    Eyes: EOM normal, Sclera normal, No visible ocular discharge    HENT: Normocephalic, atraumatic    Mouth/Throat: Moist mucous membranes    External Ears normal    Neck: No visualized mass    Pulmonary/Chest: Respiratory effort normal, No visualized signs of difficulty breathing or respiratory distress    Musculoskeletal: Normal gait with no signs of ataxia, Normal range of motion of neck    Neurological: No facial asymmetry (Cranial nerve 7 motor function), No gaze palsy    Skin: No significant exanthematous lesions or discoloration noted on facial skin    Psychiatric: Normal affect, No hallucinations       Results:     Lab Results   Component Value Date/Time    WBC 7.3 07/25/2022 12:59 PM    HGB 14.2 07/25/2022 12:59 PM    HCT 42.6 07/25/2022 12:59 PM    PLATELET 868 61/38/6716 12:59 PM    MCV 90.6 07/25/2022 12:59 PM    ABS.  NEUTROPHILS 4.9 07/25/2022 12:59 PM     Lab Results   Component Value Date/Time    Sodium 138 10/27/2021 08:07 AM    Potassium 3.7 10/27/2021 08:07 AM    Chloride 104 10/27/2021 08:07 AM    CO2 29 10/27/2021 08:07 AM    Glucose 81 10/27/2021 08:07 AM    BUN 13 10/27/2021 08:07 AM    Creatinine 0.68 10/27/2021 08:07 AM    GFR est AA >60 10/27/2021 08:07 AM    GFR est non-AA >60 10/27/2021 08:07 AM    Calcium 9.4 10/27/2021 08:07 AM    Sodium (POC) 143 02/08/2023 09:27 AM    Potassium (POC) 4.8 02/08/2023 09:27 AM    Chloride (POC) 105 02/08/2023 09:27 AM    Glucose (POC) 115 (H) 02/08/2023 09:27 AM    Creatinine (POC) 0.73 02/08/2023 09:27 AM    Calcium, ionized (POC) 1.19 02/08/2023 09:27 AM     Lab Results   Component Value Date/Time    Bilirubin, total 0.4 10/27/2021 08:07 AM    ALT (SGPT) 23 10/27/2021 08:07 AM    Alk. phosphatase 75 10/27/2021 08:07 AM    Protein, total 6.7 10/27/2021 08:07 AM    Albumin 3.5 10/27/2021 08:07 AM    Globulin 3.2 10/27/2021 08:07 AM     6/8/21 MRI breast  FINDINGS:      Background enhancement: Minimal.     Right Breast: At about the 8:00 position and middle depth of the right breast  there is a 5 x 6 x 8 mm enhancing nodule adjacent to the posterior aspect of  HydroMark clip signal void. There are no other enhancing lesions to suggest any  additional sites of invasive breast carcinoma. Left Breast: There is a 7 x 9 x 11 mm mass with washout kinetics in the 12:00  position of the central left breast near the junction of the posterior middle  thirds. Slightly superior to this at 12:00 at approximately the junction of the  anterior middle thirds there is a 6 x 9 x 9 mm enhancing focus with some minimal  washout kinetics. There are no other spacious enhancing lesions identified. Lymph: Unremarkable. Chest wall and visualized abdomen: Unremarkable. IMPRESSION  Known right breast carcinoma with no additional right breast lesions  identified. There are two suspicious enhancing masses left breast.     Right Breast:  BI-RADS Assessment Category 6: Known biopsy proven malignancy-  Appropriate action should be taken. Left Breast:   BI-RADS Assessment Category 4: Suspicious abnormality - Biopsy  should be performed in the absence of clinical contraindication. RECOMMENDATION: MRI guided left breast biopsy of two lesions.     Records reviewed and summarized above. Pathology report(s) reviewed above. Radiology report(s) reviewed above. Assessment/plan:   1. Right breast LOQ IDC, 2.5 cm, 0/7 LN, gr 2, ER +, NJ +, HER 2 negative, stage IIA, prognostic stage IA  High risk mammaprint    We explained to the patient that the goal of systemic adjuvant therapy is to improve the chances for cure and decrease the risk of relapse. We explained why a patient can have microscopic cancer spread now even though physical examination, laboratory studies and imaging studies are negative for cancer. We explained that the same treatments used now as adjuvant or preventive treatments rarely if ever are curative in women who develop metastases. She is agreeable to anastrozole. Started this 12/7/22. Tolerated poorly with joint/body aches. Stopped 3/2022. Switched to Exemestane 25 mg daily 4/2022 and was tolerating well but now worsened. Will HOLD for 2 weeks and try letrozole 2.5mg daily. Mammogram in May 2023. She is moving in August to [place], will transfer care to [facility] in [place], [place]. Will place referral to transfer care. 2. Left breast central ILC, gr 1, multifocal, 1.8 cm and 1.5 cm, ER +, NJ +, HER 2 negative and ER +, NJ negative, HER 2 negative, 0/8 LN involved, stage IA both anatomic and prognostic  Low risk mammaprint    See treatment discussion above for higher risk disease    3. Emotional well being:  She has excellent support and is coping well with her disease    4. Genetic testing:   discussed potential ramifications of a positive test including the potential need for bilateral mastectomies and bilateral oophorectomies and the risk then for her family members to also have a mutation. VUS also discussed. invitae testing negative    5. Fatigue: fatigue continues and worsened in the last two weeks. Discussed NCI wellbutrin study, she rates her fatigue 4/10. TSH and CBC normal. She declined fatigue study. Stable today. 6. Rapid Heart Rate: patient reports flutter to chest on occasion she feels is due to anxiety. Denies concurrent chest pain or dyspnea. EKG not performed. This continues and she has seen PCP for management. Discussed option of EKG for evaluation and cardiology evaluation, but due to stability and no associated symptoms she prefers to hold off. NO recurrence or this since last visit. 7. Osteopenia: per DEXA on 4/23/21. She is taking 2000 international units of vitamin D3 and 2-3 servings of dietary calcium. We discussed the data from [id number] [place] [address], [address] 2013, for the meta-analysis for adjuvant bisphosphonate use in breast cancer. We discussed that in postmenopausal women, it appears that the bisphosphate zolendronic acid, given as in ABCSG 12 at 4 mg IV q 6 months x 3 years, is beneficial in improving bone DFS and OS. We discussed side effects such as ONJ and the need to avoid invasive dental procedures while on these medications, renal damage, and hypocalcemia. She is agreeable to this. #1 8/24/22, #2 2/8/23, next due 7/26/23. 8. Joint Pain: most likely due to AI and bothersome. Recommended tart cherry juice and omgea 3 FA 3g/day, she did not take. Improved on exemestane but now worsened to bilateral hands making work difficult. Will switch to letrozole. 9. Vertigo/ringing in ears: this has been present for many years but worsened. She saw ENT and they recommend hearing aides. I personally saw and evaluated the patient and performed the entire medical decision making. The history, physical exam, and documentation were performed by Whitney Hall NP. I reviewed and verified the above documentation and modified it as needed. Right breast cancer, ER +, on exemestane, having worsening hand pain, will stop for 2 weeks and change to letrozole, rx in. Discussed taking tart cherry juice and omega 3 FA to see if that improves sx. Zometa for osteopenia, next in July.   Moving to [place] for family in August.  Mammogram in may. Rtc in 3 months. Dexa ordered for april        Signed By: Jeannie Green MD      No orders of the defined types were placed in this encounter.

## 2023-02-08 NOTE — PROGRESS NOTES
[facility]patient [facility]fusion [facility]   Visit Progress Note    Patient admitted to [facility] for  Zometa in stable condition. Assessment completed. No new concerns voiced. PT denies any recent hospital stays. PT denies any recent dental work and states no dental work planned. VS  Patient Vitals for the past 24 hrs:   Temp Pulse Resp BP SpO2   02/08/23 0915 97.9 °F (36.6 °C) 83 16 121/78 97 %        PIV placed with positive blood return, POC chem8 drawn per orders. Pt proceeded to appt with Dr. Baudilio Austin. Medications:  Medications Administered       0.9% sodium chloride infusion       Admin Date  02/08/2023 Action  New Bag Dose  25 mL/hr Rate  25 mL/hr Route  IntraVENous Administered By  Charissa Mendoza, SHAYY              zoledronic acid (ZOMETA) 4 mg/100mL infusion       Admin Date  02/08/2023 Action  New Bag Dose  4 mg Rate  400 mL/hr Route  IntraVENous Administered By  Charissa Mendoza, SHAYY                     Patient tolerated treatment well. Patient discharged from [place] [address] in no distress. Patient aware of next appointment. Labs  Recent Results (from the past 24 hour(s))   POC CHEM8    Collection Time: 02/08/23  9:27 AM   Result Value Ref Range    Calcium, ionized (POC) 1.19 1.12 - 1.32 mmol/L    Sodium (POC) 143 136 - 145 mmol/L    Potassium (POC) 4.8 3.5 - 5.1 mmol/L    Chloride (POC) 105 98 - 107 mmol/L    CO2 (POC) 25.6 21 - 32 mmol/L    Anion gap (POC) 13 10 - 20 mmol/L    Glucose (POC) 115 (H) 65 - 100 mg/dL    Creatinine (POC) 0.73 0.6 - 1.3 mg/dL    eGFR (POC) >60 >60 ml/min/1.73m2    Comment Comment Not Indicated.

## 2023-05-11 DIAGNOSIS — Z85.3 HISTORY OF BREAST CANCER: Primary | ICD-10-CM

## 2023-05-12 ENCOUNTER — HOSPITAL ENCOUNTER (OUTPATIENT)
Facility: HOSPITAL | Age: 63
Discharge: HOME | End: 2023-05-12
Payer: COMMERCIAL

## 2023-05-12 DIAGNOSIS — Z85.3 HISTORY OF BREAST CANCER: ICD-10-CM

## 2023-05-12 PROCEDURE — 77066 DX MAMMO INCL CAD BI: CPT

## 2023-07-19 DIAGNOSIS — C50.912 BILATERAL MALIGNANT NEOPLASM OF BREAST IN FEMALE, ESTROGEN RECEPTOR POSITIVE, UNSPECIFIED SITE OF BREAST (HCC): ICD-10-CM

## 2023-07-19 DIAGNOSIS — C50.911 BILATERAL MALIGNANT NEOPLASM OF BREAST IN FEMALE, ESTROGEN RECEPTOR POSITIVE, UNSPECIFIED SITE OF BREAST (HCC): ICD-10-CM

## 2023-07-19 DIAGNOSIS — M85.89 OSTEOPENIA OF MULTIPLE SITES: Primary | ICD-10-CM

## 2023-07-19 DIAGNOSIS — Z17.0 BILATERAL MALIGNANT NEOPLASM OF BREAST IN FEMALE, ESTROGEN RECEPTOR POSITIVE, UNSPECIFIED SITE OF BREAST (HCC): ICD-10-CM

## 2023-07-19 RX ORDER — SODIUM CHLORIDE 9 MG/ML
5-250 INJECTION, SOLUTION INTRAVENOUS PRN
OUTPATIENT
Start: 2023-07-26

## 2023-07-19 RX ORDER — HEPARIN SODIUM 100 [USP'U]/ML
500 INJECTION, SOLUTION INTRAVENOUS PRN
OUTPATIENT
Start: 2023-07-26

## 2023-07-19 RX ORDER — ZOLEDRONIC ACID 0.04 MG/ML
4 INJECTION, SOLUTION INTRAVENOUS ONCE
OUTPATIENT
Start: 2023-07-26 | End: 2023-07-26

## 2023-07-19 RX ORDER — ONDANSETRON 2 MG/ML
8 INJECTION INTRAMUSCULAR; INTRAVENOUS
OUTPATIENT
Start: 2023-07-26

## 2023-07-19 RX ORDER — ALBUTEROL SULFATE 90 UG/1
4 AEROSOL, METERED RESPIRATORY (INHALATION) PRN
OUTPATIENT
Start: 2023-07-26

## 2023-07-19 RX ORDER — SODIUM CHLORIDE 0.9 % (FLUSH) 0.9 %
5-40 SYRINGE (ML) INJECTION PRN
OUTPATIENT
Start: 2023-07-26

## 2023-07-19 RX ORDER — SODIUM CHLORIDE 9 MG/ML
INJECTION, SOLUTION INTRAVENOUS CONTINUOUS
OUTPATIENT
Start: 2023-07-26

## 2023-07-19 RX ORDER — ACETAMINOPHEN 325 MG/1
650 TABLET ORAL
OUTPATIENT
Start: 2023-07-26

## 2023-07-19 RX ORDER — DIPHENHYDRAMINE HYDROCHLORIDE 50 MG/ML
50 INJECTION INTRAMUSCULAR; INTRAVENOUS
OUTPATIENT
Start: 2023-07-26

## 2023-07-19 RX ORDER — EPINEPHRINE 1 MG/ML
0.3 INJECTION, SOLUTION, CONCENTRATE INTRAVENOUS PRN
OUTPATIENT
Start: 2023-07-26

## 2023-07-25 ENCOUNTER — TELEPHONE (OUTPATIENT)
Age: 63
End: 2023-07-25

## 2023-07-26 ENCOUNTER — APPOINTMENT (OUTPATIENT)
Dept: INFUSION THERAPY | Age: 63
End: 2023-07-26

## 2023-07-26 ENCOUNTER — OFFICE VISIT (OUTPATIENT)
Age: 63
End: 2023-07-26
Payer: COMMERCIAL

## 2023-07-26 ENCOUNTER — RESEARCH ENCOUNTER (OUTPATIENT)
Age: 63
End: 2023-07-26

## 2023-07-26 ENCOUNTER — HOSPITAL ENCOUNTER (OUTPATIENT)
Facility: HOSPITAL | Age: 63
Setting detail: INFUSION SERIES
End: 2023-07-26
Payer: COMMERCIAL

## 2023-07-26 VITALS
SYSTOLIC BLOOD PRESSURE: 104 MMHG | OXYGEN SATURATION: 99 % | RESPIRATION RATE: 17 BRPM | BODY MASS INDEX: 36.13 KG/M2 | WEIGHT: 185 LBS | DIASTOLIC BLOOD PRESSURE: 71 MMHG | HEART RATE: 71 BPM | TEMPERATURE: 98.2 F

## 2023-07-26 VITALS
HEART RATE: 79 BPM | TEMPERATURE: 98.2 F | DIASTOLIC BLOOD PRESSURE: 65 MMHG | BODY MASS INDEX: 36.5 KG/M2 | SYSTOLIC BLOOD PRESSURE: 108 MMHG | HEIGHT: 60 IN | OXYGEN SATURATION: 100 % | RESPIRATION RATE: 18 BRPM | WEIGHT: 185.9 LBS

## 2023-07-26 DIAGNOSIS — R53.83 OTHER FATIGUE: ICD-10-CM

## 2023-07-26 DIAGNOSIS — Z17.0 ESTROGEN RECEPTOR POSITIVE STATUS (ER+): ICD-10-CM

## 2023-07-26 DIAGNOSIS — C50.911 BILATERAL MALIGNANT NEOPLASM OF BREAST IN FEMALE, ESTROGEN RECEPTOR POSITIVE, UNSPECIFIED SITE OF BREAST (HCC): ICD-10-CM

## 2023-07-26 DIAGNOSIS — Z17.0 MALIGNANT NEOPLASM OF OVERLAPPING SITES OF RIGHT BREAST IN FEMALE, ESTROGEN RECEPTOR POSITIVE (HCC): Primary | ICD-10-CM

## 2023-07-26 DIAGNOSIS — M85.89 OSTEOPENIA OF MULTIPLE SITES: Primary | ICD-10-CM

## 2023-07-26 DIAGNOSIS — C50.912 BILATERAL MALIGNANT NEOPLASM OF BREAST IN FEMALE, ESTROGEN RECEPTOR POSITIVE, UNSPECIFIED SITE OF BREAST (HCC): ICD-10-CM

## 2023-07-26 DIAGNOSIS — C50.811 MALIGNANT NEOPLASM OF OVERLAPPING SITES OF RIGHT BREAST IN FEMALE, ESTROGEN RECEPTOR POSITIVE (HCC): Primary | ICD-10-CM

## 2023-07-26 DIAGNOSIS — Z17.0 BILATERAL MALIGNANT NEOPLASM OF BREAST IN FEMALE, ESTROGEN RECEPTOR POSITIVE, UNSPECIFIED SITE OF BREAST (HCC): ICD-10-CM

## 2023-07-26 LAB
ANION GAP BLD CALC-SCNC: 7 MMOL/L (ref 10–20)
CA-I BLD-MCNC: 1.28 MMOL/L (ref 1.12–1.32)
CHLORIDE BLD-SCNC: 108 MMOL/L (ref 98–107)
CO2 BLD-SCNC: 30.2 MMOL/L (ref 21–32)
CREAT BLD-MCNC: 0.5 MG/DL (ref 0.6–1.3)
GLUCOSE BLD-MCNC: 99 MG/DL (ref 65–100)
POTASSIUM BLD-SCNC: 4.5 MMOL/L (ref 3.5–5.1)
SERVICE CMNT-IMP: ABNORMAL
SODIUM BLD-SCNC: 144 MMOL/L (ref 136–145)

## 2023-07-26 PROCEDURE — 80047 BASIC METABLC PNL IONIZED CA: CPT

## 2023-07-26 PROCEDURE — 2580000003 HC RX 258: Performed by: INTERNAL MEDICINE

## 2023-07-26 PROCEDURE — 96365 THER/PROPH/DIAG IV INF INIT: CPT

## 2023-07-26 PROCEDURE — 6360000002 HC RX W HCPCS: Performed by: INTERNAL MEDICINE

## 2023-07-26 PROCEDURE — 99214 OFFICE O/P EST MOD 30 MIN: CPT | Performed by: INTERNAL MEDICINE

## 2023-07-26 RX ORDER — EPINEPHRINE 1 MG/ML
0.3 INJECTION, SOLUTION, CONCENTRATE INTRAVENOUS PRN
OUTPATIENT
Start: 2024-01-24

## 2023-07-26 RX ORDER — LETROZOLE 2.5 MG/1
2.5 TABLET, FILM COATED ORAL DAILY
Qty: 90 TABLET | Refills: 3 | Status: SHIPPED | OUTPATIENT
Start: 2023-07-26

## 2023-07-26 RX ORDER — SODIUM CHLORIDE 9 MG/ML
5-250 INJECTION, SOLUTION INTRAVENOUS PRN
OUTPATIENT
Start: 2024-01-24

## 2023-07-26 RX ORDER — DIPHENHYDRAMINE HYDROCHLORIDE 50 MG/ML
50 INJECTION INTRAMUSCULAR; INTRAVENOUS
OUTPATIENT
Start: 2024-01-24

## 2023-07-26 RX ORDER — HEPARIN 100 UNIT/ML
500 SYRINGE INTRAVENOUS PRN
OUTPATIENT
Start: 2024-01-24

## 2023-07-26 RX ORDER — SODIUM CHLORIDE 0.9 % (FLUSH) 0.9 %
5-40 SYRINGE (ML) INJECTION PRN
OUTPATIENT
Start: 2024-01-24

## 2023-07-26 RX ORDER — SODIUM CHLORIDE 9 MG/ML
5-250 INJECTION, SOLUTION INTRAVENOUS PRN
Status: DISCONTINUED | OUTPATIENT
Start: 2023-07-26 | End: 2023-07-27 | Stop reason: HOSPADM

## 2023-07-26 RX ORDER — SODIUM CHLORIDE 9 MG/ML
INJECTION, SOLUTION INTRAVENOUS CONTINUOUS
OUTPATIENT
Start: 2024-01-24

## 2023-07-26 RX ORDER — ZOLEDRONIC ACID 0.04 MG/ML
4 INJECTION, SOLUTION INTRAVENOUS ONCE
Status: COMPLETED | OUTPATIENT
Start: 2023-07-26 | End: 2023-07-26

## 2023-07-26 RX ORDER — ACETAMINOPHEN 325 MG/1
650 TABLET ORAL
OUTPATIENT
Start: 2024-01-24

## 2023-07-26 RX ORDER — ZOLEDRONIC ACID 0.04 MG/ML
4 INJECTION, SOLUTION INTRAVENOUS ONCE
OUTPATIENT
Start: 2024-01-24 | End: 2024-01-24

## 2023-07-26 RX ORDER — ONDANSETRON 2 MG/ML
8 INJECTION INTRAMUSCULAR; INTRAVENOUS
OUTPATIENT
Start: 2024-01-24

## 2023-07-26 RX ORDER — OXYBUTYNIN CHLORIDE 10 MG/1
10 TABLET, EXTENDED RELEASE ORAL DAILY
Qty: 30 TABLET | Refills: 3 | Status: SHIPPED | OUTPATIENT
Start: 2023-07-26

## 2023-07-26 RX ORDER — ALBUTEROL SULFATE 90 UG/1
4 AEROSOL, METERED RESPIRATORY (INHALATION) PRN
OUTPATIENT
Start: 2024-01-24

## 2023-07-26 RX ADMIN — SODIUM CHLORIDE 25 ML/HR: 9 INJECTION, SOLUTION INTRAVENOUS at 09:51

## 2023-07-26 RX ADMIN — ZOLEDRONIC ACID 4 MG: 0.04 INJECTION, SOLUTION INTRAVENOUS at 09:54

## 2023-07-26 ASSESSMENT — PAIN SCALES - GENERAL: PAINLEVEL_OUTOF10: 0

## 2023-07-26 NOTE — PROGRESS NOTES
Spiritual Care Partner Volunteer visited patient at [facility] in [id number] [place] [address] on 7/26/2023    Documented by:  Paul Ruiz, [address] [facility], [address] [facility]  Staff [address] [facility]  Paging service: [phone] (LICO)

## 2023-07-26 NOTE — PROGRESS NOTES
Patient in today for follow-up visit with Dr. Lupe Pagan. Today is week 104 on study . QOLs completed per protocol. Next appt is expected in 1 year.

## 2023-07-26 NOTE — PROGRESS NOTES
[place] Shepherd is a 58 y.o. female here today to follow up for breast cancer     1. Have you been to the ER, urgent care clinic since your last visit? Hospitalized since your last visit?no    2. Have you seen or consulted any other health care providers outside of the [address] [place] [address] Avenue since your last visit? Include any pap smears or colon screening.  no
Sita Moore et al, [facility] 2013, for the meta-analysis for adjuvant bisphosphonate use in breast cancer. We discussed that in postmenopausal women, it appears that the bisphosphate  zolendronic acid, given as in ABCSG 12 at 4 mg IV q 6 months x 3 years, is beneficial in improving bone DFS and OS. We discussed side effects such as ONJ and the need to avoid invasive dental procedures while on these medications, renal damage, and hypocalcemia. She is agreeable to this. #1 8/24/22, #2 2/8/23, #3 due today 7/26/23, then due 1/2024 and will have this set up in [place]. Due for repeat DEXA now - she will schedule this in [place]. 8. Joint Pain: most likely due to AI and bothersome. Recommended tart cherry juice and omgea 3 FA 3g/day, she did not take. Improved on exemestane but now worsened to bilateral hands making work  difficult. Switched to letrozole - pain still present but tolerable. 9. Vertigo/ringing in ears: this has been present for many years but worsened. She saw ENT and they recommend hearing aides. 10. Hot flashes:  due to letrozole; will try oxybutynin 10 mg XL    I personally saw and evaluated the patient and performed the entire medical decision making. The history, physical exam, and documentation were performed by Lino Pineda NP. I reviewed and verified the above documentation and modified it as needed. Right and left breast cancer,  ER +, on letrozole, having joint pain, fatigue, hot flashes. Rx oxybutynin for hot flashes today. Mammogram normal in may. Moving to [place] soon. On zometa today for osteopenia, due for dexa, will get in [place]          Signed By:  Leland Gray MD         No orders of the defined types were placed in this encounter.

## 2024-08-19 ENCOUNTER — RESEARCH ENCOUNTER (OUTPATIENT)
Age: 64
End: 2024-08-19

## 2024-08-19 NOTE — PROGRESS NOTES
Patient is due for week 156 of study . This is the final set of surveys. Contacted patient by phone on 8/15/24 at 12pm. Patient stated she was at work and asked to call her back after 2:15pm. Attempted to contact patient again at 2:20pm, no answer, left VM. 2nd attempt at 2:40pm, no answer, left voicemail. Called patient again on Friday after 2 pm. No answer, sent directly to voicemail. Left message. Sent email on 8/19/24 at 9am. Will wait for patient reply.

## 2024-09-03 ENCOUNTER — RESEARCH ENCOUNTER (OUTPATIENT)
Age: 64
End: 2024-09-03

## (undated) DEVICE — ROCKER SWITCH PENCIL BLADE ELECTRODE, HOLSTER: Brand: EDGE

## (undated) DEVICE — INSULATED BLADE ELECTRODE: Brand: EDGE

## (undated) DEVICE — DERMABOND SKIN ADH 0.7ML -- DERMABOND ADVANCED 12/BX

## (undated) DEVICE — SUTURE VCRL SZ 3-0 L27IN ABSRB UD L26MM SH 1/2 CIR J416H

## (undated) DEVICE — DEVICE TRNSF SPIK STL 2008S] MICROTEK MEDICAL INC]

## (undated) DEVICE — STERILE POLYISOPRENE POWDER-FREE SURGICAL GLOVES: Brand: PROTEXIS

## (undated) DEVICE — SUTURE MCRYL SZ 4-0 L27IN ABSRB UD L19MM PS-2 1/2 CIR PRIM Y426H

## (undated) DEVICE — SYR 20ML LL STRL LF --

## (undated) DEVICE — CHEST PACK: Brand: MEDLINE INDUSTRIES, INC.

## (undated) DEVICE — CURVED, SMALL JAW, OPEN SEALER/DIVIDER: Brand: LIGASURE

## (undated) DEVICE — COVER US PRB W12XL244CM FLD IORT STR TIP

## (undated) DEVICE — SYR 10ML LUER LOK 1/5ML GRAD --

## (undated) DEVICE — GLOVE ORANGE PI 7 1/2   MSG9075

## (undated) DEVICE — SOL INJ SOD CL 0.9% 500ML BG --

## (undated) DEVICE — SOL IRRIGATION INJ NACL 0.9% 500ML BTL

## (undated) DEVICE — COVER LT HNDL PLAS RIG 1 PER PK

## (undated) DEVICE — NEEDLE HYPO 22GA L1.5IN BLK S STL HUB POLYPR SHLD REG BVL

## (undated) DEVICE — PENCIL SMK EVAC L10FT DIA95MM TBNG NONSTICK W ADPT TO 22MM

## (undated) DEVICE — DECANTER BAG 9": Brand: MEDLINE INDUSTRIES, INC.

## (undated) DEVICE — HYPODERMIC SAFETY NEEDLE: Brand: MONOJECT

## (undated) DEVICE — C-ARM: Brand: UNBRANDED

## (undated) DEVICE — CHEST PACK-SFMCASU: Brand: MEDLINE INDUSTRIES, INC.

## (undated) DEVICE — CANISTER, RIGID, 3000CC: Brand: MEDLINE INDUSTRIES, INC.